# Patient Record
Sex: FEMALE | Race: WHITE | Employment: FULL TIME | URBAN - METROPOLITAN AREA
[De-identification: names, ages, dates, MRNs, and addresses within clinical notes are randomized per-mention and may not be internally consistent; named-entity substitution may affect disease eponyms.]

---

## 2017-03-13 DIAGNOSIS — I10 ESSENTIAL HYPERTENSION WITH GOAL BLOOD PRESSURE LESS THAN 140/90: ICD-10-CM

## 2017-03-13 RX ORDER — LISINOPRIL 10 MG/1
TABLET ORAL
Qty: 30 TABLET | Refills: 0 | Status: SHIPPED | OUTPATIENT
Start: 2017-03-13 | End: 2017-03-21 | Stop reason: SINTOL

## 2017-03-13 NOTE — TELEPHONE ENCOUNTER
lisinopril (PRINIVIL,ZESTRIL) 10 MG tablet      Last Written Prescription Date: 10-24-16  Last Fill Quantity: 90, # refills: 1  Last Office Visit with [facility], [facility] or [facility] prescribing provider: 11-29-16  Next 5 appointments (look out 90 days)     Mar 21, 2017  8:40 AM CDT   MyChart Long with [facility] Leong [facility]   [facility]    [address] [place] [id number] [phone]                   Potassium   Date Value Ref Range Status   11/07/2016 4.1 3.4 - 5.3 mmol/L Final     Creatinine   Date Value Ref Range Status   11/07/2016 1.11 (H) 0.52 - 1.04 mg/dL Final     BP Readings from Last 3 Encounters:   11/07/16 130/76   10/24/16 135/73   09/27/16 (!) 161/101

## 2017-03-13 NOTE — TELEPHONE ENCOUNTER
Medication is being filled for 1 time refill only due to:  upcomming appointment     Lorene Orozco RN   March 13, 2017 4:52 PM  [facility] Triage   [phone]

## 2017-03-21 ENCOUNTER — RADIANT APPOINTMENT (OUTPATIENT)
Dept: GENERAL RADIOLOGY | Facility: CLINIC | Age: 47
End: 2017-03-21
Attending: FAMILY MEDICINE
Payer: COMMERCIAL

## 2017-03-21 ENCOUNTER — OFFICE VISIT (OUTPATIENT)
Dept: FAMILY MEDICINE | Facility: CLINIC | Age: 47
End: 2017-03-21
Payer: COMMERCIAL

## 2017-03-21 VITALS
SYSTOLIC BLOOD PRESSURE: 126 MMHG | HEIGHT: 66 IN | DIASTOLIC BLOOD PRESSURE: 74 MMHG | BODY MASS INDEX: 25.55 KG/M2 | OXYGEN SATURATION: 100 % | TEMPERATURE: 98.4 F | HEART RATE: 82 BPM | WEIGHT: 159 LBS

## 2017-03-21 DIAGNOSIS — T50.Z95D VACCINE REACTION, SUBSEQUENT ENCOUNTER: ICD-10-CM

## 2017-03-21 DIAGNOSIS — R05.9 COUGH: Primary | ICD-10-CM

## 2017-03-21 DIAGNOSIS — G43.109 MIGRAINE WITH AURA AND WITHOUT STATUS MIGRAINOSUS, NOT INTRACTABLE: ICD-10-CM

## 2017-03-21 DIAGNOSIS — R05.9 COUGH: ICD-10-CM

## 2017-03-21 DIAGNOSIS — T75.3XXA MOTION SICKNESS, INITIAL ENCOUNTER: ICD-10-CM

## 2017-03-21 DIAGNOSIS — Z79.899 ENCOUNTER FOR LONG-TERM (CURRENT) USE OF MEDICATIONS: ICD-10-CM

## 2017-03-21 DIAGNOSIS — Z12.4 SCREENING FOR MALIGNANT NEOPLASM OF CERVIX: ICD-10-CM

## 2017-03-21 DIAGNOSIS — I10 HYPERTENSION GOAL BP (BLOOD PRESSURE) < 140/90: ICD-10-CM

## 2017-03-21 DIAGNOSIS — F40.01 FEAR OF TRAVEL WITH PANIC ATTACKS: ICD-10-CM

## 2017-03-21 LAB
ANION GAP SERPL CALCULATED.3IONS-SCNC: 6 MMOL/L (ref 3–14)
BUN SERPL-MCNC: 15 MG/DL (ref 7–30)
CALCIUM SERPL-MCNC: 8.7 MG/DL (ref 8.5–10.1)
CHLORIDE SERPL-SCNC: 107 MMOL/L (ref 94–109)
CO2 SERPL-SCNC: 25 MMOL/L (ref 20–32)
CREAT SERPL-MCNC: 0.89 MG/DL (ref 0.52–1.04)
GFR SERPL CREATININE-BSD FRML MDRD: 68 ML/MIN/1.7M2
GLUCOSE SERPL-MCNC: 94 MG/DL (ref 70–99)
POTASSIUM SERPL-SCNC: 4.9 MMOL/L (ref 3.4–5.3)
SODIUM SERPL-SCNC: 138 MMOL/L (ref 133–144)

## 2017-03-21 PROCEDURE — 71020 XR CHEST 2 VW: CPT

## 2017-03-21 PROCEDURE — 80048 BASIC METABOLIC PNL TOTAL CA: CPT | Performed by: FAMILY MEDICINE

## 2017-03-21 PROCEDURE — 99214 OFFICE O/P EST MOD 30 MIN: CPT | Performed by: FAMILY MEDICINE

## 2017-03-21 PROCEDURE — 36415 COLL VENOUS BLD VENIPUNCTURE: CPT | Performed by: FAMILY MEDICINE

## 2017-03-21 RX ORDER — BENZONATATE 100 MG/1
100 CAPSULE ORAL 3 TIMES DAILY PRN
Qty: 42 CAPSULE | Refills: 0 | Status: SHIPPED | OUTPATIENT
Start: 2017-03-21 | End: 2017-10-20

## 2017-03-21 RX ORDER — LORAZEPAM 0.5 MG/1
0.25-0.5 TABLET ORAL EVERY 8 HOURS PRN
Qty: 20 TABLET | Refills: 1 | Status: SHIPPED | OUTPATIENT
Start: 2017-03-21 | End: 2017-09-29

## 2017-03-21 RX ORDER — SCOLOPAMINE TRANSDERMAL SYSTEM 1 MG/1
PATCH, EXTENDED RELEASE TRANSDERMAL
Qty: 6 PATCH | Refills: 2 | Status: SHIPPED
Start: 2017-03-21 | End: 2017-09-29

## 2017-03-21 RX ORDER — LOSARTAN POTASSIUM 25 MG/1
25 TABLET ORAL DAILY
Qty: 90 TABLET | Refills: 1 | Status: SHIPPED | OUTPATIENT
Start: 2017-03-21 | End: 2017-09-13

## 2017-03-21 RX ORDER — RIZATRIPTAN BENZOATE 5 MG/1
5-10 TABLET ORAL
Qty: 18 TABLET | Refills: 3 | Status: SHIPPED | OUTPATIENT
Start: 2017-03-21 | End: 2017-10-20

## 2017-03-21 NOTE — MR AVS SNAPSHOT
After Visit Summary   3/21/2017    Margie Becker    MRN: [id number]           Patient Information     Date Of Birth          1970        Visit Information        Provider Department      3/21/2017 8:40 AM Senait Leong MD [facility]        Today's Diagnoses     Cough    -  1    Hypertension goal BP (blood pressure) < 140/90        Fear of travel with panic attacks        Migraine with aura and without status migrainosus, not intractable        Motion sickness, initial encounter        Screening for malignant neoplasm of cervix        Encounter for long-term (current) use of medications        Vaccine reaction, subsequent encounter          Care Instructions    Please stop your lisinopril and let's switch to losartan.  I would like to have your blood pressure rechecked in 2-3 weeks - either with a nurse visit or at our pharmacy.    I would anticipate that it could take 2-4 weeks for your cough to resolve if it is related to your lisinopril.        Follow-ups after your visit        Additional Services     ALLERGY/ASTHMA ADULT REFERRAL       Your provider has referred you to: [facility]: [facility] [phone]  [email or web address]    Please be aware that coverage of these services is subject to the terms and limitations of your health insurance plan.  Call member services at your health plan with any benefit or coverage questions.      Please bring the following with you to your appointment:    (1) Any X-Rays, CTs or MRIs which have been performed.  Contact the facility where they were done to arrange for  prior to your scheduled appointment.    (2) List of current medications  (3) This referral request   (4) Any documents/labs given to you for this referral                  Who to contact     If you have questions or need follow up information about today's clinic visit or your schedule please contact [facility] directly at [phone].  Normal or non-critical lab and imaging results will be communicated to you by MyChart, letter or phone within 4 business days after the clinic has received the results. If you do not hear from us within 7 days, please contact the clinic through PVPowerhart or phone. If you have a critical or abnormal lab result, we will notify you by phone as soon as possible.  Submit refill requests through Hundo or call your pharmacy and they will forward the refill request to us. Please allow 3 business days for your refill to be completed.          Additional Information About Your Visit        PVPowerharCopybar Information     Hundo gives you secure access to your electronic health record. If you see a primary care provider, you can also send messages to your care team and make appointments. If you have questions, please call your primary care clinic.  If you do not have a primary care provider, please call [phone] and they will assist you.        Care EveryWhere ID     This is your Care EveryWhere ID. This could be used by other organizations to access your [place] medical records  [id number]        Your Vitals Were     Pulse Temperature Height Pulse Oximetry BMI (Body Mass Index)       82 98.4  F (36.9  C) (Oral) 5' 6\" (1.676 m) 100% 25.66 kg/m2        Blood Pressure from Last 3 Encounters:   03/21/17 126/74   11/07/16 130/76   10/24/16 135/73    Weight from Last 3 Encounters:   03/21/17 159 lb (72.1 kg)   11/07/16 166 lb (75.3 kg)   10/24/16 164 lb (74.4 kg)              We Performed the Following     ALLERGY/ASTHMA ADULT REFERRAL     Basic metabolic panel          Today's Medication Changes          These changes are accurate as of: 3/21/17 10:10 AM.  If you have any questions, ask your nurse or doctor.               Start taking these medicines.        Dose/Directions    benzonatate 100 MG capsule   Commonly known as:  TESSALON   Used for:  Cough   Started by:  Salo " Senait Marcano MD        Dose:  100 mg   Take 1 capsule (100 mg) by mouth 3 times daily as needed for cough   Quantity:  42 capsule   Refills:  0       losartan 25 MG tablet   Commonly known as:  COZAAR   Used for:  Hypertension goal BP (blood pressure) < 140/90   Started by:  Senait Leong MD        Dose:  25 mg   Take 1 tablet (25 mg) by mouth daily   Quantity:  90 tablet   Refills:  1         Stop taking these medicines if you haven't already. Please contact your care team if you have questions.     lisinopril 10 MG tablet   Commonly known as:  PRINIVIL/ZESTRIL   Stopped by:  Senait Leong MD                Where to get your medicines      These medications were sent to [facility] [address]  [address] [facility] [address]     Phone:  [phone]     benzonatate 100 MG capsule    losartan 25 MG tablet    rizatriptan 5 MG tablet    scopolamine 72 hr patch         Some of these will need a paper prescription and others can be bought over the counter.  Ask your nurse if you have questions.     Bring a paper prescription for each of these medications     LORazepam 0.5 MG tablet                Primary Care Provider Office Phone # Fax #    RICHARD Baker [facility] [phone]       [facility] [address] [place] [facility] [address]        Thank you!     Thank you for choosing [facility]  for your care. Our goal is always to provide you with excellent care. Hearing back from our patients is one way we can continue to improve our services. Please take a few minutes to complete the written survey that you may receive in the mail after your visit with us. Thank you!             Your Updated Medication List - Protect others around you: Learn how to safely use, store and throw away your medicines at www.disposemymeds.org.          This list is accurate as of: 3/21/17 10:10 AM.   Always use your most recent med list.                   Brand Name Dispense Instructions for use    benzonatate 100 MG capsule    TESSALON    42 capsule    Take 1 capsule (100 mg) by mouth 3 times daily as needed for cough       CENTRUM Tabs      1 TABLET DAILY       cetirizine 10 MG tablet    zyrTEC     Take 10 mg by mouth daily.       FISH OIL PO      Take 1 tablet by mouth daily.       folic acid 800 MCG Tabs      Take  by mouth.       ibuprofen 400 MG tablet    ADVIL/MOTRIN    60 tablet    Take 1-2 tablets by mouth every 6 hours as needed (cramping).       IRON SUPPLEMENT PO      Take  by mouth.       LORazepam 0.5 MG tablet    ATIVAN    20 tablet    Take 0.5-1 tablets (0.25-0.5 mg) by mouth every 8 hours as needed for anxiety Take 30 minutes prior to departure.  Do not operate a vehicle after taking this medication       losartan 25 MG tablet    COZAAR    90 tablet    Take 1 tablet (25 mg) by mouth daily       rizatriptan 5 MG tablet    MAXALT    18 tablet    Take 1-2 tablets (5-10 mg) by mouth at onset of headache for migraine May repeat dose in 2 hours.  Do not exceed 30 mg in 24 hours       scopolamine 72 hr patch    TRANSDERM    6 patch    Place 1 patch onto the skin every 72 hours.  Apply to hairless area behind one ear at least 4 hours before travel.  Remove old patch and change every 3 days.       vitamin D 1000 UNITS capsule      Take 1 capsule by mouth daily.

## 2017-03-21 NOTE — NURSING NOTE
Written Rx of Lorazepam given to patient in clinic during check out process.    Anisa Manjarrez MA

## 2017-03-21 NOTE — PATIENT INSTRUCTIONS
Please stop your lisinopril and let's switch to losartan.  I would like to have your blood pressure rechecked in 2-3 weeks - either with a nurse visit or at our pharmacy.    I would anticipate that it could take 2-4 weeks for your cough to resolve if it is related to your lisinopril.    [facility]   Discharged by : Anisa GONSALES MA    If you have any questions regarding your visit please contact your care team:     [facility] Clinic Hours Telephone Number   Dr. Bety Sprague, BBAAR   7am-7pm Monday - Thursday   7am-5pm Fridays  [phone]   (Appointment scheduling available 24/7)   RN Line   [phone] option 2       For a Price Quote for your services, please call our Consumer Price Line at [phone].     What options do I have for visits at the clinic other than the traditional office visit?     To expand how we care for you, many of our providers are utilizing electronic visits (e-visits) and telephone visits, when medically appropriate, for interactions with their patients rather than a visit in the clinic. We also offer nurse visits for many medical concerns. Just like any other service, we will bill your insurance company for this type of visit based on time spent on the phone with your provider. Not all insurance companies cover these visits. Please check with your medical insurance if this type of visit is covered. You will be responsible for any charges that are not paid by your insurance.   E-visits via AwarenessHub: generally incur a $35.00 fee.     Telephone visits:   Time spent on the phone: *charged based on time that is spent on the phone in increments of 10 minutes. Estimated cost:   5-10 mins $30.00   11-20 mins. $59.00   21-30 mins. $85.00     Use RedHelpert (secure email communication and access to your chart) to send your primary care provider a message or make an appointment. Ask someone on your Team how to sign up for AwarenessHub.      As always, Thank you for trusting us with your health care needs!      [facility]:    Scheduling Appointments  [facility]  Call: [phone]    [facility]  Call: [phone]    [facility]  Call: [phone]      WHERE TO GO FOR CARE?    Clinic    Make an appointment if you:       Are sick (cold, cough, flu, sore throat, earache or in pain).       Have a small injury (sprain, small cut, burn or broken bone).       Need a physical exam, Pap smear, vaccine or prescription refill.       Have questions about your health or medicines.    To reach us:      Call [phone] ([phone]). Open 24 hours every day. (For counseling services, call [phone].)    Log into [employer] at [employer]. (Visit [email or web address] to create an account.) Hospital emergency room    An emergency is a serious or life- threatening problem that must be treated right away.    Call 196 or get to the hospital if you have:      Very bad or sudden:            - Chest pain or pressure         - Bleeding         - Head or belly pain         - Dizziness or trouble seeing, walking or                          Speaking      Problems breathing      Blood in your vomit or you are coughing up blood      A major injury (knocked out, loss of a finger or limb, rape, broken bone protruding from skin)    A mental health crisis. (Or call the Mental Health Crisis line at 1-383.262.4950 or Suicide Prevention Hotline at 1-170.914.7277.)    Open 24 hours every day. You don't need an appointment.     Urgent care    Visit urgent care for sickness or small injuries when the clinic is closed. You don't need an appointment. To check hours or find an urgent care near you, visit [email or web address]. Online care    Get online care from [facility] for more than 70 common problems, like colds, allergies and infections. Open 24 hours every day at:  [email or web address]   Need help deciding?    For advice about where to be seen, you may call your clinic and ask to speak with a nurse. We're here for you 24 hours every day.         If you are deaf or hard of hearing, please let us know. We provide many free services including sign language interpreters, oral interpreters, TTYs, telephone amplifiers, note takers and written materials.

## 2017-03-21 NOTE — PROGRESS NOTES
SUBJECTIVE:                                                    Margie Becker is a 46 year old female who presents to clinic today for the following health issues:    *Patient is requesting recheck of creatinine levels. Last checked 11/7/16. 1.11mg/dl.    Migraine Follow-Up    Headaches symptoms:  stable    Frequency: annually, more seasonal with the weather changes, start around May typically    Duration of headaches: has not been bothered, will start soon    Able to do normal daily activities/work with migraines: Yes    Rescue/Relief medication:Maxalt              Effectiveness: total relief    Preventative medication: None    Neurologic complications: No new stroke-like symptoms, loss of vision or speech, numbness or weakness    In the past 4 weeks, how often have you gone to Urgent Care or the emergency room because of your headaches?  0       Amount of exercise or physical activity: 4-5 days/week for an average of 30-45 minutes    Problems taking medications regularly: No    Medication side effects: none    Diet: regular (no restrictions), not a lot of soft cheese and dairy, has lactose free milk    Uses Maxalt for migraines, but has not had one since last Spring. Usually triggered by allergies to birch pollen in the spring.     ENT Symptoms             Symptoms: cc Present Absent Comment   Fever/Chills   x    Fatigue   x    Muscle Aches   x    Eye Irritation   x    Sneezing   x    Nasal Ventura/Drg   x    Sinus Pressure/Pain   x    Loss of smell   x    Dental pain   x    Sore Throat   x    Swollen Glands   x    Ear Pain/Fullness   x    Cough x   dry   Wheeze   x    Chest Pain   x    Shortness of breath   x    Rash   x    Other   x      Symptom duration:  4 months, on and off cold symptoms during the 4 months, currently no other cold symptoms but dry cough   Symptom severity:  moderate   Treatments tried:           Has had a dry cough since late Fall 2016. Initially she caught a cold and saw Dr. Preston  "10/24 who suspected her cough was from postnasal drip but also noted it could be a side effect from lisinopril. Patient has been on lisinopril since 2016. She has caught a couple other colds since then and the cough has not gone away. It did resolve completely for about a month during which she was taking Percocet after her abdominoplasty. When she stopped the Percocet her cough slowly returned and is still present now. She says \"it feels like there is a little hair\" in the right side of her throat, but she hasn't seen anything in her throat when looking. It gets worse throughout the day and keeps her awake at night. No wheezes. No chest x-ray has been done.    Has a dust allergy as well. Takes zyrtec for allergies.    Reviewed negative chest x-ray and advised to discontinue lisinopril and start losartan. Will prescribe Tessalon pearls for cough management as well while on her trip. Will recheck BP before she leaves .     Travel  Goes to [place] for work about three time per year and requests Ativan for long flights. Her next flight is in 3 weeks, -. A 20 tablet supply lasts her about a year. Also uses scolpomine patch and would like refill.    Tetanus  Due for tetanus booster but says last time she had a fever for a couple days afterward. She discussed this with Kaity who mentioned following up with an allergist but patient forgot to do this. Would like referral to check for reactivity prior to booster.     Problem list and histories reviewed & adjusted, as indicated.  Additional history: as documented    Patient Active Problem List   Diagnosis     Acute reaction to stress     FAMILY HX PSYCHIATRIC COND anxiety, obsession     Atopic rhinitis     Fear of travel with panic attacks     CARDIOVASCULAR SCREENING; LDL GOAL LESS THAN 160     Dysmenorrhea     Excessive or frequent menstruation     Uterine leiomyoma     Infertility female      delivery delivered     Vitamin D deficiency     Hypertension goal " BP (blood pressure) < 140/90     Migraine with aura and without status migrainosus, not intractable     Past Surgical History   Procedure Laterality Date     Surgical history of -        rhinoplasty     Myomectomy uterus  2010     Rhinoplasty       Myomectomy uterus       Dilation and curettage, operative hysteroscopy, combined  2011     Procedure:COMBINED DILATION AND CURETTAGE, OPERATIVE HYSTEROSCOPY; Removal Endometrial Polyp; Surgeon:LOLA FUENTES; Location:[facility] OR      section  2013     Procedure:  SECTION;   Section;  Surgeon: Lola Fuentes MD;  Location: [facility] L+D     Hernia repair       Inguinal       Social History   Substance Use Topics     Smoking status: Never Smoker     Smokeless tobacco: Never Used     Alcohol use No     Family History   Problem Relation Age of Onset     Arthritis Mother      Thyroid Disease Mother      hypothyroidism     Depression Father      Alcohol/Drug Father      Neurologic Disorder Father      seizures     DIABETES Maternal Grandmother      HEART DISEASE Maternal Grandfather      HEART DISEASE Paternal Grandmother      Genitourinary Problems Paternal Grandfather      Depression Sister      Depression Sister      Hypertension Mother      Lipids Father      Breast Cancer Mother          Current Outpatient Prescriptions   Medication Sig Dispense Refill     LORazepam (ATIVAN) 0.5 MG tablet Take 0.5-1 tablets (0.25-0.5 mg) by mouth every 8 hours as needed for anxiety Take 30 minutes prior to departure.  Do not operate a vehicle after taking this medication 20 tablet 1     rizatriptan (MAXALT) 5 MG tablet Take 1-2 tablets (5-10 mg) by mouth at onset of headache for migraine May repeat dose in 2 hours.  Do not exceed 30 mg in 24 hours 18 tablet 3     scopolamine (TRANSDERM) 72 hr patch Place 1 patch onto the skin every 72 hours.  Apply to hairless area behind one ear at least 4 hours before travel.  Remove old patch and change  "every 3 days. 6 patch 2     losartan (COZAAR) 25 MG tablet Take 1 tablet (25 mg) by mouth daily 90 tablet 1     benzonatate (TESSALON) 100 MG capsule Take 1 capsule (100 mg) by mouth 3 times daily as needed for cough 42 capsule 0     cetirizine (ZYRTEC) 10 MG tablet Take 10 mg by mouth daily.       ibuprofen (ADVIL,MOTRIN) 400 MG tablet Take 1-2 tablets by mouth every 6 hours as needed (cramping). 60 tablet 0     Ferrous Sulfate (IRON SUPPLEMENT PO) Take  by mouth.       Omega-3 Fatty Acids (FISH OIL PO) Take 1 tablet by mouth daily.       Cholecalciferol (VITAMIN D) 1000 UNIT capsule Take 1 capsule by mouth daily.       folic acid 800 MCG TABS Take  by mouth.       CENTRUM OR TABS 1 TABLET DAILY       Allergies   Allergen Reactions     Iodine Hives     xray dye     Penicillins Hives     Tetanus Toxoid Other (See Comments)     Fingers started to curl and get stiff. And did have a fever of  104.  Lasted for   1 1/2 days.  And the reaction started about 6 hours after the injection.       Hydrochlorothiazide Rash     Lexapro [Escitalopram Oxalate] Rash     Sulfa Drugs Rash       Reviewed and updated as needed this visit by clinical staff  Allergies  Meds  Problems       Reviewed and updated as needed this visit by Provider  Allergies  Meds  Problems         ROS:  Constitutional, HEENT, cardiovascular, pulmonary, gi and gu systems are negative, except as otherwise noted.    This document serves as a record of the services and decisions personally performed by Senait Leong MD. It was created on his/her behalf by Sandra Harley, a trained medical scribe. The creation of this document is based on the provider's statements to the medical scribe. Sandra Harley March 21, 2017 9:01 AM  OBJECTIVE:                                                    /74 (BP Location: Right arm, Patient Position: Chair, Cuff Size: Adult Large)  Pulse 82  Temp 98.4  F (36.9  C) (Oral)  Ht 5' 6\" (1.676 m)  Wt 159 lb (72.1 kg)  " SpO2 100%  BMI 25.66 kg/m2  Body mass index is 25.66 kg/(m^2).  GENERAL: healthy, alert and no distress  HENT: ear canals and TM's normal, nose and mouth without ulcers or lesions  RESP: lungs clear to auscultation - no rales, rhonchi or wheezes  CV: regular rate and rhythm, normal S1 S2, no S3 or S4, no murmur, click or rub, no peripheral edema and peripheral pulses strong    Diagnostic Test Results:  No results found for this or any previous visit (from the past 24 hour(s)).  CXR - negative     ASSESSMENT/PLAN:                                                    (R05) Cough  (primary encounter diagnosis)  Comment: CXR today was negative. Suspect this is a side effect of lisinopril, although could also be asthma  Plan: XR Chest 2 Views, benzonatate (TESSALON) 100 MG        capsule        Discontinue lisinopril and start losartan. Follow up in 2-4 weeks for nurse visit to recheck BP.   If cough not improved, would consider trial of advair.  If that doesn't help, and she continues to have throat symptoms would consider referral to ENT.    (I10) Hypertension goal BP (blood pressure) < 140/90  Comment: Controlled but experiencing cough as noted above.  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    (G43.109) Migraine with aura and without status migrainosus, not intractable  Comment: Triggered by allergies. Controlled with Maxalt. Has not had migraine since last spring.   Plan: rizatriptan (MAXALT) 5 MG tablet        Continue current medications.    (T75.3XXA) Motion sickness, initial encounter  Comment: related to flying and boats, buses  Plan: scopolamine (TRANSDERM) 72 hr patch        Refills provided    (Z12.4) Screening for malignant neoplasm of cervix  Comment: Due  Plan: Will schedule    (Z79.899) Encounter for long-term (current) use of  medications  Comment: Requests creatinine recheck (lat done 11/2016)  Plan: Basic metabolic panel        Adjust therapy based on labs    (T50.Z95D) Vaccine reaction, subsequent encounter  Comment: Had fever after last tetanus booster, forgot to f/u with allergist as previously suggested.  Plan: ALLERGY/ASTHMA ADULT REFERRAL        F/u with allergist      Patient Instructions     Please stop your lisinopril and let's switch to losartan.  I would like to have your blood pressure rechecked in 2-3 weeks - either with a nurse visit or at our pharmacy.    I would anticipate that it could take 2-4 weeks for your cough to resolve if it is related to your lisinopril.    [facility]   Discharged by : Anisa GONSALES MA    If you have any questions regarding your visit please contact your care team:     [facility] Clinic Hours Telephone Number   Dr. Bety Sprague, BABAR   7am-7pm Monday - Thursday   7am-5pm Fridays  [phone]   (Appointment scheduling available 24/7)   RN Line   [phone] option 2       For a Price Quote for your services, please call our Consumer Price Line at [phone].     What options do I have for visits at the clinic other than the traditional office visit?     To expand how we care for you, many of our providers are utilizing electronic visits (e-visits) and telephone visits, when medically appropriate, for interactions with their patients rather than a visit in the clinic. We also offer nurse visits for many medical concerns. Just like any other service, we will bill your insurance company for this type of visit based on time spent on the phone with your provider. Not all insurance companies cover these visits. Please check with your medical insurance if this type of visit is covered. You will be responsible for any charges that are not paid by your insurance.   E-visits via [employer]: generally incur a $35.00 fee.     Telephone  visits:   Time spent on the phone: *charged based on time that is spent on the phone in increments of 10 minutes. Estimated cost:   5-10 mins $30.00   11-20 mins. $59.00   21-30 mins. $85.00     Use [employer] (secure email communication and access to your chart) to send your primary care provider a message or make an appointment. Ask someone on your Team how to sign up for [employer].     As always, Thank you for trusting us with your health care needs!      [facility]:    Scheduling Appointments  [place], [facility]  Call: [phone]    [place], [facility]  Call: [phone]    [facility]  Call: [phone]      WHERE TO GO FOR CARE?    Clinic    Make an appointment if you:       Are sick (cold, cough, flu, sore throat, earache or in pain).       Have a small injury (sprain, small cut, burn or broken bone).       Need a physical exam, Pap smear, vaccine or prescription refill.       Have questions about your health or medicines.    To reach us:      Call [phone] ([phone]). Open 24 hours every day. (For counseling services, call [phone].)    Log into [employer] at [email or web address]. (Visit [email or web address] to create an account.) Hospital emergency room    An emergency is a serious or life- threatening problem that must be treated right away.    Call 852 or get to the hospital if you have:      Very bad or sudden:            - Chest pain or pressure         - Bleeding         - Head or belly pain         - Dizziness or trouble seeing, walking or                          Speaking      Problems breathing      Blood in your vomit or you are coughing up blood      A major injury (knocked out, loss of a finger or limb, rape, broken bone protruding from skin)    A mental health crisis. (Or call the Mental Health Crisis line at 1-894.485.5262 or Suicide Prevention Hotline at 1-753.564.4666.)    Open 24 hours every day. You  don't need an appointment.     Urgent care    Visit urgent care for sickness or small injuries when the clinic is closed. You don't need an appointment. To check hours or find an urgent care near you, visit [email or web address]. Online care    Get online care from [facility] for more than 70 common problems, like colds, allergies and infections. Open 24 hours every day at: [email or web address]   Need help deciding?    For advice about where to be seen, you may call your clinic and ask to speak with a nurse. We're here for you 24 hours every day.         If you are deaf or hard of hearing, please let us know. We provide many free services including sign language interpreters, oral interpreters, TTYs, telephone amplifiers, note takers and written materials.                     The information in this document, created by the medical scribe Sandra Harley for me, accurately reflects the services I personally performed and the decisions made by me. I have reviewed and approved this document for accuracy prior to leaving the patient care area.    Senait Leong MD  [facility]

## 2017-07-01 ENCOUNTER — HEALTH MAINTENANCE LETTER (OUTPATIENT)
Age: 47
End: 2017-07-01

## 2017-09-13 DIAGNOSIS — I10 HYPERTENSION GOAL BP (BLOOD PRESSURE) < 140/90: ICD-10-CM

## 2017-09-13 NOTE — TELEPHONE ENCOUNTER
losartan (COZAAR) 25 MG tablet   25 mg, DAILY 1 ordered  Edit     Summary: Take 1 tablet (25 mg) by mouth daily, Disp-90 tablet, R-1, E-Prescribe   Dose, Route, Frequency: 25 mg, Oral, DAILY  Start: 3/21/2017  Ord/Sold: 3/21/2017 (O)  Report  Taking:   Long-term:   Pharmacy: [address]  Med Dose History       Patient Sig: Take 1 tablet (25 mg) by mouth daily       Ordered on: 3/21/2017       Authorized by: NYDIA LEY       Dispense: 90 tablet          Last Office Visit with [facility], [facility] or [facility] prescribing provider: 3-       Potassium   Date Value Ref Range Status   03/21/2017 4.9 3.4 - 5.3 mmol/L Final     Creatinine   Date Value Ref Range Status   03/21/2017 0.89 0.52 - 1.04 mg/dL Final     BP Readings from Last 3 Encounters:   03/21/17 126/74   11/07/16 130/76   10/24/16 135/73

## 2017-09-14 RX ORDER — LOSARTAN POTASSIUM 25 MG/1
25 TABLET ORAL DAILY
Qty: 30 TABLET | Refills: 0 | Status: SHIPPED | OUTPATIENT
Start: 2017-09-14 | End: 2017-10-15

## 2017-09-29 ENCOUNTER — OFFICE VISIT (OUTPATIENT)
Dept: FAMILY MEDICINE | Facility: CLINIC | Age: 47
End: 2017-09-29
Payer: COMMERCIAL

## 2017-09-29 VITALS
TEMPERATURE: 98.2 F | SYSTOLIC BLOOD PRESSURE: 126 MMHG | DIASTOLIC BLOOD PRESSURE: 74 MMHG | WEIGHT: 166 LBS | HEIGHT: 66 IN | HEART RATE: 80 BPM | BODY MASS INDEX: 26.68 KG/M2

## 2017-09-29 DIAGNOSIS — T50.A15S: ICD-10-CM

## 2017-09-29 DIAGNOSIS — Z23 NEED FOR PROPHYLACTIC VACCINATION AND INOCULATION AGAINST INFLUENZA: ICD-10-CM

## 2017-09-29 DIAGNOSIS — T75.3XXA MOTION SICKNESS, INITIAL ENCOUNTER: ICD-10-CM

## 2017-09-29 DIAGNOSIS — I10 HYPERTENSION GOAL BP (BLOOD PRESSURE) < 140/90: Primary | ICD-10-CM

## 2017-09-29 DIAGNOSIS — F40.01 FEAR OF TRAVEL WITH PANIC ATTACKS: ICD-10-CM

## 2017-09-29 PROCEDURE — 90471 IMMUNIZATION ADMIN: CPT | Performed by: FAMILY MEDICINE

## 2017-09-29 PROCEDURE — 99213 OFFICE O/P EST LOW 20 MIN: CPT | Mod: 25 | Performed by: FAMILY MEDICINE

## 2017-09-29 PROCEDURE — 90686 IIV4 VACC NO PRSV 0.5 ML IM: CPT | Performed by: FAMILY MEDICINE

## 2017-09-29 RX ORDER — SCOLOPAMINE TRANSDERMAL SYSTEM 1 MG/1
PATCH, EXTENDED RELEASE TRANSDERMAL
Qty: 6 PATCH | Refills: 2 | Status: SHIPPED | OUTPATIENT
Start: 2017-09-29 | End: 2018-01-16

## 2017-09-29 RX ORDER — LORAZEPAM 0.5 MG/1
0.25-0.5 TABLET ORAL EVERY 8 HOURS PRN
Qty: 20 TABLET | Refills: 1 | Status: SHIPPED | OUTPATIENT
Start: 2017-09-29 | End: 2018-04-02

## 2017-09-29 RX ORDER — ONDANSETRON 8 MG/1
8 TABLET, ORALLY DISINTEGRATING ORAL
Qty: 40 TABLET | Refills: 1 | Status: SHIPPED | OUTPATIENT
Start: 2017-09-29 | End: 2017-10-20

## 2017-09-29 RX ORDER — HYDROXYZINE PAMOATE 25 MG/1
25 CAPSULE ORAL 3 TIMES DAILY PRN
Qty: 20 CAPSULE | Refills: 1 | Status: SHIPPED | OUTPATIENT
Start: 2017-09-29 | End: 2018-05-17

## 2017-09-29 NOTE — PROGRESS NOTES
SUBJECTIVE:   Margie Becker is a 47 year old female who presents to clinic today for the following health issues:      Hypertension Follow-up      Outpatient blood pressures are being checked at home.  Results are 130's over 70's.  It has been up to 140/80on stressful days at work     Low Salt Diet: low salt    cozzar 25 mg daily         Amount of exercise or physical activity: 4-5days/week for an average of greater than 60 minutes.    Problems taking medications regularly: No    Medication side effects: none  Diet: low salt    She has not had a tetnus shot since 2003.  She remembers having a high fever of 104.  This lasted about 48 hours.  She was told to go to the allergist but has not yet.      She has taken ativan and scopolamine patch with flying.  She has vomited in the past on the planes.  She does have to travel for work as far as [place].  She feels out of it with the patches on.  This affects her work as she has occasional meetings right after the flight.  She was on zofran in the past for post operative nausea.  She tolerated this well.  She gets sleepy on dramamine.  It didn't really help either.  She is traveling in bursts with flying twice a day for a few days.  Then she will stay in a spot a few days then fly again.      Problem list and histories reviewed & adjusted, as indicated.  Additional history: as documented    BP Readings from Last 3 Encounters:   09/29/17 126/74   03/21/17 126/74   11/07/16 130/76    Wt Readings from Last 3 Encounters:   09/29/17 166 lb (75.3 kg)   03/21/17 159 lb (72.1 kg)   11/07/16 166 lb (75.3 kg)                      Reviewed and updated as needed this visit by clinical staffTobacco  Allergies  Meds  Med Hx  Surg Hx  Fam Hx  Soc Hx      Reviewed and updated as needed this visit by Provider         ROS:  Constitutional, HEENT, cardiovascular, pulmonary, GI, , musculoskeletal, neuro, skin, endocrine and psych systems are negative, except as otherwise  "noted.      OBJECTIVE:   /74 (BP Location: Right arm, Cuff Size: Adult Regular)  Pulse 80  Temp 98.2  F (36.8  C) (Oral)  Ht 5' 6\" (1.676 m)  Wt 166 lb (75.3 kg)  BMI 26.79 kg/m2  Body mass index is 26.79 kg/(m^2).  GENERAL: healthy, alert and no distress  HENT: normal cephalic/atraumatic, oropharynx clear and oral mucous membranes moist  NECK: no adenopathy, no asymmetry, masses, or scars and thyroid normal to palpation  RESP: lungs clear to auscultation - no rales, rhonchi or wheezes  CV: regular rate and rhythm, normal S1 S2, no S3 or S4, no murmur, click or rub, no peripheral edema and peripheral pulses strong  MS: no gross musculoskeletal defects noted, no edema  PSYCH: mentation appears normal, affect normal/bright    Diagnostic Test Results:  Results for orders placed or performed in visit on 03/21/17   XR Chest 2 Views    Narrative    XR CHEST 2 VW  3/21/2017 9:30 AM    HISTORY:  Cough    COMPARISON:  None.      Impression    IMPRESSION:  Negative.     TREMAYNE ORANTES MD       ASSESSMENT/PLAN:     Hypertension; controlled   Associated with the following complications:    None   Plan:  No changes in the patient's current treatment plan        ASSESSMENT/PLAN:      ICD-10-CM    1. Hypertension goal BP (blood pressure) < 140/90 I10    2. Fear of travel with panic attacks F40.01 LORazepam (ATIVAN) 0.5 MG tablet     hydrOXYzine (VISTARIL) 25 MG capsule   3. Motion sickness, initial encounter T75.3XXA scopolamine (TRANSDERM) 72 hr patch     ondansetron (ZOFRAN ODT) 8 MG ODT tab    related to flying and boats, buses   4. Adverse reaction to diphtheria, tetanus, and pertussis vaccine, sequela T50.A15S ALLERGY/ASTHMA ADULT REFERRAL   5. Need for prophylactic vaccination and inoculation against influenza Z23 FLU VAC, SPLIT VIRUS IM > 3 YO (QUADRIVALENT) [69495]     Vaccine Administration, Initial [60126]     I would like her to get off the ativan and try vistaril instead but will prescribe both.  She knows " not to use together.      Patient Instructions   Try to use vistaril instead of ativan for travel anxiety     Use zofran for motion sickness instead of the patch     See the allergist     Follow up to see me for fasting labs, physical and pap in a few weeks     Your Blood pressure goal is <135/85    Cheryl Augustin D.O.              FUTURE APPOINTMENTS:       - Follow-up visit in 6 months for HTN    Cheryl Augustin, DO  [facility]  Injectable Influenza Immunization Documentation    1.  Is the person to be vaccinated sick today?   No    2. Does the person to be vaccinated have an allergy to a component   of the vaccine?   No    3. Has the person to be vaccinated ever had a serious reaction   to influenza vaccine in the past?   No    4. Has the person to be vaccinated ever had Guillain-Barré syndrome?   No    Form completed by Kaity Macias CMA ([facility])

## 2017-09-29 NOTE — PATIENT INSTRUCTIONS
Try to use vistaril instead of ativan for travel anxiety     Use zofran for motion sickness instead of the patch     See the allergist     Follow up to see me for fasting labs, physical and pap in a few weeks     Your Blood pressure goal is <135/85    Cheryl MONTEZO.

## 2017-09-29 NOTE — NURSING NOTE
One TRANSDERM and one ATIVAN script given to patient.    Kaity Macias CMA ([facility])

## 2017-09-29 NOTE — NURSING NOTE
"Chief Complaint   Patient presents with     Hypertension     Flu Shot     DONE      Gyn Exam     Pap only      Refill Request     Medications pended      Medication Request     Ondansetron - pt states that the pastches make her feel very out of it after the flight.        Initial /74 (BP Location: Right arm, Cuff Size: Adult Regular)  Pulse 80  Temp 98.2  F (36.8  C) (Oral)  Ht 5' 6\" (1.676 m)  Wt 166 lb (75.3 kg)  BMI 26.79 kg/m2 Estimated body mass index is 26.79 kg/(m^2) as calculated from the following:    Height as of this encounter: 5' 6\" (1.676 m).    Weight as of this encounter: 166 lb (75.3 kg).  Medication Reconciliation: complete     Kaity Macias CMA ([facility])      "

## 2017-09-29 NOTE — MR AVS SNAPSHOT
After Visit Summary   9/29/2017    Margie Becker    MRN: [id number]           Patient Information     Date Of Birth          1970        Visit Information        Provider Department      9/29/2017 2:00 PM Cheryl Augustin DO [facility]        Today's Diagnoses     Need for prophylactic vaccination and inoculation against influenza    -  1    Fear of travel with panic attacks        Motion sickness, initial encounter        Hypertension goal BP (blood pressure) < 140/90        Adverse reaction to diphtheria, tetanus, and pertussis vaccine, sequela          Care Instructions    Try to use vistaril instead of ativan for travel anxiety     Use zofran for motion sickness instead of the patch     See the allergist     Follow up to see me for fasting labs, physical and pap in a few weeks     Your Blood pressure goal is <135/85    Cheryl Augustin D.O.            Follow-ups after your visit        Additional Services     ALLERGY/ASTHMA ADULT REFERRAL       Your provider has referred you to: [facility]: [facility] Charles Abdi [phone]  [email or web address]    Please be aware that coverage of these services is subject to the terms and limitations of your health insurance plan.  Call member services at your health plan with any benefit or coverage questions.      Please bring the following with you to your appointment:    (1) Any X-Rays, CTs or MRIs which have been performed.  Contact the facility where they were done to arrange for  prior to your scheduled appointment.    (2) List of current medications  (3) This referral request   (4) Any documents/labs given to you for this referral                  Who to contact     If you have questions or need follow up information about today's clinic visit or your schedule please contact [facility] directly at [phone].  Normal or non-critical lab and imaging results will be communicated  "to you by Neozonehart, letter or phone within 4 business days after the clinic has received the results. If you do not hear from us within 7 days, please contact the clinic through GSOUND or phone. If you have a critical or abnormal lab result, we will notify you by phone as soon as possible.  Submit refill requests through GSOUND or call your pharmacy and they will forward the refill request to us. Please allow 3 business days for your refill to be completed.          Additional Information About Your Visit        GSOUND Information     GSOUND gives you secure access to your electronic health record. If you see a primary care provider, you can also send messages to your care team and make appointments. If you have questions, please call your primary care clinic.  If you do not have a primary care provider, please call [phone] and they will assist you.        Care EveryWhere ID     This is your Care EveryWhere ID. This could be used by other organizations to access your [place] medical records  [id number]        Your Vitals Were     Pulse Temperature Height BMI (Body Mass Index)          80 98.2  F (36.8  C) (Oral) 5' 6\" (1.676 m) 26.79 kg/m2         Blood Pressure from Last 3 Encounters:   09/29/17 126/74   03/21/17 126/74   11/07/16 130/76    Weight from Last 3 Encounters:   09/29/17 166 lb (75.3 kg)   03/21/17 159 lb (72.1 kg)   11/07/16 166 lb (75.3 kg)              We Performed the Following     ALLERGY/ASTHMA ADULT REFERRAL     FLU VAC, SPLIT VIRUS IM > 3 YO (QUADRIVALENT) [86061]     Vaccine Administration, Initial [06309]          Today's Medication Changes          These changes are accurate as of: 9/29/17  2:42 PM.  If you have any questions, ask your nurse or doctor.               Start taking these medicines.        Dose/Directions    hydrOXYzine 25 MG capsule   Commonly known as:  VISTARIL   Used for:  Fear of travel with panic attacks   Started by:  Cheryl Augustin DO        Dose:  25 mg   Take 1 " capsule (25 mg) by mouth 3 times daily as needed for anxiety   Quantity:  20 capsule   Refills:  1       ondansetron 8 MG ODT tab   Commonly known as:  ZOFRAN ODT   Used for:  Motion sickness, initial encounter   Started by:  Cheryl Augustin DO        Dose:  8 mg   Take 1 tablet (8 mg) by mouth 3 times daily (before meals)   Quantity:  40 tablet   Refills:  1            Where to get your medicines      These medications [place] [facility] [place] [address]  [address] [place] [address]     Phone:  [phone]     hydrOXYzine 25 MG capsule    ondansetron 8 MG ODT tab         Some of these will need a paper prescription and others can be bought over the counter.  Ask your nurse if you have questions.     Bring a paper prescription for each of these medications     LORazepam 0.5 MG tablet    scopolamine 72 hr patch                Primary Care Provider Office Phone # Fax #    Ria Cuevas, RICHARD [facility] [phone] [address] [place] [address]        Equal Access to Services     [facility]: Hadii aad ku hadasho Sofab, waaxda luqadaha, qaybta kaalmada adeegyacontreras, giulia sharma. So [facility] [phone].    ATENCIÓN: Si habla español, tiene a guzman disposición servicios gratuitos de asistencia lingüística. [facility] [phone].    We comply with applicable federal civil rights laws and [place] laws. We do not discriminate on the basis of race, color, national origin, age, disability, sex, sexual orientation, or gender identity.            Thank you!     Thank you for choosing [facility]  for your care. Our goal is always to provide you with excellent care. Hearing back from our patients is one way we can continue to improve our services. Please take a few minutes to complete the written survey that you may receive in the mail after your visit with us. Thank you!             Your  Updated Medication List - Protect others around you: Learn how to safely use, store and throw away your medicines at www.disposemymeds.org.          This list is accurate as of: 17  2:42 PM.  Always use your most recent med list.                   Brand Name Dispense Instructions for use Diagnosis    benzonatate 100 MG capsule    TESSALON    42 capsule    Take 1 capsule (100 mg) by mouth 3 times daily as needed for cough    Cough       CENTRUM Tabs      1 TABLET DAILY        cetirizine 10 MG tablet    zyrTEC     Take 10 mg by mouth daily.        folic acid 800 MCG Tabs      Take  by mouth.        hydrOXYzine 25 MG capsule    VISTARIL    20 capsule    Take 1 capsule (25 mg) by mouth 3 times daily as needed for anxiety    Fear of travel with panic attacks       ibuprofen 400 MG tablet    ADVIL/MOTRIN    60 tablet    Take 1-2 tablets by mouth every 6 hours as needed (cramping).     delivery delivered       IRON SUPPLEMENT PO      Take  by mouth.        LORazepam 0.5 MG tablet    ATIVAN    20 tablet    Take 0.5-1 tablets (0.25-0.5 mg) by mouth every 8 hours as needed for anxiety Take 30 minutes prior to departure.  Do not operate a vehicle after taking this medication    Fear of travel with panic attacks       losartan 25 MG tablet    COZAAR    30 tablet    Take 1 tablet (25 mg) by mouth daily Due to follow up with our clinic RN for blood pressure management please!    Hypertension goal BP (blood pressure) < 140/90       ondansetron 8 MG ODT tab    ZOFRAN ODT    40 tablet    Take 1 tablet (8 mg) by mouth 3 times daily (before meals)    Motion sickness, initial encounter       rizatriptan 5 MG tablet    MAXALT    18 tablet    Take 1-2 tablets (5-10 mg) by mouth at onset of headache for migraine May repeat dose in 2 hours.  Do not exceed 30 mg in 24 hours    Migraine with aura and without status migrainosus, not intractable       scopolamine 72 hr patch    TRANSDERM    6 patch    Place 1 patch onto the skin  every 72 hours.  Apply to hairless area behind one ear at least 4 hours before travel.  Remove old patch and change every 3 days.    Motion sickness, initial encounter       vitamin D 1000 UNITS capsule      Take 1 capsule by mouth daily.

## 2017-10-04 ENCOUNTER — MYC MEDICAL ADVICE (OUTPATIENT)
Dept: FAMILY MEDICINE | Facility: CLINIC | Age: 47
End: 2017-10-04

## 2017-10-04 NOTE — LETTER
[facility]  [address] [place] [id number] [phone]                                                                                                October 10, 2017    Margie Becker  [address] [address]        Dear Ms. Becker,    In order to ensure we are providing the best quality care, we have reviewed your chart and see that you are due for:    1 Physical with Pap    Please call the clinic at your earliest convenience to schedule an appointment.    Thank you for trusting us with your health care.    Sincerely,    Dr Nataly Carrillo/jose

## 2017-10-04 NOTE — TELEPHONE ENCOUNTER
Panel Management Review      Composite cancer screening  Chart review shows that this patient is due/due soon for the following Pap Smear  Summary:    Patient is due/failing the following:   PAP and PHYSICAL    Action needed:   Patient needs office visit for Physical with Pap.    Type of outreach:    Sent [facility] message.    Questions for provider review:    None                                                                                                                                    Anisa Manjarrez MA       Chart routed to Care Team .

## 2017-10-15 DIAGNOSIS — I10 HYPERTENSION GOAL BP (BLOOD PRESSURE) < 140/90: ICD-10-CM

## 2017-10-16 NOTE — TELEPHONE ENCOUNTER
losartan (COZAAR) 25 MG tablet   25 mg, DAILY 0 ordered  Edit     Summary: Take 1 tablet (25 mg) by mouth daily Due to follow up with our clinic RN for blood pressure management please!, Disp-30 tablet, R-0, E-Prescribe   Dose, Route, Frequency: 25 mg, Oral, DAILY  Start: 9/14/2017  Ord/Sold: 9/14/2017 (O)  Report  Taking:   Long-term:   Pharmacy[place] [facility] [place] - [address] [place] [address].  Med Dose History       Patient Sig: Take 1 tablet (25 mg) by mouth daily Due to follow up with our clinic RN for blood pressure management please!       Ordered on: 9/14/2017       Authorized by: NYDIA LEY       Dispense: 30 tablet       Admin Instructions: Due to follow up with our clinic RN for blood pressure management please!       Prior Authorization: Request PA          Last Office Visit with G, P or [facility] prescribing provider: 9-  Next 5 appointments (look out 90 days)     Oct 20, 2017 12:40 PM CDT   [facility] with Cheryl Augustin DO   [facility]    [address] [id number] [phone]                   Potassium   Date Value Ref Range Status   03/21/2017 4.9 3.4 - 5.3 mmol/L Final     Creatinine   Date Value Ref Range Status   03/21/2017 0.89 0.52 - 1.04 mg/dL Final     BP Readings from Last 3 Encounters:   09/29/17 126/74   03/21/17 126/74   11/07/16 130/76

## 2017-10-17 ENCOUNTER — MYC REFILL (OUTPATIENT)
Dept: FAMILY MEDICINE | Facility: CLINIC | Age: 47
End: 2017-10-17

## 2017-10-17 DIAGNOSIS — I10 HYPERTENSION GOAL BP (BLOOD PRESSURE) < 140/90: ICD-10-CM

## 2017-10-17 RX ORDER — LOSARTAN POTASSIUM 25 MG/1
TABLET ORAL
Qty: 90 TABLET | Refills: 1 | Status: CANCELLED | OUTPATIENT
Start: 2017-10-17

## 2017-10-17 RX ORDER — LOSARTAN POTASSIUM 25 MG/1
TABLET ORAL
Qty: 90 TABLET | Refills: 1 | Status: SHIPPED | OUTPATIENT
Start: 2017-10-17 | End: 2018-03-25

## 2017-10-17 NOTE — TELEPHONE ENCOUNTER
Message from MyChart:  Original authorizing provider: Senait Leong MD    Margie Limapaloma would like a refill of the following medications:  losartan (COZAAR) 25 MG tablet [Senait Leong MD]    Preferred pharmacy: [facility] [address]    Comment:

## 2017-10-20 ENCOUNTER — OFFICE VISIT (OUTPATIENT)
Dept: FAMILY MEDICINE | Facility: CLINIC | Age: 47
End: 2017-10-20
Payer: COMMERCIAL

## 2017-10-20 VITALS
WEIGHT: 165.6 LBS | HEIGHT: 66 IN | BODY MASS INDEX: 26.61 KG/M2 | HEART RATE: 76 BPM | TEMPERATURE: 98.1 F | DIASTOLIC BLOOD PRESSURE: 84 MMHG | SYSTOLIC BLOOD PRESSURE: 128 MMHG

## 2017-10-20 DIAGNOSIS — Z80.3 FAMILY HISTORY OF MALIGNANT NEOPLASM OF BREAST: ICD-10-CM

## 2017-10-20 DIAGNOSIS — G43.109 MIGRAINE WITH AURA AND WITHOUT STATUS MIGRAINOSUS, NOT INTRACTABLE: ICD-10-CM

## 2017-10-20 DIAGNOSIS — Z00.00 ROUTINE GENERAL MEDICAL EXAMINATION AT A HEALTH CARE FACILITY: Primary | ICD-10-CM

## 2017-10-20 DIAGNOSIS — I10 HYPERTENSION GOAL BP (BLOOD PRESSURE) < 140/90: ICD-10-CM

## 2017-10-20 DIAGNOSIS — Z12.4 SCREENING FOR MALIGNANT NEOPLASM OF CERVIX: ICD-10-CM

## 2017-10-20 DIAGNOSIS — E55.9 VITAMIN D DEFICIENCY: ICD-10-CM

## 2017-10-20 DIAGNOSIS — N63.0 LUMP OR MASS IN BREAST: ICD-10-CM

## 2017-10-20 PROCEDURE — 80048 BASIC METABOLIC PNL TOTAL CA: CPT | Performed by: FAMILY MEDICINE

## 2017-10-20 PROCEDURE — 82306 VITAMIN D 25 HYDROXY: CPT | Performed by: FAMILY MEDICINE

## 2017-10-20 PROCEDURE — 99396 PREV VISIT EST AGE 40-64: CPT | Performed by: FAMILY MEDICINE

## 2017-10-20 PROCEDURE — G0145 SCR C/V CYTO,THINLAYER,RESCR: HCPCS | Performed by: FAMILY MEDICINE

## 2017-10-20 PROCEDURE — 87624 HPV HI-RISK TYP POOLED RSLT: CPT | Performed by: FAMILY MEDICINE

## 2017-10-20 PROCEDURE — 80061 LIPID PANEL: CPT | Performed by: FAMILY MEDICINE

## 2017-10-20 PROCEDURE — 36415 COLL VENOUS BLD VENIPUNCTURE: CPT | Performed by: FAMILY MEDICINE

## 2017-10-20 RX ORDER — RIZATRIPTAN BENZOATE 5 MG/1
5-10 TABLET ORAL
Qty: 18 TABLET | Refills: 3 | Status: SHIPPED | OUTPATIENT
Start: 2017-10-20 | End: 2019-08-30

## 2017-10-20 NOTE — NURSING NOTE
"Chief Complaint   Patient presents with     Physical       Initial /84 (BP Location: Right arm)  Pulse 76  Temp 98.1  F (36.7  C) (Oral)  Ht 5' 5.98\" (1.676 m)  Wt 165 lb 9.6 oz (75.1 kg)  BMI 26.74 kg/m2 Estimated body mass index is 26.74 kg/(m^2) as calculated from the following:    Height as of this encounter: 5' 5.98\" (1.676 m).    Weight as of this encounter: 165 lb 9.6 oz (75.1 kg).  Medication Reconciliation: complete     Shayne Manjarrez MA      "

## 2017-10-20 NOTE — MR AVS SNAPSHOT
After Visit Summary   10/20/2017    Margie Becker    MRN: [id number]           Patient Information     Date Of Birth          1970        Visit Information        Provider Department      10/20/2017 12:40 PM Cheryl Augustin DO [facility]        Today's Diagnoses     Routine general medical examination at a health care facility    -  1    Screening for malignant neoplasm of cervix        Migraine with aura and without status migrainosus, not intractable        Hypertension goal BP (blood pressure) < 140/90        Lump or mass in breast        Family history of malignant neoplasm of breast        Vitamin D deficiency           Follow-ups after your visit        Your next 10 appointments already scheduled     Oct 23, 2017  1:00 PM CDT   [facility] Augustin Ness [facility]   [facility]    [address] [place] [id number] [phone]              Future tests that were ordered for you today     Open Future Orders        Priority Expected Expires Ordered    MA Diagnostic Digital Bilateral Routine  10/20/2018 10/20/2017            Who to contact     If you have questions or need follow up information about today's clinic visit or your schedule please contact [facility] directly at [phone].  Normal or non-critical lab and imaging results will be communicated to you by MyChart, letter or phone within 4 business days after the clinic has received the results. If you do not hear from us within 7 days, please contact the clinic through MyChart or phone. If you have a critical or abnormal lab result, we will notify you by phone as soon as possible.  Submit refill requests through [employer] or call your pharmacy and they will forward the refill request to us. Please allow 3 business days for your refill to be completed.          Additional Information About Your Visit        MyChart Information      "[facility] gives you secure access to your electronic health record. If you see a primary care provider, you can also send messages to your care team and make appointments. If you have questions, please call your primary care clinic.  If you do not have a primary care provider, please call [phone] and they will assist you.        Care EveryWhere ID     This is your Care EveryWhere ID. This could be used by other organizations to access your [place] medical records  [id number]        Your Vitals Were     Pulse Temperature Height Last Period BMI (Body Mass Index)       76 98.1  F (36.7  C) (Oral) 5' 5.98\" (1.676 m) 10/11/2017 26.74 kg/m2        Blood Pressure from Last 3 Encounters:   10/20/17 128/84   09/29/17 126/74   03/21/17 126/74    Weight from Last 3 Encounters:   10/20/17 165 lb 9.6 oz (75.1 kg)   09/29/17 166 lb (75.3 kg)   03/21/17 159 lb (72.1 kg)              We Performed the Following     Basic metabolic panel  (Ca, Cl, CO2, Creat, Gluc, K, Na, BUN)     Lipid panel reflex to direct LDL     Pap imaged thin layer screen with HPV - recommended age 30 - 65 years (select HPV order below)     Vitamin D Deficiency          Today's Medication Changes          These changes are accurate as of: 10/20/17  1:39 PM.  If you have any questions, ask your nurse or doctor.               Stop taking these medicines if you haven't already. Please contact your care team if you have questions.     ondansetron 8 MG ODT tab   Commonly known as:  ZOFRAN ODT   Stopped by:  Cheryl Augustin DO                Where to get your medicines      These medications were sent to [facility] [place] - [place], [place] - [address]  [address] [facility] [address]     Phone:  [phone]     rizatriptan 5 MG tablet                Primary Care Provider Office Phone # Fax #    Cheryl Augustin [phone]       [address] [facility] [address]        Equal Access to Services     FIIRO " GAAR : Hadii aad ku miguel Friend, waaxda luqadaha, qaybta kaalmada aderafat, giulia carlos barpaloma jacques lisa niranjan . So [facility] [phone].    ATENCIÓN: Si habla pablo, tiene a guzman disposición servicios gratuitos de asistencia lingüística. Llame al [phone].    We comply with applicable federal civil rights laws and [place] laws. We do not discriminate on the basis of race, color, national origin, age, disability, sex, sexual orientation, or gender identity.            Thank you!     Thank you for choosing [facility]  for your care. Our goal is always to provide you with excellent care. Hearing back from our patients is one way we can continue to improve our services. Please take a few minutes to complete the written survey that you may receive in the mail after your visit with us. Thank you!             Your Updated Medication List - Protect others around you: Learn how to safely use, store and throw away your medicines at www.disposemymeds.org.          This list is accurate as of: 10/20/17  1:39 PM.  Always use your most recent med list.                   Brand Name Dispense Instructions for use Diagnosis    CENTRUM Tabs      1 TABLET DAILY        cetirizine 10 MG tablet    zyrTEC     Take 10 mg by mouth daily.        folic acid 800 MCG Tabs      Take  by mouth.        hydrOXYzine 25 MG capsule    VISTARIL    20 capsule    Take 1 capsule (25 mg) by mouth 3 times daily as needed for anxiety    Fear of travel with panic attacks       ibuprofen 400 MG tablet    ADVIL/MOTRIN    60 tablet    Take 1-2 tablets by mouth every 6 hours as needed (cramping).     delivery delivered       IRON SUPPLEMENT PO      Take  by mouth.        LORazepam 0.5 MG tablet    ATIVAN    20 tablet    Take 0.5-1 tablets (0.25-0.5 mg) by mouth every 8 hours as needed for anxiety Take 30 minutes prior to departure.  Do not operate a vehicle after taking this medication    Fear of travel with panic  attacks       losartan 25 MG tablet    COZAAR    90 tablet    TAKE ONE TABLET BY MOUTH EVERY DAY (NEED TO BE SEEN IN CLINIC FOR FURTHER REFILLS)    Hypertension goal BP (blood pressure) < 140/90       rizatriptan 5 MG tablet    MAXALT    18 tablet    Take 1-2 tablets (5-10 mg) by mouth at onset of headache for migraine May repeat dose in 2 hours.  Do not exceed 30 mg in 24 hours    Migraine with aura and without status migrainosus, not intractable       scopolamine 72 hr patch    TRANSDERM    6 patch    Place 1 patch onto the skin every 72 hours.  Apply to hairless area behind one ear at least 4 hours before travel.  Remove old patch and change every 3 days.    Motion sickness, initial encounter       vitamin D 1000 UNITS capsule      Take 1 capsule by mouth daily.

## 2017-10-20 NOTE — PROGRESS NOTES
SUBJECTIVE:   CC: Margie Becker is an 47 year old woman who presents for preventive health visit.     Physical   Annual:     Getting at least 3 servings of Calcium per day::  Yes    Bi-annual eye exam::  Yes    Dental care twice a year::  Yes    Sleep apnea or symptoms of sleep apnea::  None    Diet::  Low salt    Frequency of exercise::  4-5 days/week    Duration of exercise::  30-45 minutes    Taking medications regularly::  Yes    Medication side effects::  None    Additional concerns today::  No          Hypertension Follow-up      Outpatient blood pressures are being checked at home.  Results are 130/70.    Low Salt Diet: no added salt    Migraine Follow-Up    Headaches symptoms:  Improved not in two years    Frequency: once a year in may     Duration of headaches: one day     Able to do normal daily activities/work with migraines: yes    Rescue/Relief medication:Maxalt              Effectiveness: moderate relief    Preventative medication: better with allergy medication     Neurologic complications: No new stroke-like symptoms, loss of vision or speech, numbness or weakness    In the past 4 weeks, how often have you gone to Urgent Care or the emergency room because of your headaches?  0    She has a pollen allergy to birch pollen so it is worse in may.  She has been adjusting her lifestyle to this.      She has motion sickness and uses a scopolamine patch.  The zofran didn't work.      She used vistaril for some anxiety of a short flight but needs the ativan for long flights.      Her menses are getting heavy again.  She has had fibroids removed twice now.  She has had it early a few times.  It came two weeks instead of four weeks.     She may be allergic to the tetnus shot and she is going to see the allergiest about this next week.      Today's PHQ-2 Score:   PHQ-2 ( 1999 Pfizer) 10/20/2017   Q1: Little interest or pleasure in doing things 0   Q2: Feeling down, depressed or hopeless 0   PHQ-2 Score  "0   Q1: Little interest or pleasure in doing things Not at all   Q2: Feeling down, depressed or hopeless Not at all   PHQ-2 Score 0         Social History   Substance Use Topics     Smoking status: Never Smoker     Smokeless tobacco: Never Used     Alcohol use No       Reviewed orders with patient.  Reviewed health maintenance and updated orders accordingly - Yes  BP Readings from Last 3 Encounters:   10/20/17 128/84   09/29/17 126/74   03/21/17 126/74    Wt Readings from Last 3 Encounters:   10/20/17 165 lb 9.6 oz (75.1 kg)   09/29/17 166 lb (75.3 kg)   03/21/17 159 lb (72.1 kg)                      Patient under age 50, mutual decision reflected in health maintenance.        Pertinent mammograms are reviewed under the imaging tab.  History of abnormal Pap smear:   NO - age 30-65 PAP every 5 years with negative HPV co-testing recommended  Last 3 Pap Results:   PAP (no units)   Date Value   01/03/2014 NIL   07/28/2011 NIL   03/18/2009 NIL       Reviewed and updated as needed this visit by clinical staffTobacco  Allergies  Med Hx  Surg Hx  Fam Hx  Soc Hx        Reviewed and updated as needed this visit by Provider            Review of Systems  C: NEGATIVE for fever, chills, change in weight  I: NEGATIVE for worrisome rashes, moles or lesions  E: NEGATIVE for vision changes or irritation  ENT: NEGATIVE for ear, mouth and throat problems  R: NEGATIVE for significant cough or SOB  B: NEGATIVE for masses, tenderness or discharge  CV: NEGATIVE for chest pain, palpitations or peripheral edema  GI: NEGATIVE for nausea, abdominal pain, heartburn, or change in bowel habits  : NEGATIVE for unusual urinary or vaginal symptoms. Periods are regular.  M: NEGATIVE for significant arthralgias or myalgia  N: NEGATIVE for weakness, dizziness or paresthesias  P: NEGATIVE for changes in mood or affect     OBJECTIVE:   /84 (BP Location: Right arm)  Pulse 76  Temp 98.1  F (36.7  C) (Oral)  Ht 5' 5.98\" (1.676 m)  Wt 165 lb " 9.6 oz (75.1 kg)  LMP 10/11/2017  BMI 26.74 kg/m2  Physical Exam  GENERAL: healthy, alert and no distress  EYES: Eyes grossly normal to inspection, PERRL and conjunctivae and sclerae normal  HENT: ear canals and TM's normal, nose and mouth without ulcers or lesions  NECK: no adenopathy, no asymmetry, masses, or scars and thyroid normal to palpation  RESP: lungs clear to auscultation - no rales, rhonchi or wheezes  BREAST: normal without masses, tenderness or nipple discharge and no palpable axillary masses or adenopathy  CV: regular rate and rhythm, normal S1 S2, no S3 or S4, no murmur, click or rub, no peripheral edema and peripheral pulses strong  ABDOMEN: soft, nontender, no hepatosplenomegaly, no masses and bowel sounds normal   (female): normal female external genitalia, normal urethral meatus, vaginal mucosa pink, moist, well rugated, and normal cervix/adnexa/uterus without masses or discharge  MS: no gross musculoskeletal defects noted, no edema  SKIN: no suspicious lesions or rashes  NEURO: Normal strength and tone, mentation intact and speech normal  PSYCH: mentation appears normal, affect normal/bright    ASSESSMENT/PLAN:   (Z00.00) Routine general medical examination at a health care facility  (primary encounter diagnosis)  Comment:   Plan: Lipid panel reflex to direct LDL            (Z12.4) Screening for malignant neoplasm of cervix  Comment:   Plan: Pap imaged thin layer screen with HPV -         recommended age 30 - 65 years (select HPV order        below)            (G43.109) Migraine with aura and without status migrainosus, not intractable  Comment: stable   Plan: rizatriptan (MAXALT) 5 MG tablet            (I10) Hypertension goal BP (blood pressure) < 140/90  Comment: stable   Plan: Basic metabolic panel  (Ca, Cl, CO2, Creat,         Gluc, K, Na, BUN)            (N63.0) Lump or mass in breast  Comment: she has a family history of breast cancer in her mother. Will need to get diagnositic  "mammogram asap  Plan: MA Diagnostic Digital Bilateral            (Z80.3) Family history of malignant neoplasm of breast  Comment:   Plan: MA Diagnostic Digital Bilateral            (E55.9) Vitamin D deficiency  Comment:   Plan: Vitamin D Deficiency              COUNSELING:  Reviewed preventive health counseling, as reflected in patient instructions       Regular exercise       Healthy diet/nutrition         reports that she has never smoked. She has never used smokeless tobacco.    Estimated body mass index is 26.74 kg/(m^2) as calculated from the following:    Height as of this encounter: 5' 5.98\" (1.676 m).    Weight as of this encounter: 165 lb 9.6 oz (75.1 kg).         Counseling Resources:  ATP IV Guidelines  Pooled Cohorts Equation Calculator  Breast Cancer Risk Calculator  FRAX Risk Assessment  ICSI Preventive Guidelines  Dietary Guidelines for Americans, 2010  USDA's MyPlate  ASA Prophylaxis  Lung CA Screening    Cheryl Augustin DO  [facility]  "

## 2017-10-21 LAB
ANION GAP SERPL CALCULATED.3IONS-SCNC: 11 MMOL/L (ref 3–14)
BUN SERPL-MCNC: 13 MG/DL (ref 7–30)
CALCIUM SERPL-MCNC: 8.8 MG/DL (ref 8.5–10.1)
CHLORIDE SERPL-SCNC: 105 MMOL/L (ref 94–109)
CHOLEST SERPL-MCNC: 232 MG/DL
CO2 SERPL-SCNC: 22 MMOL/L (ref 20–32)
CREAT SERPL-MCNC: 0.98 MG/DL (ref 0.52–1.04)
GFR SERPL CREATININE-BSD FRML MDRD: 61 ML/MIN/1.7M2
GLUCOSE SERPL-MCNC: 84 MG/DL (ref 70–99)
HDLC SERPL-MCNC: 56 MG/DL
LDLC SERPL CALC-MCNC: 131 MG/DL
NONHDLC SERPL-MCNC: 176 MG/DL
POTASSIUM SERPL-SCNC: 4.1 MMOL/L (ref 3.4–5.3)
SODIUM SERPL-SCNC: 138 MMOL/L (ref 133–144)
TRIGL SERPL-MCNC: 227 MG/DL

## 2017-10-23 ENCOUNTER — HOSPITAL ENCOUNTER (OUTPATIENT)
Dept: MAMMOGRAPHY | Facility: CLINIC | Age: 47
Discharge: HOME | End: 2017-10-23
Attending: FAMILY MEDICINE | Admitting: FAMILY MEDICINE
Payer: COMMERCIAL

## 2017-10-23 ENCOUNTER — HOSPITAL ENCOUNTER (OUTPATIENT)
Dept: MAMMOGRAPHY | Facility: CLINIC | Age: 47
End: 2017-10-23
Attending: FAMILY MEDICINE
Payer: COMMERCIAL

## 2017-10-23 ENCOUNTER — OFFICE VISIT (OUTPATIENT)
Dept: ALLERGY | Facility: CLINIC | Age: 47
End: 2017-10-23
Payer: COMMERCIAL

## 2017-10-23 VITALS
OXYGEN SATURATION: 98 % | WEIGHT: 166.8 LBS | HEART RATE: 73 BPM | DIASTOLIC BLOOD PRESSURE: 96 MMHG | SYSTOLIC BLOOD PRESSURE: 158 MMHG | BODY MASS INDEX: 26.94 KG/M2

## 2017-10-23 DIAGNOSIS — T50.Z95A ADVERSE EFFECT OF VACCINE, INITIAL ENCOUNTER: Primary | ICD-10-CM

## 2017-10-23 DIAGNOSIS — R21 RASH: ICD-10-CM

## 2017-10-23 DIAGNOSIS — N63.0 LUMP OR MASS IN BREAST: ICD-10-CM

## 2017-10-23 DIAGNOSIS — Z80.3 FAMILY HISTORY OF MALIGNANT NEOPLASM OF BREAST: ICD-10-CM

## 2017-10-23 LAB — DEPRECATED CALCIDIOL+CALCIFEROL SERPL-MC: 29 UG/L (ref 20–75)

## 2017-10-23 PROCEDURE — G0204 DX MAMMO INCL CAD BI: HCPCS

## 2017-10-23 PROCEDURE — 99243 OFF/OP CNSLTJ NEW/EST LOW 30: CPT | Performed by: ALLERGY & IMMUNOLOGY

## 2017-10-23 PROCEDURE — 76642 ULTRASOUND BREAST LIMITED: CPT | Mod: LT

## 2017-10-23 NOTE — NURSING NOTE
"Chief Complaint   Patient presents with     Consult     allergic reaction to the tetanus booster. red wine, olives, and maraschino cherry give pt little tiny red dots on trunk.       Initial BP (!) 158/96  Pulse 73  Wt 75.7 kg (166 lb 12.8 oz)  LMP 10/11/2017  SpO2 98%  BMI 26.94 kg/m2 Estimated body mass index is 26.94 kg/(m^2) as calculated from the following:    Height as of 10/20/17: 1.676 m (5' 5.98\").    Weight as of this encounter: 75.7 kg (166 lb 12.8 oz).  Medication Reconciliation: complete   Luna Angel MA.... 1:10 PM....10/23/2017      "

## 2017-10-23 NOTE — MR AVS SNAPSHOT
After Visit Summary   10/23/2017    Margie Becker    MRN: [id number]           Patient Information     Date Of Birth          1970        Visit Information        Provider Department      10/23/2017 1:00 PM Augustin Ness MD [facility]        Today's Diagnoses     Adverse effect of vaccine, initial encounter    -  1    Rash          Care Instructions    If you have any questions regarding your allergies, asthma, or what we discussed during your visit today please call the allergy clinic or contact us via [email or web address].    [facility]: [phone]                Follow-ups after your visit        Who to contact     If you have questions or need follow up information about today's clinic visit or your schedule please contact [facility] directly at [phone].  Normal or non-critical lab and imaging results will be communicated to you by MyChart, letter or phone within 4 business days after the clinic has received the results. If you do not hear from us within 7 days, please contact the clinic through [employer] or phone. If you have a critical or abnormal lab result, we will notify you by phone as soon as possible.  Submit refill requests through [email or web address] or call your pharmacy and they will forward the refill request to us. Please allow 3 business days for your refill to be completed.          Additional Information About Your Visit        MyChart Information     [email or web address] gives you secure access to your electronic health record. If you see a primary care provider, you can also send messages to your care team and make appointments. If you have questions, please call your primary care clinic.  If you do not have a primary care provider, please call [phone] and they will assist you.        Care EveryWhere ID     This is your Care EveryWhere ID. This could be used by other organizations to access your [place] [facility] medical records  [id number]        Your Vitals  Were     Pulse Last Period Pulse Oximetry BMI (Body Mass Index)          73 10/11/2017 98% 26.94 kg/m2         Blood Pressure from Last 3 Encounters:   10/23/17 (!) 158/96   10/20/17 128/84   09/29/17 126/74    Weight from Last 3 Encounters:   10/23/17 75.7 kg (166 lb 12.8 oz)   10/20/17 75.1 kg (165 lb 9.6 oz)   09/29/17 75.3 kg (166 lb)              Today, you had the following     No orders found for display       Primary Care Provider Office Phone # Fax #    Cheryl Augustin [phone]       [address] [facility] [address]        Equal Access to Services     YI ROSA : Shazia Friend, waaxda luqadaha, qaybta kaalmada adeegyacontreras, giulia sharma. So [facility] [phone].    ATENCIÓN: Si habla español, tiene a guzman disposición servicios gratuitos de asistencia lingüística. Llame al [phone].    We comply with applicable federal civil rights laws and [place] laws. We do not discriminate on the basis of race, color, national origin, age, disability, sex, sexual orientation, or gender identity.            Thank you!     Thank you for choosing [facility]  for your care. Our goal is always to provide you with excellent care. Hearing back from our patients is one way we can continue to improve our services. Please take a few minutes to complete the written survey that you may receive in the mail after your visit with us. Thank you!             Your Updated Medication List - Protect others around you: Learn how to safely use, store and throw away your medicines at www.disposemymeds.org.          This list is accurate as of: 10/23/17 11:59 PM.  Always use your most recent med list.                   Brand Name Dispense Instructions for use Diagnosis    CENTRUM Tabs      1 TABLET DAILY        cetirizine 10 MG tablet    zyrTEC     Take 10 mg by mouth daily.        folic acid 800 MCG Tabs      Take  by mouth.        hydrOXYzine 25 MG capsule     VISTARIL    20 capsule    Take 1 capsule (25 mg) by mouth 3 times daily as needed for anxiety    Fear of travel with panic attacks       ibuprofen 400 MG tablet    ADVIL/MOTRIN    60 tablet    Take 1-2 tablets by mouth every 6 hours as needed (cramping).     delivery delivered       IRON SUPPLEMENT PO      Take  by mouth.        LORazepam 0.5 MG tablet    ATIVAN    20 tablet    Take 0.5-1 tablets (0.25-0.5 mg) by mouth every 8 hours as needed for anxiety Take 30 minutes prior to departure.  Do not operate a vehicle after taking this medication    Fear of travel with panic attacks       losartan 25 MG tablet    COZAAR    90 tablet    TAKE ONE TABLET BY MOUTH EVERY DAY (NEED TO BE SEEN IN CLINIC FOR FURTHER REFILLS)    Hypertension goal BP (blood pressure) < 140/90       rizatriptan 5 MG tablet    MAXALT    18 tablet    Take 1-2 tablets (5-10 mg) by mouth at onset of headache for migraine May repeat dose in 2 hours.  Do not exceed 30 mg in 24 hours    Migraine with aura and without status migrainosus, not intractable       scopolamine 72 hr patch    TRANSDERM    6 patch    Place 1 patch onto the skin every 72 hours.  Apply to hairless area behind one ear at least 4 hours before travel.  Remove old patch and change every 3 days.    Motion sickness, initial encounter       vitamin D 1000 UNITS capsule      Take 1 capsule by mouth daily.

## 2017-10-23 NOTE — PROGRESS NOTES
"Dear Cheryl Augustin, ,    Thank you for referring your patient Margie Becker to the Allergy/Immunology Clinic. Margie Becekr was seen in the Allergy Clinic at [facility]. The following are my recommendations regarding her Rash and Adverse Reaction to Vaccine    1. No contraindication to receiving future tetanus vaccines  2. Continue to avoid non-organic wine and olives  3. Follow-up in the allergy clinic as needed      Margie Becker is a 47 year old White female being seen today in consultation for possible allergy to tetanus vaccine. She states that in 2003 she received the tetanus vaccine and initially felt fine and returned home. Over the next 3 to 4 hours Margie reports that she felt feverish and checked her temp and it was noted to be about 104 degrees. She spent the day just lying in bed. Margie denies other symptoms including hives or rash, difficulty swallowing, swelling, cough, shortness of breath, nausea, vomiting, or diarrhea. She thinks she may have been lightheaded due to the fever and recalls that her hands \"felt kind of odd.\" She had no joint pain or swelling at the time. Margie reports that her fever lasted about 48 hours and then she was back to her usual self. She does not recall having any other illness at the time. Margie has received other vaccines and tolerated them without adverse reactions. She called the clinic as these symptoms were developing and was advised to take ibuprofen. Margie is concerned that she is allergic to the tetanus vaccine and has delayed receiving her booster due to concerns about this reaction.    Margie also feels that she may have an allergy or sensitivity to sulfites. After drinking wine or eating olives she has developed a rash on her arms and stomach but this does not occur every time she is exposed to these foods. In the past she also had a similar reaction which she felt may have been due to maraschino cherries. When Margie drinks " organic wine or eats organic olives she does not experience similar symptoms. She describes the rash as consisting of little red dots, about the size of pimples or smaller, that are slightly raised. It typically starts on her abdomen and can spread to her chest and arms. Margie states this rash occurs within 30-60 minutes of drinking non-organic wine or eating non-organic olives and resolves by the following day. She does not have associated lip/tongue/throat swelling, change in her voice, difficulty swallowing, cough, shortness of breath, nausea, vomiting, dizziness, or lightheadedness.      Past Medical History:   Diagnosis Date     Allergic rhinitis, cause unspecified      Anemia      Depressive disorder, not elsewhere classified      Esophageal reflux      Fear of travel with panic attacks 2010     Hx of previous reproductive problem     ivf pregnancy     Migraines      Personal history of urinary calculi      PONV (postoperative nausea and vomiting)      Family History   Problem Relation Age of Onset     Arthritis Mother      Thyroid Disease Mother      hypothyroidism     Hypertension Mother      Breast Cancer Mother      Depression Father      Alcohol/Drug Father      Neurologic Disorder Father      seizures     Lipids Father      DIABETES Maternal Grandmother      HEART DISEASE Maternal Grandfather      HEART DISEASE Paternal Grandmother      Genitourinary Problems Paternal Grandfather      Depression Sister      Depression Sister      Past Surgical History:   Procedure Laterality Date      SECTION  2013    Procedure:  SECTION;   Section;  Surgeon: Lola Fuentes MD;  Location: [facility] L+D     DILATION AND CURETTAGE, OPERATIVE HYSTEROSCOPY, COMBINED  2011    Procedure:COMBINED DILATION AND CURETTAGE, OPERATIVE HYSTEROSCOPY; Removal Endometrial Polyp; Surgeon:LOLA FUENTES; Location:[facility] OR     HERNIA REPAIR  1975    Inguinal     MYOMECTOMY UTERUS  2010      MYOMECTOMY UTERUS       RHINOPLASTY       SURGICAL HISTORY OF -       rhinoplasty       ENVIRONMENTAL HISTORY: [place] The home is heated with a gas furnace. They does not have central air conditioning. The patient's bedroom is furnished with hard cynthia in bedroom, allergen mattress cover, allergen pillowcase cover and fabric window coverings.  Pets inside the house include 2 cat(s). There is not history of cockroach or mice infestation. There is/are 0 smokers in the house.  The house does not have a damp basement.     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 She has missed 0 days of school/work . She lives with her spouse and 2 daughters.      REVIEW OF SYSTEMS:  General: negative for weight gain. negative for weight loss. negative for changes in sleep.   Eyes: negative for itching. negative for redness. negative for tearing/watering.  Ears: negative for fullness. negative for hearing loss. negative for dizziness.   Nose: negative for snoring.negative for changes in smell. negative for drainage.   Throat: negative for hoarseness. negative for sore throat. negative for trouble swallowing.   Lungs: negative for shortness of breath.negative for wheezing. negative for sputum production.   Cardiovascular: negative for chest pain. negative for swelling of ankles. negative for fast or irregular heartbeat.   Gastrointestinal: negative for nausea. negative for heartburn. negative for acid reflux.   Musculoskeletal: negative for joint pain. negative for joint stiffness. negative for joint swelling.   Neurologic: negative for seizures. negative for fainting. negative for weakness.   Psychiatric: negative for changes in mood. negative for anxiety.   Endocrine: negative for cold intolerance. negative for heat intolerance. negative for tremors.   Hematologic: negative for easy bruising. negative for easy bleeding.  Integumentary: positive  for rash. negative for scaling.  negative for nail changes.       Current Outpatient Prescriptions:      rizatriptan (MAXALT) 5 MG tablet, Take 1-2 tablets (5-10 mg) by mouth at onset of headache for migraine May repeat dose in 2 hours.  Do not exceed 30 mg in 24 hours, Disp: 18 tablet, Rfl: 3     losartan (COZAAR) 25 MG tablet, TAKE ONE TABLET BY MOUTH EVERY DAY (NEED TO BE SEEN IN CLINIC FOR FURTHER REFILLS), Disp: 90 tablet, Rfl: 1     LORazepam (ATIVAN) 0.5 MG tablet, Take 0.5-1 tablets (0.25-0.5 mg) by mouth every 8 hours as needed for anxiety Take 30 minutes prior to departure.  Do not operate a vehicle after taking this medication, Disp: 20 tablet, Rfl: 1     scopolamine (TRANSDERM) 72 hr patch, Place 1 patch onto the skin every 72 hours.  Apply to hairless area behind one ear at least 4 hours before travel.  Remove old patch and change every 3 days., Disp: 6 patch, Rfl: 2     hydrOXYzine (VISTARIL) 25 MG capsule, Take 1 capsule (25 mg) by mouth 3 times daily as needed for anxiety, Disp: 20 capsule, Rfl: 1     ibuprofen (ADVIL,MOTRIN) 400 MG tablet, Take 1-2 tablets by mouth every 6 hours as needed (cramping)., Disp: 60 tablet, Rfl: 0     Ferrous Sulfate (IRON SUPPLEMENT PO), Take  by mouth., Disp: , Rfl:      Cholecalciferol (VITAMIN D) 1000 UNIT capsule, Take 1 capsule by mouth daily., Disp: , Rfl:      folic acid 800 MCG TABS, Take  by mouth., Disp: , Rfl:      CENTRUM OR TABS, 1 TABLET DAILY, Disp: , Rfl:      cetirizine (ZYRTEC) 10 MG tablet, Take 10 mg by mouth daily., Disp: , Rfl:   Immunization History   Administered Date(s) Administered     Influenza (IIV3) 11/13/2012     Influenza Vaccine IM 3yrs+ 4 Valent IIV4 10/26/2015, 09/29/2017     TD (ADULT, 7+) 04/22/2003     Allergies   Allergen Reactions     Iodine Hives     xray dye     Penicillins Hives     Tetanus Toxoid Other (See Comments)     Fingers started to curl and get stiff. And did have a fever of  104.  Lasted for   1 1/2 days.  And the reaction started about 6 hours after  the injection.       Hydrochlorothiazide Rash     Lexapro [Escitalopram Oxalate] Rash     Sulfa Drugs Rash         EXAM:   BP (!) 158/96  Pulse 73  Wt 75.7 kg (166 lb 12.8 oz)  LMP 10/11/2017  SpO2 98%  BMI 26.94 kg/m2  GENERAL APPEARANCE: alert, cooperative and not in distress  SKIN: no rashes, no lesions  HEAD: atraumatic, normocephalic  EYES: lids and lashes normal, conjunctivae and sclerae clear, pupils equal, round, reactive to light, EOM full and intact  ENT: no scars or lesions, nasal exam showed no discharge, swelling or lesions noted, otoscopy showed external auditory canals clear, tympanic membranes normal, tongue midline and normal, soft palate, uvula, and tonsils normal  NECK: no asymmetry, masses, or scars, supple without significant adenopathy  LUNGS: unlabored respirations, no intercostal retractions or accessory muscle use, clear to auscultation without rales or wheezes  HEART: regular rate and rhythm without murmurs and normal S1 and S2  MUSCULOSKELETAL: no musculoskeletal defects are noted  NEURO: no focal deficits noted  PSYCH: does not appear depressed or anxious    WORKUP: None    ASSESSMENT/PLAN:  Margie Becker is a 47 year old female here for evaluation of previous reaction to vaccines and possible food allergies. After tetanus vaccine several years ago Margie developed a fever and fatigue which resolved within 48 hours with no other associated symptoms. She was counseled that these symptoms are not consistent with an allergic reaction and these previous symptoms would not be a contraindication for her to receive tetanus vaccine in the future. Margie was also counseled regarding the signs and symptoms of an IgE mediated food allergy vs an intolerance or sensitivity. Her symptoms do not occur with all exposure to wine or olives and have only developed when consuming non-organic varieties. This is not consistent with an IgE mediated food allergy and may be due to some sort of  intolerance or sensitivity to preservatives or components in these foods. She was advised to avoid non-organic olives and wine due to the discomfort associated with the rash but no further testing or evaluation is indicated at this time.    1. No contraindication to receiving future tetanus vaccines  2. Continue to avoid non-organic wine and olives  3. Follow-up in the allergy clinic as needed      Augustin Ness MD  Allergy/Immunology  [place], [place]      Chart documentation done in part with Dragon Voice Recognition Software. Although reviewed after completion, some word and grammatical errors may remain.

## 2017-10-23 NOTE — LETTER
"    10/23/2017         RE: Margie Becker  [address] [address]        Dear Colleague,    Thank you for referring your patient, Margie Becker, to the [facility]. Please see a copy of my visit note below.    Dear Cheryl Augustin DO,    Thank you for referring your patient Margie Becker to the Allergy/Immunology Clinic. Margie Becker was seen in the Allergy Clinic at [facility]. The following are my recommendations regarding her {Allergydiagnoses:466967}    1. No contraindication to receiving future tetanus vaccines  2. Continue to avoid non-organic wine and olives  3. Follow-up in the allergy clinic as needed      Margie Becker is a 47 year old White female being seen today in consultation for possible allergy to tetanus vaccine. She states that in 2003 she received the tetanus vaccine and initially felt fine and returned home. Over the next 3 to 4 hours Margie reports that she felt feverish and checked her temp and it was noted to be about 104 degrees. She spent the day just lying in bed. Margie denies other symptoms including hives or rash, difficulty swallowing, swelling, cough, shortness of breath, nausea, vomiting, or diarrhea. She thinks she may have been lightheaded due to the fever and recalls that her hands \"felt kind of odd.\" She had no joint pain or swelling at the time. Margie reports that her fever lasted about 48 hours and then she was back to her usual self. She does not recall having any other illness at the time. Margie has received other vaccines and tolerated them without adverse reactions. She called the clinic as these symptoms were developing and was advised to take ibuprofen. Margie is concerned that she is allergic to the tetanus vaccine and has delayed receiving her booster due to concerns about this reaction.    Margie also feels that she may have an allergy or sensitivity to sulfites. After drinking wine or " eating olives she has developed a rash on her arms and stomach but this does not occur every time she is exposed to these foods. In the past she also had a similar reaction which she felt may have been due to maraschino cherries. When Margie drinks organic wine or eats organic olives she does not experience similar symptoms. She describes the rash as consisting of little red dots, about the size of pimples or smaller, that are slightly raised. It typically starts on her abdomen and can spread to her chest and arms. Margie states this rash occurs within 30-60 minutes of drinking non-organic wine or eating non-organic olives and resolves by the following day. She does not have associated lip/tongue/throat swelling, change in her voice, difficulty swallowing, cough, shortness of breath, nausea, vomiting, dizziness, or lightheadedness.      Past Medical History:   Diagnosis Date     Allergic rhinitis, cause unspecified      Anemia      Depressive disorder, not elsewhere classified      Esophageal reflux      Fear of travel with panic attacks 2010     Hx of previous reproductive problem     ivf pregnancy     Migraines      Personal history of urinary calculi      PONV (postoperative nausea and vomiting)      Family History   Problem Relation Age of Onset     Arthritis Mother      Thyroid Disease Mother      hypothyroidism     Hypertension Mother      Breast Cancer Mother      Depression Father      Alcohol/Drug Father      Neurologic Disorder Father      seizures     Lipids Father      DIABETES Maternal Grandmother      HEART DISEASE Maternal Grandfather      HEART DISEASE Paternal Grandmother      Genitourinary Problems Paternal Grandfather      Depression Sister      Depression Sister      Past Surgical History:   Procedure Laterality Date      SECTION  2013    Procedure:  SECTION;   Section;  Surgeon: Lola Préez MD;  Location: [facility] L[facility]     DILATION AND CURETTAGE, OPERATIVE  HYSTEROSCOPY, COMBINED  4/27/2011    Procedure:COMBINED DILATION AND CURETTAGE, OPERATIVE HYSTEROSCOPY; Removal Endometrial Polyp; Surgeon:ARIS FUENTES; Location:[facility] OR     HERNIA REPAIR  1975    Inguinal     MYOMECTOMY UTERUS  7/2010     MYOMECTOMY UTERUS       RHINOPLASTY       SURGICAL HISTORY OF -       rhinoplasty       ENVIRONMENTAL HISTORY: [place]. The home is heated with a gas furnace. They does not have central air conditioning. The patient's bedroom is furnished with hard cynthia in bedroom, allergen mattress cover, allergen pillowcase cover and fabric window coverings.  Pets inside the house include 2 cat(s). There is not history of cockroach or mice infestation. There is/are 0 smokers in the house.  The house does not have a damp basement.     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 She has missed 0 days of school/work . She lives with her spouse and 2 daughters.      REVIEW OF SYSTEMS:  General: negative for weight gain. negative for weight loss. negative for changes in sleep.   Eyes: negative for itching. negative for redness. negative for tearing/watering.  Ears: negative for fullness. negative for hearing loss. negative for dizziness.   Nose: negative for snoring.negative for changes in smell. negative for drainage.   Throat: negative for hoarseness. negative for sore throat. negative for trouble swallowing.   Lungs: negative for shortness of breath.negative for wheezing. negative for sputum production.   Cardiovascular: negative for chest pain. negative for swelling of ankles. negative for fast or irregular heartbeat.   Gastrointestinal: negative for nausea. negative for heartburn. negative for acid reflux.   Musculoskeletal: negative for joint pain. negative for joint stiffness. negative for joint swelling.   Neurologic: negative for seizures. negative for fainting. negative for weakness.   Psychiatric: negative for changes in mood.  negative for anxiety.   Endocrine: negative for cold intolerance. negative for heat intolerance. negative for tremors.   Hematologic: negative for easy bruising. negative for easy bleeding.  Integumentary: positive  for rash. negative for scaling. negative for nail changes.       Current Outpatient Prescriptions:      rizatriptan (MAXALT) 5 MG tablet, Take 1-2 tablets (5-10 mg) by mouth at onset of headache for migraine May repeat dose in 2 hours.  Do not exceed 30 mg in 24 hours, Disp: 18 tablet, Rfl: 3     losartan (COZAAR) 25 MG tablet, TAKE ONE TABLET BY MOUTH EVERY DAY (NEED TO BE SEEN IN CLINIC FOR FURTHER REFILLS), Disp: 90 tablet, Rfl: 1     LORazepam (ATIVAN) 0.5 MG tablet, Take 0.5-1 tablets (0.25-0.5 mg) by mouth every 8 hours as needed for anxiety Take 30 minutes prior to departure.  Do not operate a vehicle after taking this medication, Disp: 20 tablet, Rfl: 1     scopolamine (TRANSDERM) 72 hr patch, Place 1 patch onto the skin every 72 hours.  Apply to hairless area behind one ear at least 4 hours before travel.  Remove old patch and change every 3 days., Disp: 6 patch, Rfl: 2     hydrOXYzine (VISTARIL) 25 MG capsule, Take 1 capsule (25 mg) by mouth 3 times daily as needed for anxiety, Disp: 20 capsule, Rfl: 1     ibuprofen (ADVIL,MOTRIN) 400 MG tablet, Take 1-2 tablets by mouth every 6 hours as needed (cramping)., Disp: 60 tablet, Rfl: 0     Ferrous Sulfate (IRON SUPPLEMENT PO), Take  by mouth., Disp: , Rfl:      Cholecalciferol (VITAMIN D) 1000 UNIT capsule, Take 1 capsule by mouth daily., Disp: , Rfl:      folic acid 800 MCG TABS, Take  by mouth., Disp: , Rfl:      CENTRUM OR TABS, 1 TABLET DAILY, Disp: , Rfl:      cetirizine (ZYRTEC) 10 MG tablet, Take 10 mg by mouth daily., Disp: , Rfl:   Immunization History   Administered Date(s) Administered     Influenza (IIV3) 11/13/2012     Influenza Vaccine IM 3yrs+ 4 Valent IIV4 10/26/2015, 09/29/2017     TD (ADULT, 7+) 04/22/2003     Allergies   Allergen  Reactions     Iodine Hives     xray dye     Penicillins Hives     Tetanus Toxoid Other (See Comments)     Fingers started to curl and get stiff. And did have a fever of  104.  Lasted for   1 1/2 days.  And the reaction started about 6 hours after the injection.       Hydrochlorothiazide Rash     Lexapro [Escitalopram Oxalate] Rash     Sulfa Drugs Rash         EXAM:   BP (!) 158/96  Pulse 73  Wt 75.7 kg (166 lb 12.8 oz)  LMP 10/11/2017  SpO2 98%  BMI 26.94 kg/m2  GENERAL APPEARANCE: alert, cooperative and not in distress  SKIN: no rashes, no lesions  HEAD: atraumatic, normocephalic  EYES: lids and lashes normal, conjunctivae and sclerae clear, pupils equal, round, reactive to light, EOM full and intact  ENT: no scars or lesions, nasal exam showed no discharge, swelling or lesions noted, otoscopy showed external auditory canals clear, tympanic membranes normal, tongue midline and normal, soft palate, uvula, and tonsils normal  NECK: no asymmetry, masses, or scars, supple without significant adenopathy  LUNGS: unlabored respirations, no intercostal retractions or accessory muscle use, clear to auscultation without rales or wheezes  HEART: regular rate and rhythm without murmurs and normal S1 and S2  MUSCULOSKELETAL: no musculoskeletal defects are noted  NEURO: no focal deficits noted  PSYCH: does not appear depressed or anxious    WORKUP: None    ASSESSMENT/PLAN:  Margie Becker is a 47 year old female here for evaluation of previous reaction to vaccines and possible food allergies. After tetanus vaccine several years ago Margie developed a fever and fatigue which resolved within 48 hours with no other associated symptoms. She was counseled that these symptoms are not consistent with an allergic reaction and these previous symptoms would not be a contraindication for her to receive tetanus vaccine in the future. Margie was also counseled regarding the signs and symptoms of an IgE mediated food allergy vs an  intolerance or sensitivity. Her symptoms do not occur with all exposure to wine or olives and have only developed when consuming non-organic varieties. This is not consistent with an IgE mediated food allergy and may be due to some sort of intolerance or sensitivity to preservatives or components in these foods. She was advised to avoid non-organic olives and wine due to the discomfort associated with the rash but no further testing or evaluation is indicated at this time.    1. No contraindication to receiving future tetanus vaccines  2. Continue to avoid non-organic wine and olives  3. Follow-up in the allergy clinic as needed      Augustin Ness MD  Allergy/Immunology  [place], [place]      Chart documentation done in part with Dragon Voice Recognition Software. Although reviewed after completion, some word and grammatical errors may remain.      Again, thank you for allowing me to participate in the care of your patient.        Sincerely,        Augustin Ness MD

## 2017-10-23 NOTE — PATIENT INSTRUCTIONS
If you have any questions regarding your allergies, asthma, or what we discussed during your visit today please call the allergy clinic or contact us via [email or web address].    [facility]: [phone]

## 2017-10-24 LAB
COPATH REPORT: NORMAL
PAP: NORMAL

## 2017-10-26 LAB
FINAL DIAGNOSIS: NORMAL
HPV HR 12 DNA CVX QL NAA+PROBE: NEGATIVE
HPV16 DNA SPEC QL NAA+PROBE: NEGATIVE
HPV18 DNA SPEC QL NAA+PROBE: NEGATIVE
SPECIMEN DESCRIPTION: NORMAL

## 2018-01-16 ENCOUNTER — MYC REFILL (OUTPATIENT)
Dept: FAMILY MEDICINE | Facility: CLINIC | Age: 48
End: 2018-01-16

## 2018-01-16 DIAGNOSIS — T75.3XXA MOTION SICKNESS, INITIAL ENCOUNTER: ICD-10-CM

## 2018-01-17 RX ORDER — SCOLOPAMINE TRANSDERMAL SYSTEM 1 MG/1
PATCH, EXTENDED RELEASE TRANSDERMAL
Qty: 6 PATCH | Refills: 2 | Status: SHIPPED | OUTPATIENT
Start: 2018-01-17 | End: 2019-08-30

## 2018-01-17 NOTE — TELEPHONE ENCOUNTER
Routing refill request to provider for review/approval because:  Drug not on the [facility] refill protocol     Mitch Anton RN

## 2018-03-25 ENCOUNTER — HOSPITAL ENCOUNTER (EMERGENCY)
Facility: CLINIC | Age: 48
Discharge: HOME | End: 2018-03-25
Attending: EMERGENCY MEDICINE | Admitting: EMERGENCY MEDICINE
Payer: COMMERCIAL

## 2018-03-25 VITALS
WEIGHT: 160 LBS | OXYGEN SATURATION: 96 % | BODY MASS INDEX: 25.84 KG/M2 | DIASTOLIC BLOOD PRESSURE: 82 MMHG | HEART RATE: 123 BPM | TEMPERATURE: 99.4 F | SYSTOLIC BLOOD PRESSURE: 152 MMHG | RESPIRATION RATE: 24 BRPM

## 2018-03-25 DIAGNOSIS — R19.7 VOMITING AND DIARRHEA: ICD-10-CM

## 2018-03-25 DIAGNOSIS — R11.10 VOMITING AND DIARRHEA: ICD-10-CM

## 2018-03-25 LAB
ALBUMIN SERPL-MCNC: 3.6 G/DL (ref 3.4–5)
ALBUMIN UR-MCNC: NEGATIVE MG/DL
ALP SERPL-CCNC: 65 U/L (ref 40–150)
ALT SERPL W P-5'-P-CCNC: 21 U/L (ref 0–50)
ANION GAP SERPL CALCULATED.3IONS-SCNC: 12 MMOL/L (ref 3–14)
APPEARANCE UR: CLEAR
AST SERPL W P-5'-P-CCNC: 12 U/L (ref 0–45)
BASOPHILS # BLD AUTO: 0 10E9/L (ref 0–0.2)
BASOPHILS NFR BLD AUTO: 0 %
BILIRUB SERPL-MCNC: 0.9 MG/DL (ref 0.2–1.3)
BILIRUB UR QL STRIP: NEGATIVE
BUN SERPL-MCNC: 15 MG/DL (ref 7–30)
CALCIUM SERPL-MCNC: 8.2 MG/DL (ref 8.5–10.1)
CHLORIDE SERPL-SCNC: 105 MMOL/L (ref 94–109)
CO2 SERPL-SCNC: 23 MMOL/L (ref 20–32)
COLOR UR AUTO: YELLOW
CREAT SERPL-MCNC: 0.93 MG/DL (ref 0.52–1.04)
DIFFERENTIAL METHOD BLD: ABNORMAL
EOSINOPHIL # BLD AUTO: 0 10E9/L (ref 0–0.7)
EOSINOPHIL NFR BLD AUTO: 0.1 %
ERYTHROCYTE [DISTWIDTH] IN BLOOD BY AUTOMATED COUNT: 12.4 % (ref 10–15)
GFR SERPL CREATININE-BSD FRML MDRD: 64 ML/MIN/1.7M2
GLUCOSE SERPL-MCNC: 133 MG/DL (ref 70–99)
GLUCOSE UR STRIP-MCNC: NEGATIVE MG/DL
HCG UR QL: NEGATIVE
HCT VFR BLD AUTO: 43.2 % (ref 35–47)
HGB BLD-MCNC: 14.5 G/DL (ref 11.7–15.7)
HGB UR QL STRIP: NEGATIVE
IMM GRANULOCYTES # BLD: 0 10E9/L (ref 0–0.4)
IMM GRANULOCYTES NFR BLD: 0.3 %
KETONES UR STRIP-MCNC: NEGATIVE MG/DL
LACTATE BLD-SCNC: 2.7 MMOL/L (ref 0.7–2)
LEUKOCYTE ESTERASE UR QL STRIP: NEGATIVE
LIPASE SERPL-CCNC: 59 U/L (ref 73–393)
LYMPHOCYTES # BLD AUTO: 0.2 10E9/L (ref 0.8–5.3)
LYMPHOCYTES NFR BLD AUTO: 2.9 %
MCH RBC QN AUTO: 31.6 PG (ref 26.5–33)
MCHC RBC AUTO-ENTMCNC: 33.6 G/DL (ref 31.5–36.5)
MCV RBC AUTO: 94 FL (ref 78–100)
MONOCYTES # BLD AUTO: 0.4 10E9/L (ref 0–1.3)
MONOCYTES NFR BLD AUTO: 5.3 %
NEUTROPHILS # BLD AUTO: 6.4 10E9/L (ref 1.6–8.3)
NEUTROPHILS NFR BLD AUTO: 91.4 %
NITRATE UR QL: NEGATIVE
NRBC # BLD AUTO: 0 10*3/UL
NRBC BLD AUTO-RTO: 0 /100
PH UR STRIP: 6 PH (ref 5–7)
PLATELET # BLD AUTO: 229 10E9/L (ref 150–450)
POTASSIUM SERPL-SCNC: 3.4 MMOL/L (ref 3.4–5.3)
PROT SERPL-MCNC: 7.7 G/DL (ref 6.8–8.8)
RBC # BLD AUTO: 4.59 10E12/L (ref 3.8–5.2)
SODIUM SERPL-SCNC: 140 MMOL/L (ref 133–144)
SOURCE: NORMAL
SP GR UR STRIP: 1.02 (ref 1–1.03)
UROBILINOGEN UR STRIP-MCNC: NORMAL MG/DL (ref 0–2)
WBC # BLD AUTO: 7 10E9/L (ref 4–11)

## 2018-03-25 PROCEDURE — 25000128 H RX IP 250 OP 636

## 2018-03-25 PROCEDURE — 96375 TX/PRO/DX INJ NEW DRUG ADDON: CPT | Performed by: EMERGENCY MEDICINE

## 2018-03-25 PROCEDURE — 25000128 H RX IP 250 OP 636: Performed by: FAMILY MEDICINE

## 2018-03-25 PROCEDURE — 25000128 H RX IP 250 OP 636: Performed by: EMERGENCY MEDICINE

## 2018-03-25 PROCEDURE — 83690 ASSAY OF LIPASE: CPT | Performed by: EMERGENCY MEDICINE

## 2018-03-25 PROCEDURE — 83605 ASSAY OF LACTIC ACID: CPT | Performed by: EMERGENCY MEDICINE

## 2018-03-25 PROCEDURE — 85025 COMPLETE CBC W/AUTO DIFF WBC: CPT | Performed by: EMERGENCY MEDICINE

## 2018-03-25 PROCEDURE — 99284 EMERGENCY DEPT VISIT MOD MDM: CPT | Mod: 25 | Performed by: EMERGENCY MEDICINE

## 2018-03-25 PROCEDURE — 96376 TX/PRO/DX INJ SAME DRUG ADON: CPT | Performed by: EMERGENCY MEDICINE

## 2018-03-25 PROCEDURE — 99284 EMERGENCY DEPT VISIT MOD MDM: CPT | Mod: Z6 | Performed by: EMERGENCY MEDICINE

## 2018-03-25 PROCEDURE — 96361 HYDRATE IV INFUSION ADD-ON: CPT | Performed by: EMERGENCY MEDICINE

## 2018-03-25 PROCEDURE — 81003 URINALYSIS AUTO W/O SCOPE: CPT | Performed by: EMERGENCY MEDICINE

## 2018-03-25 PROCEDURE — 80053 COMPREHEN METABOLIC PANEL: CPT | Performed by: EMERGENCY MEDICINE

## 2018-03-25 PROCEDURE — 81025 URINE PREGNANCY TEST: CPT | Performed by: EMERGENCY MEDICINE

## 2018-03-25 PROCEDURE — 96374 THER/PROPH/DIAG INJ IV PUSH: CPT | Performed by: EMERGENCY MEDICINE

## 2018-03-25 RX ORDER — ONDANSETRON 4 MG/1
4 TABLET, ORALLY DISINTEGRATING ORAL EVERY 8 HOURS PRN
Qty: 10 TABLET | Refills: 0 | Status: SHIPPED | OUTPATIENT
Start: 2018-03-25 | End: 2018-03-28

## 2018-03-25 RX ORDER — SODIUM CHLORIDE 9 MG/ML
INJECTION, SOLUTION INTRAVENOUS CONTINUOUS
Status: DISCONTINUED | OUTPATIENT
Start: 2018-03-25 | End: 2018-03-25 | Stop reason: HOSPADM

## 2018-03-25 RX ORDER — SODIUM CHLORIDE, SODIUM LACTATE, POTASSIUM CHLORIDE, CALCIUM CHLORIDE 600; 310; 30; 20 MG/100ML; MG/100ML; MG/100ML; MG/100ML
1000 INJECTION, SOLUTION INTRAVENOUS CONTINUOUS
Status: DISCONTINUED | OUTPATIENT
Start: 2018-03-25 | End: 2018-03-25 | Stop reason: HOSPADM

## 2018-03-25 RX ORDER — ONDANSETRON 4 MG/1
4 TABLET, FILM COATED ORAL EVERY 6 HOURS PRN
Qty: 6 TABLET | Refills: 0 | Status: SHIPPED | OUTPATIENT
Start: 2018-03-25 | End: 2018-05-17

## 2018-03-25 RX ORDER — ONDANSETRON 2 MG/ML
4 INJECTION INTRAMUSCULAR; INTRAVENOUS EVERY 30 MIN PRN
Status: COMPLETED | OUTPATIENT
Start: 2018-03-25 | End: 2018-03-25

## 2018-03-25 RX ORDER — PROCHLORPERAZINE MALEATE 10 MG
10 TABLET ORAL EVERY 6 HOURS PRN
Qty: 6 TABLET | Refills: 0 | Status: SHIPPED | OUTPATIENT
Start: 2018-03-25 | End: 2018-05-17

## 2018-03-25 RX ORDER — LOSARTAN POTASSIUM 25 MG/1
25 TABLET ORAL DAILY
Qty: 30 TABLET | Refills: 0 | Status: SHIPPED | OUTPATIENT
Start: 2018-03-25 | End: 2018-04-25

## 2018-03-25 RX ORDER — SODIUM CHLORIDE 9 MG/ML
INJECTION, SOLUTION INTRAVENOUS
Status: COMPLETED
Start: 2018-03-25 | End: 2018-03-25

## 2018-03-25 RX ADMIN — PROCHLORPERAZINE EDISYLATE 10 MG: 5 INJECTION INTRAMUSCULAR; INTRAVENOUS at 20:27

## 2018-03-25 RX ADMIN — SODIUM CHLORIDE: 9 INJECTION, SOLUTION INTRAVENOUS at 20:29

## 2018-03-25 RX ADMIN — SODIUM CHLORIDE, POTASSIUM CHLORIDE, SODIUM LACTATE AND CALCIUM CHLORIDE 1000 ML: 600; 310; 30; 20 INJECTION, SOLUTION INTRAVENOUS at 14:42

## 2018-03-25 RX ADMIN — SODIUM CHLORIDE, POTASSIUM CHLORIDE, SODIUM LACTATE AND CALCIUM CHLORIDE 1000 ML: 600; 310; 30; 20 INJECTION, SOLUTION INTRAVENOUS at 14:07

## 2018-03-25 RX ADMIN — SODIUM CHLORIDE, POTASSIUM CHLORIDE, SODIUM LACTATE AND CALCIUM CHLORIDE 1000 ML: 600; 310; 30; 20 INJECTION, SOLUTION INTRAVENOUS at 15:40

## 2018-03-25 RX ADMIN — SODIUM CHLORIDE: 0.9 INJECTION, SOLUTION INTRAVENOUS at 20:29

## 2018-03-25 RX ADMIN — ONDANSETRON 4 MG: 2 INJECTION INTRAMUSCULAR; INTRAVENOUS at 19:44

## 2018-03-25 RX ADMIN — ONDANSETRON 4 MG: 2 INJECTION INTRAMUSCULAR; INTRAVENOUS at 14:07

## 2018-03-25 RX ADMIN — ONDANSETRON 4 MG: 2 INJECTION INTRAMUSCULAR; INTRAVENOUS at 18:41

## 2018-03-25 ASSESSMENT — ENCOUNTER SYMPTOMS
BLOOD IN STOOL: 0
SHORTNESS OF BREATH: 0
VOMITING: 1
NAUSEA: 1
FEVER: 0
ABDOMINAL PAIN: 0
DIARRHEA: 1

## 2018-03-25 NOTE — ED AVS SNAPSHOT
[facility], Emergency Department    [address]    [facility] [place] [address]    Phone:  [phone]    Fax:  [phone]                                       Margie Becker   MRN: [id number]    Department:  [facility], Emergency Department   Date of Visit:  3/25/2018           After Visit Summary Signature Page     I have received my discharge instructions, and my questions have been answered. I have discussed any challenges I see with this plan with the nurse or doctor.    ..........................................................................................................................................  Patient/Patient Representative Signature      ..........................................................................................................................................  Patient Representative Print Name and Relationship to Patient    ..................................................               ................................................  Date                                            Time    ..........................................................................................................................................  Reviewed by Signature/Title    ...................................................              ..............................................  Date                                                            Time

## 2018-03-25 NOTE — ED PROVIDER NOTES
History     Chief Complaint   Patient presents with     Vomiting     family has a bug: vomited about 20x since 2300 last night     HPI  Margie Becker is a 47 year old female who presents for evaluation of nausea vomiting.  The patient reports that at approximately 11 PM last night, 14 hours prior to arrival, she acutely developed nausea.  Since then, she states that she has been vomiting every 20-30 minutes.  She has also had several episodes of diarrheal stools in that timeframe.  The patient has since that placed a scopolamine patch and this has provided some minimal relief.  The patient endorses abdominal cramping, otherwise no abdominal pain.  She also denies any urinary complaints, such as dysuria or hematuria.  She has not had any vaginal bleeding, reports last menstrual period was partially 2 weeks ago was normal at that time.  The patient has felt intermittently hot and cold, otherwise has not noted fevers per se.  She denies any cough.  She states that she has not had any new medication changes recently.  As above, the patient has a history of hypertension but reports that she is otherwise healthy.  She states that she did have a kidney stone a number of years ago, but states that her symptoms at that time did not feel like how she feels currently.  Notably, the patient and her  reports that there 5-year-old daughter was recently sick with a similar GI upset.    Current Facility-Administered Medications   Medication     lactated ringers infusion     ondansetron (ZOFRAN) injection 4 mg     Current Outpatient Prescriptions   Medication     ondansetron (ZOFRAN ODT) 4 MG ODT tab     scopolamine (TRANSDERM) 72 hr patch     rizatriptan (MAXALT) 5 MG tablet     losartan (COZAAR) 25 MG tablet     LORazepam (ATIVAN) 0.5 MG tablet     hydrOXYzine (VISTARIL) 25 MG capsule     cetirizine (ZYRTEC) 10 MG tablet     ibuprofen (ADVIL,MOTRIN) 400 MG tablet     Ferrous Sulfate (IRON SUPPLEMENT PO)      Cholecalciferol (VITAMIN D) 1000 UNIT capsule     folic acid 800 MCG TABS     CENTRUM OR TABS     Past Medical History:   Diagnosis Date     Allergic rhinitis, cause unspecified      Anemia      Depressive disorder, not elsewhere classified      Esophageal reflux      Fear of travel with panic attacks 2010     Hx of previous reproductive problem     ivf pregnancy     Migraines      Personal history of urinary calculi      PONV (postoperative nausea and vomiting)        Past Surgical History:   Procedure Laterality Date      SECTION  2013    Procedure:  SECTION;   Section;  Surgeon: Lola Fuentes MD;  [facility] L[facility]     DILATION AND CURETTAGE, OPERATIVE HYSTEROSCOPY, COMBINED  2011    Procedure:COMBINED DILATION AND CURETTAGE, OPERATIVE HYSTEROSCOPY; Removal Endometrial Polyp; Surgeon:LOLA FUENTES; Location:[facility] OR     HERNIA REPAIR      Inguinal     MYOMECTOMY UTERUS  2010     MYOMECTOMY UTERUS       RHINOPLASTY       SURGICAL HISTORY OF -       rhinoplasty       Family History   Problem Relation Age of Onset     Arthritis Mother      Thyroid Disease Mother      hypothyroidism     Hypertension Mother      Breast Cancer Mother      Depression Father      Alcohol/Drug Father      Neurologic Disorder Father      seizures     Lipids Father      DIABETES Maternal Grandmother      HEART DISEASE Maternal Grandfather      HEART DISEASE Paternal Grandmother      Genitourinary Problems Paternal Grandfather      Depression Sister      Depression Sister        Social History   Substance Use Topics     Smoking status: Never Smoker     Smokeless tobacco: Never Used     Alcohol use No     Allergies   Allergen Reactions     Iodine Hives     xray dye     Penicillins Hives     Tetanus Toxoid Other (See Comments)     Fingers started to curl and get stiff. And did have a fever of  104.  Lasted for   1 1/2 days.  And the reaction started about 6 hours after the  injection.       Hydrochlorothiazide Rash     Lexapro [Escitalopram Oxalate] Rash     Sulfa Drugs Rash       I have reviewed the Medications, Allergies, Past Medical and Surgical History, and Social History in the Epic system.    Review of Systems   Constitutional: Negative for fever.   Respiratory: Negative for shortness of breath.    Cardiovascular: Negative for chest pain.   Gastrointestinal: Positive for diarrhea, nausea and vomiting. Negative for abdominal pain and blood in stool.   All other systems reviewed and are negative.      Physical Exam   BP: 146/88  Pulse: 123  Heart Rate: 96  Temp: 100.9  F (38.3  C)  Resp: 16  Weight: 72.6 kg (160 lb)  SpO2: 99 %      Physical Exam Exam:  Constitutional: healthy, alert and no distress  Head: Normocephalic. No masses, lesions, tenderness or abnormalities  Neck: Neck supple. No adenopathy. Thyroid symmetric, normal size,, Carotids without bruits.  ENT: ENT exam normal, no neck nodes or sinus tenderness  Cardiovascular: negative, PMI normal. No lifts, heaves, or thrills. RRR. No murmurs, clicks gallops or rub  Respiratory: negative, Percussion normal. Good diaphragmatic excursion. Lungs clear  Gastrointestinal: Abdomen soft, non-tender. BS normal. No masses, organomegaly  : Deferred  Musculoskeletal: extremities normal- no gross deformities noted, gait normal and normal muscle tone  Skin: no suspicious lesions or rashes  Neurologic: Gait normal. Reflexes normal and symmetric. Sensation grossly WNL.  Psychiatric: mentation appears normal and affect normal/bright  Hematologic/Lymphatic/Immunologic: Normal cervical lymph nodes        ED Course     ED Course     Procedures             Labs Ordered and Resulted from Time of ED Arrival Up to the Time of Departure from the ED   CBC WITH PLATELETS DIFFERENTIAL - Abnormal; Notable for the following:        Result Value    Absolute Lymphocytes 0.2 (*)     All other components within normal limits   COMPREHENSIVE METABOLIC  PANEL - Abnormal; Notable for the following:     Glucose 133 (*)     Calcium 8.2 (*)     All other components within normal limits   LIPASE - Abnormal; Notable for the following:     Lipase 59 (*)     All other components within normal limits   LACTIC ACID WHOLE BLOOD - Abnormal; Notable for the following:     Lactic Acid 2.7 (*)     All other components within normal limits   UA MACROSCOPIC WITH REFLEX TO MICRO AND CULTURE   HCG QUALITATIVE URINE            Assessments & Plan (with Medical Decision Making)   MDM   this is a 47-year-old female with no prior medical history who is here with 1 day history of nausea, vomiting, diarrhea.  Patient states that is been going on since last night.  Patient is unable to tolerate any oral fluid hydration and she is having crampy type of abdominal pain anytime she has vomiting and diarrhea.  Patient does have a family member at home was sick with something similar for though not as bad as her.  Patient denies any other symptoms except she was noted to have tachycardia as well as some low-grade fever at triage.  Patient denies any fevers or chills or riders.  No cough or upper respiratory illness like symptoms.  Physical exam is unremarkable with soft abdomen.  Given her history and current findings, and concern for possible gastroenteritis versus pancreatitis versus hepatitis.  Currently awaiting some basic lab work as well as IV fluids and antiemetics.    Labs showed lactate slightly elevated which is not remarkable since she has been vomiting for the last  Day. Pt given zofran and iv fluids and after 2.5 L, she is feeling much better and will f/u with PCP. No evidence of acute abd and UA is no evidence of infection. Will d/c home with return instructions.    I have reviewed the nursing notes.    I have reviewed the findings, diagnosis, plan and need for follow up with the patient.    New Prescriptions    ONDANSETRON (ZOFRAN ODT) 4 MG ODT TAB    Take 1 tablet (4 mg) by mouth  every 8 hours as needed       Final diagnoses:   Vomiting and diarrhea     IAdán, am serving as a trained medical scribe to document services personally performed by Ceasar Phan MD, based on the provider's statements to me.      Ceasar GARAY MD, was physically present and have reviewed and verified the accuracy of this note documented by Adán Wyatt.    3/25/2018   [facility], EMERGENCY DEPARTMENT     Ceasar Phan MD  03/25/18 1939

## 2018-03-25 NOTE — ED AVS SNAPSHOT
[facility], Emergency Department    [address]    [place] [address]    Phone:  [phone]    Fax:  [phone]                                       Margie Becker   MRN: [id number]    Department:  [facility], Emergency Department   Date of Visit:  3/25/2018           Patient Information     Date Of Birth          1970        Your diagnoses for this visit were:     Vomiting and diarrhea        You were seen by Ceasar Phan MD and Darius Luu MD.        Discharge Instructions         Nonspecific Vomiting and Diarrhea (Adult)  Vomiting and diarrhea can have many causes, including:    Helping your body get rid of harmful substances     Gastroenteritis caused by viruses, parasites, bacteria, or toxins.    Allergy to or side effect of a food or medicine    Severe stress or worry (anxiety)     Other illnesses    Pregnancy  It is often hard to pinpoint an exact cause, even with testing. Vomiting and diarrhea often go away within a day or two without problems. If they continue, though, they can lead to too much loss of fluid (dehydration). This can be serious if not treated.    Home care  Medications    You may use acetaminophen or NSAID medicines like ibuprofen or naproxen to control fever, unless another medicine was prescribed. If you have chronic liver or kidney disease, talk with your healthcare provider before using these medicines. Also talk with your provider if you've had a stomach ulcer or gastrointestinal bleeding. Don't give aspirin to anyone under 18 years of age who is ill with a fever. Don't use NSAID medicines if you are already taking one for another condition (like arthritis) or are on aspirin (such as for heart disease or after a stroke)    If medicines for diarrhea or vomiting were prescribed, take these only as directed. Never take these without a healthcare provider s approval.  General care    If symptoms are severe, rest at home for the next 24 hours, or until  you are feeling better.    Washing your hands with soap and water, or using alcohol-based hand  is the best way to stop the spread of infection. Wash your hands after touching anyone who is sick.    Wash your hands after using the toilet and before meals. Clean the toilet after each use.    Caffeine, tobacco, and alcohol can make the diarrhea, cramping, and pain worse. Remember, caffeine not only is in coffee, but also is in chocolate, some energy drinks, and teas.  Diet    Water and clear liquids are important so you don't get dehydrated. Drink a small amount at a time. Don't guzzle down the drinks. That may increase your nausea, make cramping worse, and cause the drinks to come back up.    Sports drinks may also help. Make sure they are not too sugary, because this can sometimes make things worse. Also, don't drink beverages that are too acidic, like orange juice and grape juice.    If you are very dehydrated, sports drinks aren't a good choice. They have too much sugar and not enough electrolytes. In this case, commercially available products called oral rehydration solutions are best.  Food    Don't force yourself to eat, especially if you have cramps, diarrhea, or vomiting. Eat just a little at a time, and then wait a few minutes before you try to eat more.    Don't eat fatty, greasy, spicy, or fried foods.    Don't eat dairy products if you have diarrhea. They can make it worse.  During the first 24 hours (the first full day), follow the diet below:    Beverages: Sports drinks, soft drinks without caffeine, mineral water, and decaffeinated tea and coffee    Soups: Clear broth, consommé, and bouillon    Desserts: Plain gelatin, popsicles, and fruit juice bars  During the next 24 hours (the second day), you may add the following to the above if you are better. If not, continue what you did the first day:    Hot cereal, plain toast, bread, rolls, crackers    Plain noodles, rice, mashed potatoes, chicken  noodle or rice soup    Unsweetened canned fruit (avoid pineapple), bananas    Limit fat intake to less than 15 grams per day by avoiding margarine, butter, oils, mayonnaise, sauces, gravies, fried foods, peanut butter, meat, poultry, and fish.    Limit fiber. Avoid raw or cooked vegetables, fresh fruits (except bananas) and bran cereals.    Limit caffeine and chocolate. No spices or seasonings except salt.  During the next 24 hours:    Gradually resume a normal diet, as you feel better and your symptoms improve.    If at any time your symptoms start getting worse again, go back to clear liquids until you feel better.  Food preparation    If you have diarrhea, you should not prepare food for others. When preparing foods, wash your hands before and after.    Wash your hands or use alcohol-based  after using cutting boards, countertops, and knives that have been in contact with raw food.    Keep uncooked meats away from cooked and ready-to-eat foods.  Follow-up care  Follow up with your healthcare advisor, or as advised. Call if you do not get better in the next 2 to 3 days. If a stool (diarrhea) sample was taken, or cultures done, you will be told if they are positive, or if your treatment needs to be changed. You may call as directed for the results.  If X-rays were taken, and a radiologist has not yet looked at them, he or she will do so. You will be told if there is a change in the reading, especially if it affects your treatment.  Call 911  Call 911 if any of these occur:    Trouble breathing    Chest pain    Confusion    Severe drowsiness or trouble awakening    Fainting or loss of consciousness    Rapid heart rate    Seizure    Stiff neck    Severe weakness, dizziness, or lightheadedness  When to seek medical advice  Call your healthcare provider right away if any of these occur:    Bloody or black vomit or stools.    Severe, steady abdominal pain or any abdominal pain that is getting worse.    Severe  headache or stiff neck    An inability to hold down even sips of liquids for more than 12 hours.    Vomiting that lasts more than 24 hours.    Diarrhea that lasts more than 24 hours.    Fever of 100.4 F (38.0 C) or higher that lasts more than 48 hours, or as directed by your health care provider    Yellowish color to your skin or the whites of your eyes.    Signs of dehydration, such as dry mouth, little urine (less than every 6 hours), or very dark urine.  Date Last Reviewed: 1/3/2016    1786-1484 [employer]. [address] [place] [address]. All rights reserved. This information is not intended as a substitute for professional medical care. Always follow your healthcare professional's instructions.          Diet for Vomiting or Diarrhea (Adult)    Your symptoms may return or get worse after eating certain foods listed below. If this happens, stop eating these foods until your symptoms ease and you feel better.  Once the vomiting stops, follow the steps below.   During the first 12 to 24 hours  During the first 12 to 24 hours, follow this diet:    Drinks. Plain water, sport drinks like electrolyte solutions, soft drinks without caffeine, mineral water (plain or flavored), clear fruit juices, and decaffeinated tea and coffee.    Soups. Clear broth.    Desserts. Plain gelatin, popsicles, and fruit juice bars. As you feel better, you may add 6 to 8 ounces of yogurt per day. If you have diarrhea, don't have foods or drinks that contain sugar, high-fructose corn syrup, or sugar alcohols.  During the next 24 hours  During the next 24 hours you may add the following to the above:    Hot cereal, plain toast, bread, rolls, and crackers    Plain noodles, rice, mashed potatoes, and chicken noodle or rice soup    Unsweetened canned fruit (but not pineapple) and bananas  Don't eat more than 15 grams of fat a day. Do this by staying away from margarine, butter, oils, mayonnaise, sauces, gravies, fried  foods, peanut butter, meat, poultry, and fish.  Don't eat much fiber. Stay away from raw or cooked vegetables, fresh fruits (except bananas), and bran cereals.  Limit how much caffeine and chocolate you have. Do not use any spices or seasonings except salt.  During the next 24 hours  Slowly go back to your normal diet, as you feel better and your symptoms ease.  Date Last Reviewed: 8/1/2016 2000-2017 [employer]. [address] [place] [address]. All rights reserved. This information is not intended as a substitute for professional medical care. Always follow your healthcare professional's instructions.    Clear liquids for 12 to 24 hours- popsicles and Gator Aid G2- 2 oz every 15 to 20 minutes  If hungry BRAT diet- bananas, rice, apple sauce and white toast  If doing well add lean meats and fruits  The last foods to add back are milk products  Home with Zofran and compazine for the nausea- compazine will make sleepy. OK to use together.  Return if getting worse  Expect full resolution in 24 to 48 hours  If a low grade fever- ok to use tylenol- two 325 mg tablets every 6 hours  Temporary refill of losartan 25 mg per day      24 Hour Appointment Hotline       To make an appointment at any [facility] clinic, call [phone] ([phone]). If you don't have a family doctor or clinic, we will help you find one. [facility] clinics are conveniently located to serve the needs of you and your family.             Review of your medicines      START taking        Dose / Directions Last dose taken    ondansetron 4 MG ODT tab   Commonly known as:  ZOFRAN ODT   Dose:  4 mg   Quantity:  10 tablet        Take 1 tablet (4 mg) by mouth every 8 hours as needed   Refills:  0        ondansetron 4 MG tablet   Commonly known as:  ZOFRAN   Dose:  4 mg   Quantity:  6 tablet        Take 1 tablet (4 mg) by mouth every 6 hours as needed for nausea   Refills:  0        prochlorperazine 10 MG tablet   Commonly known  as:  COMPAZINE   Dose:  10 mg   Quantity:  6 tablet        Take 1 tablet (10 mg) by mouth every 6 hours as needed for nausea or vomiting   Refills:  0          CONTINUE these medicines which may have CHANGED, or have new prescriptions. If we are uncertain of the size of tablets/capsules you have at home, strength may be listed as something that might have changed.        Dose / Directions Last dose taken    losartan 25 MG tablet   Commonly known as:  COZAAR   Dose:  25 mg   What changed:  See the new instructions.   Quantity:  30 tablet        Take 1 tablet (25 mg) by mouth daily   Refills:  0          Our records show that you are taking the medicines listed below. If these are incorrect, please call your family doctor or clinic.        Dose / Directions Last dose taken    CENTRUM Tabs        1 TABLET DAILY   Refills:  0        cetirizine 10 MG tablet   Commonly known as:  zyrTEC   Dose:  10 mg        Take 10 mg by mouth daily.   Refills:  0        folic acid 800 MCG Tabs        Take  by mouth.   Refills:  0        hydrOXYzine 25 MG capsule   Commonly known as:  VISTARIL   Dose:  25 mg   Quantity:  20 capsule        Take 1 capsule (25 mg) by mouth 3 times daily as needed for anxiety   Refills:  1        ibuprofen 400 MG tablet   Commonly known as:  ADVIL/MOTRIN   Dose:  400-800 mg   Quantity:  60 tablet        Take 1-2 tablets by mouth every 6 hours as needed (cramping).   Refills:  0        IRON SUPPLEMENT PO        Take  by mouth.   Refills:  0        LORazepam 0.5 MG tablet   Commonly known as:  ATIVAN   Dose:  0.25-0.5 mg   Quantity:  20 tablet        Take 0.5-1 tablets (0.25-0.5 mg) by mouth every 8 hours as needed for anxiety Take 30 minutes prior to departure.  Do not operate a vehicle after taking this medication   Refills:  1        rizatriptan 5 MG tablet   Commonly known as:  MAXALT   Dose:  5-10 mg   Quantity:  18 tablet        Take 1-2 tablets (5-10 mg) by mouth at onset of headache for migraine May  repeat dose in 2 hours.  Do not exceed 30 mg in 24 hours   Refills:  3        scopolamine 72 hr patch   Commonly known as:  TRANSDERM   Quantity:  6 patch        Place 1 patch onto the skin every 72 hours.  Apply to hairless area behind one ear at least 4 hours before travel.  Remove old patch and change every 3 days.   Refills:  2        vitamin D 1000 UNITS capsule   Dose:  1 capsule        Take 1 capsule by mouth daily.   Refills:  0                Prescriptions were sent or printed at these locations (4 Prescriptions)                   Other Prescriptions                Printed at Department/Unit printer (4 of 4)         ondansetron (ZOFRAN ODT) 4 MG ODT tab               ondansetron (ZOFRAN) 4 MG tablet               prochlorperazine (COMPAZINE) 10 MG tablet               losartan (COZAAR) 25 MG tablet                Procedures and tests performed during your visit     CBC with platelets differential    Comprehensive metabolic panel    HCG qualitative urine (UPT)    Lactic acid whole blood    Lipase    UA reflex to Microscopic and Culture      Orders Needing Specimen Collection     None      Pending Results     No orders found from 3/23/2018 to 3/26/2018.            Pending Culture Results     No orders found from 3/23/2018 to 3/26/2018.            Pending Results Instructions     If you had any lab results that were not finalized at the time of your Discharge, you can call the ED Lab Result RN at [phone]. You will be contacted by this team for any positive Lab results or changes in treatment. The nurses are available 7 days a week from 10A to 6:30P.  You can leave a message 24 hours per day and they will return your call.        Thank you for choosing [facility]       Thank you for choosing [facility] for your care. Our goal is always to provide you with excellent care. Hearing back from our patients is one way we can continue to improve our services. Please take a few minutes to complete the written survey  that you may receive in the mail after you visit with us. Thank you!        [employer] Information     [employer] gives you secure access to your electronic health record. If you see a primary care provider, you can also send messages to your care team and make appointments. If you have questions, please call your primary care clinic.  If you do not have a primary care provider, please call [phone] and they will assist you.        Care EveryWhere ID     This is your Care EveryWhere ID. This could be used by other organizations to access your [facility] medical records  [id number]        Equal Access to Services     [facility] : Shazia Friend, radha kinsey, giulia davis . So [facility] [phone].    ATENCIÓN: Si habla español, tiene a guzman disposición servicios gratuitos de asistencia lingüística. Rachel al [phone].    We comply with applicable federal civil rights laws and [place] laws. We do not discriminate on the basis of race, color, national origin, age, disability, sex, sexual orientation, or gender identity.            After Visit Summary       This is your record. Keep this with you and show to your community pharmacist(s) and doctor(s) at your next visit.

## 2018-03-26 NOTE — ED NOTES
Inherited pt from Dr Phan  Still nausea and dry vomits  Added compazine with great improvement  Home  Taking popsicle with ease  VSS HR 90     Darius Luu MD  03/25/18 2129

## 2018-03-26 NOTE — DISCHARGE INSTRUCTIONS
Nonspecific Vomiting and Diarrhea (Adult)  Vomiting and diarrhea can have many causes, including:    Helping your body get rid of harmful substances     Gastroenteritis caused by viruses, parasites, bacteria, or toxins.    Allergy to or side effect of a food or medicine    Severe stress or worry (anxiety)     Other illnesses    Pregnancy  It is often hard to pinpoint an exact cause, even with testing. Vomiting and diarrhea often go away within a day or two without problems. If they continue, though, they can lead to too much loss of fluid (dehydration). This can be serious if not treated.    Home care  Medications    You may use acetaminophen or NSAID medicines like ibuprofen or naproxen to control fever, unless another medicine was prescribed. If you have chronic liver or kidney disease, talk with your healthcare provider before using these medicines. Also talk with your provider if you've had a stomach ulcer or gastrointestinal bleeding. Don't give aspirin to anyone under 18 years of age who is ill with a fever. Don't use NSAID medicines if you are already taking one for another condition (like arthritis) or are on aspirin (such as for heart disease or after a stroke)    If medicines for diarrhea or vomiting were prescribed, take these only as directed. Never take these without a healthcare provider s approval.  General care    If symptoms are severe, rest at home for the next 24 hours, or until you are feeling better.    Washing your hands with soap and water, or using alcohol-based hand  is the best way to stop the spread of infection. Wash your hands after touching anyone who is sick.    Wash your hands after using the toilet and before meals. Clean the toilet after each use.    Caffeine, tobacco, and alcohol can make the diarrhea, cramping, and pain worse. Remember, caffeine not only is in coffee, but also is in chocolate, some energy drinks, and teas.  Diet    Water and clear liquids are important  so you don't get dehydrated. Drink a small amount at a time. Don't guzzle down the drinks. That may increase your nausea, make cramping worse, and cause the drinks to come back up.    Sports drinks may also help. Make sure they are not too sugary, because this can sometimes make things worse. Also, don't drink beverages that are too acidic, like orange juice and grape juice.    If you are very dehydrated, sports drinks aren't a good choice. They have too much sugar and not enough electrolytes. In this case, commercially available products called oral rehydration solutions are best.  Food    Don't force yourself to eat, especially if you have cramps, diarrhea, or vomiting. Eat just a little at a time, and then wait a few minutes before you try to eat more.    Don't eat fatty, greasy, spicy, or fried foods.    Don't eat dairy products if you have diarrhea. They can make it worse.  During the first 24 hours (the first full day), follow the diet below:    Beverages: Sports drinks, soft drinks without caffeine, mineral water, and decaffeinated tea and coffee    Soups: Clear broth, consommé, and bouillon    Desserts: Plain gelatin, popsicles, and fruit juice bars  During the next 24 hours (the second day), you may add the following to the above if you are better. If not, continue what you did the first day:    Hot cereal, plain toast, bread, rolls, crackers    Plain noodles, rice, mashed potatoes, chicken noodle or rice soup    Unsweetened canned fruit (avoid pineapple), bananas    Limit fat intake to less than 15 grams per day by avoiding margarine, butter, oils, mayonnaise, sauces, gravies, fried foods, peanut butter, meat, poultry, and fish.    Limit fiber. Avoid raw or cooked vegetables, fresh fruits (except bananas) and bran cereals.    Limit caffeine and chocolate. No spices or seasonings except salt.  During the next 24 hours:    Gradually resume a normal diet, as you feel better and your symptoms improve.    If at  any time your symptoms start getting worse again, go back to clear liquids until you feel better.  Food preparation    If you have diarrhea, you should not prepare food for others. When preparing foods, wash your hands before and after.    Wash your hands or use alcohol-based  after using cutting boards, countertops, and knives that have been in contact with raw food.    Keep uncooked meats away from cooked and ready-to-eat foods.  Follow-up care  Follow up with your healthcare advisor, or as advised. Call if you do not get better in the next 2 to 3 days. If a stool (diarrhea) sample was taken, or cultures done, you will be told if they are positive, or if your treatment needs to be changed. You may call as directed for the results.  If X-rays were taken, and a radiologist has not yet looked at them, he or she will do so. You will be told if there is a change in the reading, especially if it affects your treatment.  Call 911  Call 911 if any of these occur:    Trouble breathing    Chest pain    Confusion    Severe drowsiness or trouble awakening    Fainting or loss of consciousness    Rapid heart rate    Seizure    Stiff neck    Severe weakness, dizziness, or lightheadedness  When to seek medical advice  Call your healthcare provider right away if any of these occur:    Bloody or black vomit or stools.    Severe, steady abdominal pain or any abdominal pain that is getting worse.    Severe headache or stiff neck    An inability to hold down even sips of liquids for more than 12 hours.    Vomiting that lasts more than 24 hours.    Diarrhea that lasts more than 24 hours.    Fever of 100.4 F (38.0 C) or higher that lasts more than 48 hours, or as directed by your health care provider    Yellowish color to your skin or the whites of your eyes.    Signs of dehydration, such as dry mouth, little urine (less than every 6 hours), or very dark urine.  Date Last Reviewed: 1/3/2016    2858-2637 [employer],  [employer] [address] [place] [address]. All rights reserved. This information is not intended as a substitute for professional medical care. Always follow your healthcare professional's instructions.          Diet for Vomiting or Diarrhea (Adult)    Your symptoms may return or get worse after eating certain foods listed below. If this happens, stop eating these foods until your symptoms ease and you feel better.  Once the vomiting stops, follow the steps below.   During the first 12 to 24 hours  During the first 12 to 24 hours, follow this diet:    Drinks. Plain water, sport drinks like electrolyte solutions, soft drinks without caffeine, mineral water (plain or flavored), clear fruit juices, and decaffeinated tea and coffee.    Soups. Clear broth.    Desserts. Plain gelatin, popsicles, and fruit juice bars. As you feel better, you may add 6 to 8 ounces of yogurt per day. If you have diarrhea, don't have foods or drinks that contain sugar, high-fructose corn syrup, or sugar alcohols.  During the next 24 hours  During the next 24 hours you may add the following to the above:    Hot cereal, plain toast, bread, rolls, and crackers    Plain noodles, rice, mashed potatoes, and chicken noodle or rice soup    Unsweetened canned fruit (but not pineapple) and bananas  Don't eat more than 15 grams of fat a day. Do this by staying away from margarine, butter, oils, mayonnaise, sauces, gravies, fried foods, peanut butter, meat, poultry, and fish.  Don't eat much fiber. Stay away from raw or cooked vegetables, fresh fruits (except bananas), and bran cereals.  Limit how much caffeine and chocolate you have. Do not use any spices or seasonings except salt.  During the next 24 hours  Slowly go back to your normal diet, as you feel better and your symptoms ease.  Date Last Reviewed: 8/1/2016 2000-2017 [employer]. [address] [place] [address]. All rights reserved. This information is not  intended as a substitute for professional medical care. Always follow your healthcare professional's instructions.    Clear liquids for 12 to 24 hours- popsicles and Gator Aid G2- 2 oz every 15 to 20 minutes  If hungry BRAT diet- bananas, rice, apple sauce and white toast  If doing well add lean meats and fruits  The last foods to add back are milk products  Home with Zofran and compazine for the nausea- compazine will make sleepy. OK to use together.  Return if getting worse  Expect full resolution in 24 to 48 hours  If a low grade fever- ok to use tylenol- two 325 mg tablets every 6 hours  Temporary refill of losartan 25 mg per day

## 2018-04-02 DIAGNOSIS — F40.01 FEAR OF TRAVEL WITH PANIC ATTACKS: ICD-10-CM

## 2018-04-03 RX ORDER — LORAZEPAM 0.5 MG/1
0.25-0.5 TABLET ORAL EVERY 8 HOURS PRN
Qty: 20 TABLET | Refills: 1 | Status: SHIPPED | OUTPATIENT
Start: 2018-04-03 | End: 2018-11-30

## 2018-04-03 NOTE — TELEPHONE ENCOUNTER
Chart reviewed    Ok to refill but will forward to PCP to review also.    Refill in completed folder

## 2018-04-03 NOTE — TELEPHONE ENCOUNTER
Ativan      Last Written Prescription Date:  9/29/17  Last Fill Quantity: 20,   # refills: 1  Last Office Visit: 10/20/17  Future Office visit:       Routing refill request to provider for review/approval because:  Drug not on the [facility], [facility] or [facility] refill protocol or controlled substance

## 2018-04-22 ENCOUNTER — TELEPHONE (OUTPATIENT)
Dept: FAMILY MEDICINE | Facility: CLINIC | Age: 48
End: 2018-04-22

## 2018-04-22 DIAGNOSIS — I10 HYPERTENSION GOAL BP (BLOOD PRESSURE) < 140/90: ICD-10-CM

## 2018-04-23 NOTE — TELEPHONE ENCOUNTER
"Requested Prescriptions   Pending Prescriptions Disp Refills     losartan (COZAAR) 25 MG tablet [Pharmacy Med Name: LOSARTAN POTASSIUM 25MG TABS]  Last Written Prescription Date:  3/25/2018  Last Fill Quantity: 30 tablet,  # refills: 0   Last office visit: 10/20/2017 with prescribing provider:  SHANE Augustin   Future Office Visit:     90 tablet 1     Sig: TAKE ONE TABLET BY MOUTH EVERY DAY ( NEED TO BE SEEN IN CLINIC FOR FURTHER REFILLS)    Angiotensin-II Receptors Failed    4/22/2018  9:41 AM       Failed - Blood pressure under 140/90 in past 12 months    BP Readings from Last 3 Encounters:   03/25/18 152/82   10/23/17 (!) 158/96   10/20/17 128/84          Passed - Recent (12 mo) or future (30 days) visit within the authorizing provider's specialty    Patient had office visit in the last 12 months or has a visit in the next 30 days with authorizing provider or within the authorizing provider's specialty.  See \"Patient Info\" tab in inbasket, or \"Choose Columns\" in Meds & Orders section of the refill encounter.           Passed - Patient is age 18 or older       Passed - No active pregnancy on record       Passed - Normal serum creatinine on file in past 12 months    Recent Labs   Lab Test  03/25/18   1405   CR  0.93          Passed - Normal serum potassium on file in past 12 months    Recent Labs   Lab Test  03/25/18   1405   POTASSIUM  3.4           Passed - No positive pregnancy test in past 12 months          "

## 2018-04-25 ENCOUNTER — MYC REFILL (OUTPATIENT)
Dept: FAMILY MEDICINE | Facility: CLINIC | Age: 48
End: 2018-04-25

## 2018-04-25 DIAGNOSIS — I10 HYPERTENSION GOAL BP (BLOOD PRESSURE) < 140/90: ICD-10-CM

## 2018-04-25 DIAGNOSIS — T75.3XXA MOTION SICKNESS, INITIAL ENCOUNTER: ICD-10-CM

## 2018-04-26 RX ORDER — SCOLOPAMINE TRANSDERMAL SYSTEM 1 MG/1
PATCH, EXTENDED RELEASE TRANSDERMAL
Qty: 6 PATCH | Refills: 2 | Status: CANCELLED | OUTPATIENT
Start: 2018-04-26

## 2018-04-26 RX ORDER — LOSARTAN POTASSIUM 25 MG/1
25 TABLET ORAL DAILY
Qty: 30 TABLET | Refills: 0 | Status: SHIPPED | OUTPATIENT
Start: 2018-04-26 | End: 2018-06-25 | Stop reason: DRUGHIGH

## 2018-04-26 RX ORDER — LOSARTAN POTASSIUM 25 MG/1
TABLET ORAL
Qty: 30 TABLET | Refills: 0 | Status: SHIPPED | OUTPATIENT
Start: 2018-04-26 | End: 2018-06-25 | Stop reason: DRUGHIGH

## 2018-04-26 NOTE — TELEPHONE ENCOUNTER
"Last Written Prescription Date:  4/25/18  Last Fill Quantity: 30,  # refills: 0   Last office visit: 10/20/2017 with prescribing provider:  10/20/17   Future Office Visit:   Next 5 appointments (look out 90 days)     May 17, 2018  2:00 PM CDT   [facility]illiana [facility]    [address] [place] [id number] [phone]                   Requested Prescriptions   Pending Prescriptions Disp Refills     scopolamine (TRANSDERM) 72 hr patch 6 patch 2     Sig: Place 1 patch onto the skin every 72 hours.  Apply to hairless area behind one ear at least 4 hours before travel.  Remove old patch and change every 3 days.    There is no refill protocol information for this order        losartan (COZAAR) 25 MG tablet 30 tablet 0     Sig: Take 1 tablet (25 mg) by mouth daily Patient needs appointment    Angiotensin-II Receptors Failed    4/26/2018  9:08 AM       Failed - Blood pressure under 140/90 in past 12 months    BP Readings from Last 3 Encounters:   03/25/18 152/82   10/23/17 (!) 158/96   10/20/17 128/84                Passed - Recent (12 mo) or future (30 days) visit within the authorizing provider's specialty    Patient had office visit in the last 12 months or has a visit in the next 30 days with authorizing provider or within the authorizing provider's specialty.  See \"Patient Info\" tab in inbasket, or \"Choose Columns\" in Meds & Orders section of the refill encounter.           Passed - Patient is age 18 or older       Passed - No active pregnancy on record       Passed - Normal serum creatinine on file in past 12 months    Recent Labs   Lab Test  03/25/18   1405   CR  0.93            Passed - Normal serum potassium on file in past 12 months    Recent Labs   Lab Test  03/25/18   1405   POTASSIUM  3.4                   Passed - No positive pregnancy test in past 12 months            "

## 2018-04-26 NOTE — TELEPHONE ENCOUNTER
For scopolamine:  Routing refill request to provider for review/approval because:  Drug not on the [facility] refill protocol       For Losartan:  Due for HTN f/up. Scheduled 5/17/18. Refilled x 1.    Hemant Hyatt RN

## 2018-04-27 NOTE — TELEPHONE ENCOUNTER
Left VM for her that her losartan was refilled yesterday for a month worth of medication, so everything should be ready to go.  Patient/family was instructed to return call to [facility] RN directly on the RN Call back line at [phone].  May close if no response.    Vero Crow RN

## 2018-04-27 NOTE — TELEPHONE ENCOUNTER
Patient would like to speak to a nurse about her medication Losartan patient is traveling and will run out please call patient [phone]

## 2018-04-28 ENCOUNTER — NURSE TRIAGE (OUTPATIENT)
Dept: NURSING | Facility: CLINIC | Age: 48
End: 2018-04-28

## 2018-04-28 NOTE — TELEPHONE ENCOUNTER
Patient returning message from clinic staff, she wanted to let them know she got her medication refill and she is ready for her trip, no additional needed or triage needed. She just wanted clinic staff to be aware and thank them for assisting in getting this addressed so quickly.     Asya Yanez RN, BSN  [facility]

## 2018-05-17 ENCOUNTER — OFFICE VISIT (OUTPATIENT)
Dept: FAMILY MEDICINE | Facility: CLINIC | Age: 48
End: 2018-05-17
Payer: COMMERCIAL

## 2018-05-17 VITALS
HEART RATE: 76 BPM | BODY MASS INDEX: 28.12 KG/M2 | TEMPERATURE: 98.5 F | HEIGHT: 66 IN | WEIGHT: 175 LBS | DIASTOLIC BLOOD PRESSURE: 99 MMHG | SYSTOLIC BLOOD PRESSURE: 155 MMHG | OXYGEN SATURATION: 96 %

## 2018-05-17 DIAGNOSIS — T75.3XXA MOTION SICKNESS, INITIAL ENCOUNTER: ICD-10-CM

## 2018-05-17 DIAGNOSIS — F40.01 FEAR OF TRAVEL WITH PANIC ATTACKS: ICD-10-CM

## 2018-05-17 DIAGNOSIS — I10 HYPERTENSION GOAL BP (BLOOD PRESSURE) < 140/90: ICD-10-CM

## 2018-05-17 PROCEDURE — 99214 OFFICE O/P EST MOD 30 MIN: CPT | Performed by: FAMILY MEDICINE

## 2018-05-17 RX ORDER — HYDROXYZINE PAMOATE 25 MG/1
25 CAPSULE ORAL 3 TIMES DAILY PRN
Qty: 20 CAPSULE | Refills: 1 | Status: SHIPPED | OUTPATIENT
Start: 2018-05-17 | End: 2019-01-11

## 2018-05-17 RX ORDER — LOSARTAN POTASSIUM 25 MG/1
25 TABLET ORAL DAILY
Qty: 30 TABLET | Refills: 0 | Status: CANCELLED | OUTPATIENT
Start: 2018-05-17

## 2018-05-17 RX ORDER — LOSARTAN POTASSIUM 50 MG/1
50 TABLET ORAL DAILY
Qty: 30 TABLET | Refills: 1 | Status: SHIPPED | OUTPATIENT
Start: 2018-05-17 | End: 2018-12-20

## 2018-05-17 NOTE — MR AVS SNAPSHOT
"              After Visit Summary   5/17/2018    Margie Becker    MRN: [id number]           Patient Information     Date Of Birth          1970        Visit Information        Provider Department      5/17/2018 2:00 PM Cheryl Augustin DO [facility]        Today's Diagnoses     Hypertension goal BP (blood pressure) < 140/90        Motion sickness, initial encounter        Fear of travel with panic attacks           Follow-ups after your visit        Who to contact     If you have questions or need follow up information about today's clinic visit or your schedule please contact [facility] directly at [phone].  Normal or non-critical lab and imaging results will be communicated to you by Sp[employer], letter or phone within 4 business days after the clinic has received the results. If you do not hear from us within 7 days, please contact the clinic through [employer] or phone. If you have a critical or abnormal lab result, we will notify you by phone as soon as possible.  Submit refill requests through [employer] or call your pharmacy and they will forward the refill request to us. Please allow 3 business days for your refill to be completed.          Additional Information About Your Visit        MyChart Information     [employer] gives you secure access to your electronic health record. If you see a primary care provider, you can also send messages to your care team and make appointments. If you have questions, please call your primary care clinic.  If you do not have a primary care provider, please call [phone] and they will assist you.        Care EveryWhere ID     This is your Care EveryWhere ID. This could be used by other organizations to access your [place] medical records  [id number]        Your Vitals Were     Pulse Temperature Height Pulse Oximetry BMI (Body Mass Index)       76 98.5  F (36.9  C) (Oral) 5' 5.98\" (1.676 m) 96% 28.26 kg/m2        Blood Pressure " from Last 3 Encounters:   05/17/18 (!) 155/99   03/25/18 152/82   10/23/17 (!) 158/96    Weight from Last 3 Encounters:   05/17/18 175 lb (79.4 kg)   03/25/18 160 lb (72.6 kg)   10/23/17 166 lb 12.8 oz (75.7 kg)              Today, you had the following     No orders found for display         Today's Medication Changes          These changes are accurate as of 5/17/18  3:05 PM.  If you have any questions, ask your nurse or doctor.               These medicines have changed or have updated prescriptions.        Dose/Directions    * losartan 25 MG tablet   Commonly known as:  COZAAR   This may have changed:  Another medication with the same name was added. Make sure you understand how and when to take each.   Used for:  Hypertension goal BP (blood pressure) < 140/90   Changed by:  Cheryl Augustin DO        TAKE ONE TABLET BY MOUTH EVERY DAY ( NEED TO BE SEEN IN CLINIC FOR FURTHER REFILLS)   Quantity:  30 tablet   Refills:  0       * losartan 25 MG tablet   Commonly known as:  COZAAR   This may have changed:  Another medication with the same name was added. Make sure you understand how and when to take each.   Used for:  Hypertension goal BP (blood pressure) < 140/90   Changed by:  Cheryl Augustin DO        Dose:  25 mg   Take 1 tablet (25 mg) by mouth daily Patient needs appointment   Quantity:  30 tablet   Refills:  0       * losartan 50 MG tablet   Commonly known as:  COZAAR   This may have changed:  You were already taking a medication with the same name, and this prescription was added. Make sure you understand how and when to take each.   Used for:  Hypertension goal BP (blood pressure) < 140/90   Changed by:  Cheryl Augustin DO        Dose:  50 mg   Take 1 tablet (50 mg) by mouth daily   Quantity:  30 tablet   Refills:  1       * Notice:  This list has 3 medication(s) that are the same as other medications prescribed for you. Read the directions carefully, and ask your doctor or other care provider to review them with  you.         Where to get your medicines      These [place] [facility] [place] [address]  [address] [facility] [facility] [address]     Phone:  [phone]     hydrOXYzine 25 MG capsule    losartan 50 MG tablet                Primary Care Provider Office Phone # Fax #    Cheryl Augustin [phone]       [address] [facility] [address]        Equal Access to Services     YI ROSA : Hadii aad ku hadasho Soomaali, waaxda luqadaha, qaybta kaalmada adeegyada, waxay idiin hayaan adeeg kharash la'jose alfredo . So [facility] [phone].    ATENCIÓN: Si habla español, tiene a guzman disposición servicios gratuitos de asistencia lingüística. [facility] [phone].    We comply with applicable federal civil rights laws and [place] laws. We do not discriminate on the basis of race, color, national origin, age, disability, sex, sexual orientation, or gender identity.            Thank you!     Thank you for choosing [facility]  for your care. Our goal is always to provide you with excellent care. Hearing back from our patients is one way we can continue to improve our services. Please take a few minutes to complete the written survey that you may receive in the mail after your visit with us. Thank you!             Your Updated Medication List - Protect others around you: Learn how to safely use, store and throw away your medicines at www.disposemymeds.org.          This list is accurate as of 5/17/18  3:05 PM.  Always use your most recent med list.                   Brand Name Dispense Instructions for use Diagnosis    CENTRUM Tabs      1 TABLET DAILY        cetirizine 10 MG tablet    zyrTEC     Take 10 mg by mouth daily.        folic acid 800 MCG Tabs      Take  by mouth.        hydrOXYzine 25 MG capsule    VISTARIL    20 capsule    Take 1 capsule (25 mg) by mouth 3 times daily as needed for anxiety    Fear of travel with panic  attacks       IRON SUPPLEMENT PO      Take  by mouth.        LORazepam 0.5 MG tablet    ATIVAN    20 tablet    Take 0.5-1 tablets (0.25-0.5 mg) by mouth every 8 hours as needed for anxiety Take 30 minutes prior to departure.  Do not operate a vehicle after taking this medication    Fear of travel with panic attacks       * losartan 25 MG tablet    COZAAR    30 tablet    TAKE ONE TABLET BY MOUTH EVERY DAY ( NEED TO BE SEEN IN CLINIC FOR FURTHER REFILLS)    Hypertension goal BP (blood pressure) < 140/90       * losartan 25 MG tablet    COZAAR    30 tablet    Take 1 tablet (25 mg) by mouth daily Patient needs appointment    Hypertension goal BP (blood pressure) < 140/90       * losartan 50 MG tablet    COZAAR    30 tablet    Take 1 tablet (50 mg) by mouth daily    Hypertension goal BP (blood pressure) < 140/90       rizatriptan 5 MG tablet    MAXALT    18 tablet    Take 1-2 tablets (5-10 mg) by mouth at onset of headache for migraine May repeat dose in 2 hours.  Do not exceed 30 mg in 24 hours    Migraine with aura and without status migrainosus, not intractable       scopolamine 72 hr patch    TRANSDERM    6 patch    Place 1 patch onto the skin every 72 hours.  Apply to hairless area behind one ear at least 4 hours before travel.  Remove old patch and change every 3 days.    Motion sickness, initial encounter       vitamin D 1000 units capsule      Take 1 capsule by mouth daily.        * Notice:  This list has 3 medication(s) that are the same as other medications prescribed for you. Read the directions carefully, and ask your doctor or other care provider to review them with you.

## 2018-05-17 NOTE — PROGRESS NOTES
"  SUBJECTIVE:   Margie Beckre is a 47 year old female who presents to clinic today for the following health issues:      Hypertension Follow-up      Outpatient blood pressures are being checked at home, about 2x's per week.  Results are 140s/80s or slightly below  This past week.    Low Salt Diet: \"trying\"    Cozaar 25 mg qd      Amount of exercise or physical activity: about 4-5 days per week    Problems taking medications regularly: No    Medication side effects: none    Diet: regular (no restrictions)      Patient would like to get something, in addition to the scopolamine Transderm patch, to take for flying.  She travels to [place] for work and often has to have a meeting when she gets to [place].  She had also tried vistaril and ativan.  If she sits at the front of the plane she feels better.             Problem list and histories reviewed & adjusted, as indicated.  Additional history: as documented    BP Readings from Last 3 Encounters:   05/17/18 (!) 155/99   03/25/18 152/82   10/23/17 (!) 158/96    Wt Readings from Last 3 Encounters:   05/17/18 175 lb (79.4 kg)   03/25/18 160 lb (72.6 kg)   10/23/17 166 lb 12.8 oz (75.7 kg)                    Reviewed and updated as needed this visit by clinical staff  Tobacco  Meds  Med Hx  Surg Hx  Fam Hx  Soc Hx      Reviewed and updated as needed this visit by Provider         ROS:  Constitutional, HEENT, cardiovascular, pulmonary, GI, , musculoskeletal, neuro, skin, endocrine and psych systems are negative, except as otherwise noted.    OBJECTIVE:     BP (!) 155/99 (BP Location: Right arm, Cuff Size: Adult Large)  Pulse 76  Temp 98.5  F (36.9  C) (Oral)  Ht 5' 5.98\" (1.676 m)  Wt 175 lb (79.4 kg)  SpO2 96%  BMI 28.26 kg/m2  Body mass index is 28.26 kg/(m^2).  GENERAL: healthy, alert and no distress  HENT: normal cephalic/atraumatic, oropharynx clear and oral mucous membranes moist  NECK: no adenopathy, no asymmetry, masses, or scars and thyroid normal to " palpation  RESP: lungs clear to auscultation - no rales, rhonchi or wheezes  CV: regular rate and rhythm, normal S1 S2, no S3 or S4, no murmur, click or rub, no peripheral edema and peripheral pulses strong  MS: no gross musculoskeletal defects noted, no edema  PSYCH: mentation appears normal, affect normal/bright    Diagnostic Test Results:  none     ASSESSMENT/PLAN:     Hypertension; uncontrolled.    Associated with the following complications:    None   Plan:  The following changes are made - increase of cozaar  Recheck in two weeks       ASSESSMENT/PLAN:      ICD-10-CM    1. Hypertension goal BP (blood pressure) < 140/90 I10 losartan (COZAAR) 50 MG tablet   2. Motion sickness, initial encounter T75.3XXA     related to flying and boats, buses   3. 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

## 2018-05-31 ENCOUNTER — MYC MEDICAL ADVICE (OUTPATIENT)
Dept: FAMILY MEDICINE | Facility: CLINIC | Age: 48
End: 2018-05-31

## 2018-06-25 ENCOUNTER — OFFICE VISIT (OUTPATIENT)
Dept: FAMILY MEDICINE | Facility: CLINIC | Age: 48
End: 2018-06-25
Payer: COMMERCIAL

## 2018-06-25 VITALS
OXYGEN SATURATION: 97 % | HEART RATE: 76 BPM | HEIGHT: 66 IN | TEMPERATURE: 98.2 F | WEIGHT: 176 LBS | DIASTOLIC BLOOD PRESSURE: 86 MMHG | BODY MASS INDEX: 28.28 KG/M2 | SYSTOLIC BLOOD PRESSURE: 144 MMHG

## 2018-06-25 DIAGNOSIS — Z23 NEED FOR PROPHYLACTIC VACCINATION WITH TETANUS-DIPHTHERIA (TD): ICD-10-CM

## 2018-06-25 DIAGNOSIS — I10 HYPERTENSION GOAL BP (BLOOD PRESSURE) < 140/90: Primary | ICD-10-CM

## 2018-06-25 DIAGNOSIS — R11.0 NAUSEA: ICD-10-CM

## 2018-06-25 PROCEDURE — 99214 OFFICE O/P EST MOD 30 MIN: CPT | Mod: 25 | Performed by: FAMILY MEDICINE

## 2018-06-25 PROCEDURE — 90471 IMMUNIZATION ADMIN: CPT | Performed by: FAMILY MEDICINE

## 2018-06-25 PROCEDURE — 90715 TDAP VACCINE 7 YRS/> IM: CPT | Performed by: FAMILY MEDICINE

## 2018-06-25 RX ORDER — LOSARTAN POTASSIUM 100 MG/1
100 TABLET ORAL DAILY
Qty: 30 TABLET | Refills: 1 | Status: SHIPPED | OUTPATIENT
Start: 2018-06-25 | End: 2018-07-25

## 2018-06-25 RX ORDER — ONDANSETRON 4 MG/1
4-8 TABLET, ORALLY DISINTEGRATING ORAL EVERY 8 HOURS PRN
Qty: 20 TABLET | Refills: 1 | Status: SHIPPED | OUTPATIENT
Start: 2018-06-25 | End: 2019-01-04

## 2018-06-25 RX ORDER — LOSARTAN POTASSIUM 50 MG/1
50 TABLET ORAL DAILY
Qty: 30 TABLET | Refills: 1 | Status: CANCELLED | OUTPATIENT
Start: 2018-06-25

## 2018-06-25 NOTE — MR AVS SNAPSHOT
After Visit Summary   6/25/2018    Margie Becker    MRN: [id number]           Patient Information     Date Of Birth          1970        Visit Information        Provider Department      6/25/2018 2:40 PM Cheryl Augustin DO [facility]        Today's Diagnoses     Hypertension goal BP (blood pressure) < 140/90    -  1    Need for prophylactic vaccination with tetanus-diphtheria (TD)        Nausea           Follow-ups after your visit        Follow-up notes from your care team     Return in about 6 months (around 12/25/2018).      Who to contact     If you have questions or need follow up information about today's clinic visit or your schedule please contact [facility] directly at [phone].  Normal or non-critical lab and imaging results will be communicated to you by Zapc[employer]hart, letter or phone within 4 business days after the clinic has received the results. If you do not hear from us within 7 days, please contact the clinic through [employer] or phone. If you have a critical or abnormal lab result, we will notify you by phone as soon as possible.  Submit refill requests through [employer]mi or call your pharmacy and they will forward the refill request to us. Please allow 3 business days for your refill to be completed.          Additional Information About Your Visit        MyChart Information     [employer] gives you secure access to your electronic health record. If you see a primary care provider, you can also send messages to your care team and make appointments. If you have questions, please call your primary care clinic.  If you do not have a primary care provider, please call [phone] and they will assist you.        Care EveryWhere ID     This is your Care EveryWhere ID. This could be used by other organizations to access your [place] medical records  [id number]        Your Vitals Were     Pulse Temperature Height Pulse Oximetry BMI (Body Mass  "Index)       76 98.2  F (36.8  C) (Oral) 5' 5.98\" (1.676 m) 97% 28.42 kg/m2        Blood Pressure from Last 3 Encounters:   06/25/18 142/87   05/17/18 (!) 155/99   03/25/18 152/82    Weight from Last 3 Encounters:   06/25/18 176 lb (79.8 kg)   05/17/18 175 lb (79.4 kg)   03/25/18 160 lb (72.6 kg)              We Performed the Following     TDAP VACCINE (ADACEL)          Today's Medication Changes          These changes are accurate as of 6/25/18  3:20 PM.  If you have any questions, ask your nurse or doctor.               Start taking these medicines.        Dose/Directions    ondansetron 4 MG ODT tab   Commonly known as:  ZOFRAN ODT   Used for:  Nausea   Started by:  Cheryl Augustin DO        Dose:  4-8 mg   Take 1-2 tablets (4-8 mg) by mouth every 8 hours as needed for nausea   Quantity:  20 tablet   Refills:  1         These medicines have changed or have updated prescriptions.        Dose/Directions    * losartan 50 MG tablet   Commonly known as:  COZAAR   This may have changed:    - Another medication with the same name was changed. Make sure you understand how and when to take each.  - Another medication with the same name was removed. Continue taking this medication, and follow the directions you see here.   Used for:  Hypertension goal BP (blood pressure) < 140/90   Changed by:  Cheryl Augustin DO        Dose:  50 mg   Take 1 tablet (50 mg) by mouth daily   Quantity:  30 tablet   Refills:  1       * losartan 100 MG tablet   Commonly known as:  COZAAR   This may have changed:    - medication strength  - See the new instructions.  - Another medication with the same name was removed. Continue taking this medication, and follow the directions you see here.   Used for:  Hypertension goal BP (blood pressure) < 140/90   Changed by:  Cheryl Augustin DO        Dose:  100 mg   Take 1 tablet (100 mg) by mouth daily   Quantity:  30 tablet   Refills:  1       * Notice:  This list has 2 medication(s) that are the same as other " medications prescribed for you. Read the directions carefully, and ask your doctor or other care provider to review them with you.         Where to get your medicines      These medications [place] [facility] [place] - [address]  [address] [facility] [facility] [address]     Phone:  [phone]     losartan 100 MG tablet    ondansetron 4 MG ODT tab                Primary Care Provider Office Phone # Fax #    Cheryl Augustin [phone]       [address] [place] [address]        Equal Access to Services     [facility]: Hadii aad ku hadasho Soomaali, waaxda luqadaha, qaybta kaalmada adeegyada, giulia ureña . So [facility] [phone].    ATENCIÓN: Si habla español, tiene a guzman disposición servicios gratuitos de asistencia lingüística. Llame al [phone].    We comply with applicable federal civil rights laws and [place] laws. We do not discriminate on the basis of race, color, national origin, age, disability, sex, sexual orientation, or gender identity.            Thank you!     Thank you for choosing [facility]  for your care. Our goal is always to provide you with excellent care. Hearing back from our patients is one way we can continue to improve our services. Please take a few minutes to complete the written survey that you may receive in the mail after your visit with us. Thank you!             Your Updated Medication List - Protect others around you: Learn how to safely use, store and throw away your medicines at www.disposemymeds.org.          This list is accurate as of 6/25/18  3:20 PM.  Always use your most recent med list.                   Brand Name Dispense Instructions for use Diagnosis    CENTRUM Tabs      1 TABLET DAILY        cetirizine 10 MG tablet    zyrTEC     Take 10 mg by mouth daily.        folic acid 800 MCG Tabs      Take  by mouth.        hydrOXYzine 25 MG capsule     VISTARIL    20 capsule    Take 1 capsule (25 mg) by mouth 3 times daily as needed for anxiety    Fear of travel with panic attacks       IRON SUPPLEMENT PO      Take  by mouth.        LORazepam 0.5 MG tablet    ATIVAN    20 tablet    Take 0.5-1 tablets (0.25-0.5 mg) by mouth every 8 hours as needed for anxiety Take 30 minutes prior to departure.  Do not operate a vehicle after taking this medication    Fear of travel with panic attacks       * losartan 50 MG tablet    COZAAR    30 tablet    Take 1 tablet (50 mg) by mouth daily    Hypertension goal BP (blood pressure) < 140/90       * losartan 100 MG tablet    COZAAR    30 tablet    Take 1 tablet (100 mg) by mouth daily    Hypertension goal BP (blood pressure) < 140/90       ondansetron 4 MG ODT tab    ZOFRAN ODT    20 tablet    Take 1-2 tablets (4-8 mg) by mouth every 8 hours as needed for nausea    Nausea       rizatriptan 5 MG tablet    MAXALT    18 tablet    Take 1-2 tablets (5-10 mg) by mouth at onset of headache for migraine May repeat dose in 2 hours.  Do not exceed 30 mg in 24 hours    Migraine with aura and without status migrainosus, not intractable       scopolamine 72 hr patch    TRANSDERM    6 patch    Place 1 patch onto the skin every 72 hours.  Apply to hairless area behind one ear at least 4 hours before travel.  Remove old patch and change every 3 days.    Motion sickness, initial encounter       vitamin D 1000 units capsule      Take 1 capsule by mouth daily.        * Notice:  This list has 2 medication(s) that are the same as other medications prescribed for you. Read the directions carefully, and ask your doctor or other care provider to review them with you.

## 2018-06-25 NOTE — NURSING NOTE
Prior to injection verified patient identity using patient's name and date of birth.  Due to injection administration, patient instructed to remain in clinic for 15 minutes  afterwards, and to report any adverse reaction to me immediately.    Screening Questionnaire for Adult Immunization    Are you sick today?   No   Do you have allergies to medications, food, a vaccine component or latex?   Yes   Have you ever had a serious reaction after receiving a vaccination?   No   Do you have a long-term health problem with heart disease, lung disease, asthma, kidney disease, metabolic disease (e.g. diabetes), anemia, or other blood disorder?   No   Do you have cancer, leukemia, HIV/AIDS, or any other immune system problem?   No   In the past 3 months, have you taken medications that affect  your immune system, such as prednisone, other steroids, or anticancer drugs; drugs for the treatment of rheumatoid arthritis, Crohn s disease, or psoriasis; or have you had radiation treatments?   No   Have you had a seizure, or a brain or other nervous system problem?   No   During the past year, have you received a transfusion of blood or blood     products, or been given immune (gamma) globulin or antiviral drug?   No   For women: Are you pregnant or is there a chance you could become        pregnant during the next month?   No   Have you received any vaccinations in the past 4 weeks?   No     Immunization questionnaire was positive for at least one answer.  Patient stated that she got the ok from her allergist to get the Tdap.        Per orders of Dr. Augustin, injection of Adacel given by Suzanne Navarrete. Patient instructed to remain in clinic for 15 minutes afterwards, and to report any adverse reaction to me immediately.       Screening performed by Suzanne Navarrete on 6/25/2018 at 3:31 PM.

## 2018-06-25 NOTE — PROGRESS NOTES
"  SUBJECTIVE:   Margie Becker is a 48 year old female who presents to clinic today for the following health issues:        Patient also did see an allergist regarding TDAP vaccines (see appointment info dated 10/23/17).  Patient is due for TDAP today if ok by provider due to allergy info.  Might need to update Allergies list also.    Hypertension Follow-up      Outpatient blood pressures are being checked at home.  Results are 130s/80s.    Low Salt Diet: \"trying\"    Cozaar 50 mg     She feels that if she sits for a while, her BP should come down.       Flight Anxiety:  She travels often for work. She has been working to manage her medications for flying. The patches seem to stay in her system too long. She took a zofran before leaving, and ativan helped.       Amount of exercise or physical activity: about 4 days per week.    Problems taking medications regularly: No    Medication side effects: none    Diet: regular (no restrictions)            Problem list and histories reviewed & adjusted, as indicated.  Additional history: as documented    BP Readings from Last 3 Encounters:   06/25/18 144/86   05/17/18 (!) 155/99   03/25/18 152/82    Wt Readings from Last 3 Encounters:   06/25/18 176 lb (79.8 kg)   05/17/18 175 lb (79.4 kg)   03/25/18 160 lb (72.6 kg)                    Reviewed and updated as needed this visit by clinical staff  Tobacco  Allergies  Meds  Med Hx  Surg Hx  Fam Hx  Soc Hx      Reviewed and updated as needed this visit by  Provider  Meds         ROS:  Constitutional, HEENT, cardiovascular, pulmonary, gi and gu systems are negative, except as otherwise noted.    This document serves as a record of the services and decisions personally performed by RICHARD GUZMAN. It was created on his/her behalf by Venita Sarabia, a trained medical scribe. The creation of this document is based on the provider's statements to the medical scribe. Venita Sarabia, June 25, 2018 2:59 PM    OBJECTIVE:     /86  " "Pulse 76  Temp 98.2  F (36.8  C) (Oral)  Ht 5' 5.98\" (1.676 m)  Wt 176 lb (79.8 kg)  SpO2 97%  BMI 28.42 kg/m2  Body mass index is 28.42 kg/(m^2).  GENERAL: healthy, alert and no distress  HENT: normal cephalic/atraumatic, oropharynx clear and oral mucous membranes moist  NECK: no adenopathy, no asymmetry, masses, or scars and thyroid normal to palpation  RESP: lungs clear to auscultation - no rales, rhonchi or wheezes  CV: regular rate and rhythm, normal S1 S2, no S3 or S4, no murmur, click or rub, no peripheral edema and peripheral pulses strong  MS: no gross musculoskeletal defects noted, no edema  PSYCH: mentation appears normal, affect normal/bright    Diagnostic Test Results:  No results found for this or any previous visit (from the past 24 hour(s)).    ASSESSMENT/PLAN:     Hypertension; slightly worsened   Associated with the following complications:    None   Plan:  Medications:     I increased the patient's Cozaar to 100 mg daily.         ICD-10-CM    1. Hypertension goal BP (blood pressure) < 140/90 I10 losartan (COZAAR) 100 MG tablet   2. Need for prophylactic vaccination with tetanus-diphtheria (TD) Z23 TDAP VACCINE (ADACEL)   3. Nausea R11.0 ondansetron (ZOFRAN ODT) 4 MG ODT tab       I increased the patient's cozaar to 100 mg daily to better control her HTN. I advised her to come to our pharmacy in about 2-3 weeks for a BP recheck. She will also check her BP at home. I refilled her zofran for travel anxiety. She will follow up with me in 6 months for HTN.     There are no Patient Instructions on file for this visit.    Cheryl Augustin,   [facility]    The information in this document, created by the medical scribabigail Sarabia for me, accurately reflects the services I personally performed and the decisions made by me. I have reviewed and approved this document for accuracy prior to leaving the patient care area.    "

## 2018-07-25 ENCOUNTER — ALLIED HEALTH/NURSE VISIT (OUTPATIENT)
Dept: NURSING | Facility: CLINIC | Age: 48
End: 2018-07-25
Payer: COMMERCIAL

## 2018-07-25 VITALS
DIASTOLIC BLOOD PRESSURE: 76 MMHG | HEIGHT: 65 IN | SYSTOLIC BLOOD PRESSURE: 128 MMHG | HEART RATE: 88 BPM | WEIGHT: 176 LBS | BODY MASS INDEX: 29.32 KG/M2

## 2018-07-25 DIAGNOSIS — Z79.899 DRUG DOSE CHANGED: Primary | ICD-10-CM

## 2018-07-25 DIAGNOSIS — I10 HYPERTENSION GOAL BP (BLOOD PRESSURE) < 140/90: ICD-10-CM

## 2018-07-25 PROCEDURE — 36415 COLL VENOUS BLD VENIPUNCTURE: CPT | Performed by: FAMILY MEDICINE

## 2018-07-25 PROCEDURE — 99207 ZZC NO CHARGE NURSE ONLY: CPT

## 2018-07-25 PROCEDURE — 80048 BASIC METABOLIC PNL TOTAL CA: CPT | Performed by: FAMILY MEDICINE

## 2018-07-25 NOTE — Clinical Note
Saw pt blood pressure 128/76 after you increased her dose. I did get bmp and she needs a refill if you intend to keep her on this dose. No side effects to report with the increase. Clarissa Mendiola Rn

## 2018-07-25 NOTE — PROGRESS NOTES
DATA: PCP: Dr Augustin  Indications for Blood Pressure (BP) monitoring: high while in clinic for last ov        ACTION: Signs and Symptoms:  Headaches?: yes unsure if it could be allergies.  Chest pain?: no  Shortness of breath?: no  Edema?: no  Visual problems?: no  Parathesia?: no  Epitaxis?: no  Dizziness?: no  Hematuria?: no    BP:   BP Readings from Last 1 Encounters:   06/25/18 144/86     Data Unavailable     Diabetic?: no  Heart Disease?: no  Smoking?: no  Alcohol usage?: occssional  Low sodium diet?: yes  Exercise?: yes  Checks blood pressure at home?:    *BP is 130/80 or greater for diabetics or heart disease? non  *BP is 140/90 or greater for non-diabetics? no  *Pulse under 60? no  *Pulse over 110? no    Discussed with patient symptoms of high blood pressure, best ways to measure blood pressure, how blood pressure affects health, risk factors for high blood pressure, and lifestyle changes to help prevent/control high blood pressure.        RESPONSE: Patient verbalizes understanding, denies questions or concerns, and agrees with plan.         PLAN: Patient left in ambulatory condition. Will call or follow-up in clinic during the interim as needed.        CC: Chart to Dr Charli Cloud,Clinic Rn  [place]

## 2018-07-25 NOTE — MR AVS SNAPSHOT
After Visit Summary   7/25/2018    Margie Becker    MRN: [id number]           Patient Information     Date Of Birth          1970        Visit Information        Provider Department      7/25/2018 3:00 PM NE RN [facility]        Today's Diagnoses     Drug dose changed    -  1      Care Instructions    Limiting your intake of salt (sodium) to 1,500-2,000mg per day will help lower your blood pressure.  One great way to do that is using spices for flavor and reading labels to spot hidden sodium in processed and canned foods.  For more information and some great recipe ideas you can visit MRS. DE SANTIAGO  salt free seasoning at http://www.[employer]/?s_cid=c3a:google:brand:mrs+dash  (She even has a FACEBOOK page).    We also talked about portion control and how adding more whole grains, fresh fruits and vegetables can help you lose weight which has a direct effect on lowering your blood pressure.  Try taking the  Portion Distortion Quiz  at http://zi9500.nhlbihin.net/portion/portion.cgi?action=question&number=1 it s only 11 questions.    Think of meat as a side dish rather than the main course by loading up on veggies and healthy grains.  You ll feel full sooner and longer.  This is a great opportunity to try new vegetables and preparation methods.  Have fun with this!  Stress and how by itself has not been proved to cause long-term high blood pressure, other behaviors linked to stress - such as overeating, drinking alcohol and poor sleep habits - can cause high blood pressure.      Stressful events are a fact of life and you may not be able to change your current situation.  Identifying what stresses you out and how you respond to that stress is the key to learning how you can take care of yourself physically and emotionally.  Check out the American Heart Association for more useful information at...   http://www.americanheart.org/presenter.jhtml?nyedhfhbat=6832700    Eating a  healthy diet, getting some exercise, using relaxation techniques, proper sleep, and counseling are ways to help decrease stress in your life.  Be good to yourself!  Limiting your intake of salt (sodium) to 1,500-2,000mg per day will help lower your blood pressure.  One great way to do that is using spices for flavor and reading labels to spot hidden sodium in processed and canned foods.  For more information and some great recipe ideas you can visit MRS. DE SANTIAGO  salt free seasonAltai Technologies at http://www.[employer]/?s_cid=c3a:google:brand:mrs+dash  (She even has a FACEBOOK page).    We also talked about portion control and how adding more whole grains, fresh fruits and vegetables can help you lose weight which has a direct effect on lowering your blood pressure.  Try taking the  Portion Distortion Quiz  at http://do3605.nhlbihin.net/portion/portion.cgi?action=question&number=1 it s only 11 questions.    Think of meat as a side dish rather than the main course by loading up on veggies and healthy grains.  You ll feel full sooner and longer.  This is a great opportunity to try new vegetables and preparation methods.  Have fun with this!            Follow-ups after your visit        Who to contact     If you have questions or need follow up information about today's clinic visit or your schedule please contact [facility] directly at [phone].  Normal or non-critical lab and imaging results will be communicated to you by MyChart, letter or phone within 4 business days after the clinic has received the results. If you do not hear from us within 7 days, please contact the clinic through MyChart or phone. If you have a critical or abnormal lab result, we will notify you by phone as soon as possible.  Submit refill requests through [employer] or call your pharmacy and they will forward the refill request to us. Please allow 3 business days for your refill to be completed.          Additional Information About Your  "Visit        MyChart Information     [employer] gives you secure access to your electronic health record. If you see a primary care provider, you can also send messages to your care team and make appointments. If you have questions, please call your primary care clinic.  If you do not have a primary care provider, please call [phone] and they will assist you.        Care EveryWhere ID     This is your Care EveryWhere ID. This could be used by other organizations to access your [place] medical records  [id number]        Your Vitals Were     Pulse Height                88 5' 5\" (1.651 m)           Blood Pressure from Last 3 Encounters:   07/25/18 128/76   06/25/18 144/86   05/17/18 (!) 155/99    Weight from Last 3 Encounters:   06/25/18 176 lb (79.8 kg)   05/17/18 175 lb (79.4 kg)   03/25/18 160 lb (72.6 kg)              We Performed the Following     Basic metabolic panel  (Ca, Cl, CO2, Creat, Gluc, K, Na, BUN)        Primary Care Provider Office Phone # Fax #    Cheryl Augustin  [phone]       [address] [facility] [address]        Equal Access to Services     YI ROSA AH: Hadii aad ku hadasho Soomaali, waaxda luqadaha, qaybta kaalmada adeegyada, giulia dineron georgie sharma. So [facility] [phone].    ATENCIÓN: Si habla español, tiene a guzman disposición servicios gratuitos de asistencia lingüística. Rachel al [phone].    We comply with applicable federal civil rights laws and [place] laws. We do not discriminate on the basis of race, color, national origin, age, disability, sex, sexual orientation, or gender identity.            Thank you!     Thank you for choosing [facility]  for your care. Our goal is always to provide you with excellent care. Hearing back from our patients is one way we can continue to improve our services. Please take a few minutes to complete the written survey that you may receive in the mail after your visit with us. Thank " you!             Your Updated Medication List - Protect others around you: Learn how to safely use, store and throw away your medicines at www.disposemymeds.org.          This list is accurate as of 7/25/18  3:11 PM.  Always use your most recent med list.                   Brand Name Dispense Instructions for use Diagnosis    CENTRUM Tabs      1 TABLET DAILY        cetirizine 10 MG tablet    zyrTEC     Take 10 mg by mouth daily.        folic acid 800 MCG Tabs      Take  by mouth.        hydrOXYzine 25 MG capsule    VISTARIL    20 capsule    Take 1 capsule (25 mg) by mouth 3 times daily as needed for anxiety    Fear of travel with panic attacks       IRON SUPPLEMENT PO      Take  by mouth.        LORazepam 0.5 MG tablet    ATIVAN    20 tablet    Take 0.5-1 tablets (0.25-0.5 mg) by mouth every 8 hours as needed for anxiety Take 30 minutes prior to departure.  Do not operate a vehicle after taking this medication    Fear of travel with panic attacks       * losartan 50 MG tablet    COZAAR    30 tablet    Take 1 tablet (50 mg) by mouth daily    Hypertension goal BP (blood pressure) < 140/90       * losartan 100 MG tablet    COZAAR    30 tablet    Take 1 tablet (100 mg) by mouth daily    Hypertension goal BP (blood pressure) < 140/90       ondansetron 4 MG ODT tab    ZOFRAN ODT    20 tablet    Take 1-2 tablets (4-8 mg) by mouth every 8 hours as needed for nausea    Nausea       rizatriptan 5 MG tablet    MAXALT    18 tablet    Take 1-2 tablets (5-10 mg) by mouth at onset of headache for migraine May repeat dose in 2 hours.  Do not exceed 30 mg in 24 hours    Migraine with aura and without status migrainosus, not intractable       scopolamine 72 hr patch    TRANSDERM    6 patch    Place 1 patch onto the skin every 72 hours.  Apply to hairless area behind one ear at least 4 hours before travel.  Remove old patch and change every 3 days.    Motion sickness, initial encounter       vitamin D 1000 units capsule      Take 1  capsule by mouth daily.        * Notice:  This list has 2 medication(s) that are the same as other medications prescribed for you. Read the directions carefully, and ask your doctor or other care provider to review them with you.

## 2018-07-25 NOTE — PATIENT INSTRUCTIONS
Limiting your intake of salt (sodium) to 1,500-2,000mg per day will help lower your blood pressure.  One great way to do that is using spices for flavor and reading labels to spot hidden sodium in processed and canned foods.  For more information and some great recipe ideas you can visit MRS. DE SANTIAGO  salt free seasoning at http://www.[employer]/?s_cid=c3a:google:brand:mrs+dash  (She even has a FACEBOOK page).    We also talked about portion control and how adding more whole grains, fresh fruits and vegetables can help you lose weight which has a direct effect on lowering your blood pressure.  Try taking the  Portion Distortion Quiz  at http://ng5345.nhlbihin.net/portion/portion.cgi?action=question&number=1 it s only 11 questions.    Think of meat as a side dish rather than the main course by loading up on veggies and healthy grains.  You ll feel full sooner and longer.  This is a great opportunity to try new vegetables and preparation methods.  Have fun with this!  Stress and how by itself has not been proved to cause long-term high blood pressure, other behaviors linked to stress - such as overeating, drinking alcohol and poor sleep habits - can cause high blood pressure.      Stressful events are a fact of life and you may not be able to change your current situation.  Identifying what stresses you out and how you respond to that stress is the key to learning how you can take care of yourself physically and emotionally.  Check out the American Heart Association for more useful information at...   http://www.americanheart.org/presenter.jhtml?blgiwyjccv=9412621    Eating a healthy diet, getting some exercise, using relaxation techniques, proper sleep, and counseling are ways to help decrease stress in your life.  Be good to yourself!  Limiting your intake of salt (sodium) to 1,500-2,000mg per day will help lower your blood pressure.  One great way to do that is using spices for flavor and reading labels to spot  hidden sodium in processed and canned foods.  For more information and some great recipe ideas you can visit MRS. DE SANTIAGO  salt free seasoning at http://www.im3D/?s_cid=c3a:google:brand:mrs+dash  (She even has a FACEBOOK page).    We also talked about portion control and how adding more whole grains, fresh fruits and vegetables can help you lose weight which has a direct effect on lowering your blood pressure.  Try taking the  Portion Distortion Quiz  at http://gq4224.nhlbihin.net/portion/portion.cgi?action=question&number=1 it s only 11 questions.    Think of meat as a side dish rather than the main course by loading up on veggies and healthy grains.  You ll feel full sooner and longer.  This is a great opportunity to try new vegetables and preparation methods.  Have fun with this!

## 2018-07-26 LAB
ANION GAP SERPL CALCULATED.3IONS-SCNC: 8 MMOL/L (ref 3–14)
BUN SERPL-MCNC: 9 MG/DL (ref 7–30)
CALCIUM SERPL-MCNC: 8.3 MG/DL (ref 8.5–10.1)
CHLORIDE SERPL-SCNC: 105 MMOL/L (ref 94–109)
CO2 SERPL-SCNC: 23 MMOL/L (ref 20–32)
CREAT SERPL-MCNC: 0.88 MG/DL (ref 0.52–1.04)
GFR SERPL CREATININE-BSD FRML MDRD: 69 ML/MIN/1.7M2
GLUCOSE SERPL-MCNC: 79 MG/DL (ref 70–99)
POTASSIUM SERPL-SCNC: 4.2 MMOL/L (ref 3.4–5.3)
SODIUM SERPL-SCNC: 136 MMOL/L (ref 133–144)

## 2018-07-26 RX ORDER — LOSARTAN POTASSIUM 100 MG/1
100 TABLET ORAL DAILY
Qty: 90 TABLET | Refills: 1 | Status: SHIPPED | OUTPATIENT
Start: 2018-07-26 | End: 2018-12-20

## 2018-11-30 DIAGNOSIS — F40.01 FEAR OF TRAVEL WITH PANIC ATTACKS: ICD-10-CM

## 2018-11-30 NOTE — LETTER
[facility]  [address] [place] [id number]  Phone: [phone]        December 19, 2018      Margie Becker                                                                                                            [facility] [facility] [address]          Kris Hanson,        We have attempted to reach you regarding your refill request, but have been unsuccessful.    We have received a refill request for one of your medications. We have sent a refill of your medication to your pharmacy, however your provider has noted that you will need to be seen for a follow up visit in order to continue this medication.    Please contact us at [phone] to schedule an appointment with your provider before your next refill is due.      Thank you,      Your Health Care Team at the [facility]

## 2018-11-30 NOTE — TELEPHONE ENCOUNTER
LORazepam (ATIVAN) 0.5 MG tablet      Last Written Prescription Date:  4/3/2018  Last Fill Quantity: 20 tablet,   # refills: 1  Last Office Visit: 6/25/2018  jose/ SHANE Augustin  Prescribing Provider's NPI: [id number]  Jordan Patterson MD    Future Office visit:       Routing refill request to provider for review/approval because:  Drug not on the [facility], [facility] or [facility] refill protocol or controlled substance

## 2018-12-04 RX ORDER — LORAZEPAM 0.5 MG/1
0.25-0.5 TABLET ORAL EVERY 8 HOURS PRN
Qty: 20 TABLET | Refills: 1 | Status: SHIPPED | OUTPATIENT
Start: 2018-12-04 | End: 2019-03-12

## 2018-12-04 NOTE — TELEPHONE ENCOUNTER
She is almost due for htn check up.  Will do refill but needs HTN appointment  In a few weeks.     Cheryl Augustin D.O.

## 2018-12-05 NOTE — TELEPHONE ENCOUNTER
RX walked to pharmacy next door. Routing encounter to team silver and flagging to schedule follow up.    Thanks!  Mitch Light

## 2018-12-06 NOTE — TELEPHONE ENCOUNTER
Called patient home number and left a VM message that she is due for a follow up HTN appointment.    Nicole Quiroga

## 2018-12-20 ENCOUNTER — MYC MEDICAL ADVICE (OUTPATIENT)
Dept: FAMILY MEDICINE | Facility: CLINIC | Age: 48
End: 2018-12-20

## 2018-12-20 ENCOUNTER — MYC REFILL (OUTPATIENT)
Dept: FAMILY MEDICINE | Facility: CLINIC | Age: 48
End: 2018-12-20

## 2018-12-20 DIAGNOSIS — I10 HYPERTENSION GOAL BP (BLOOD PRESSURE) < 140/90: ICD-10-CM

## 2018-12-20 RX ORDER — LOSARTAN POTASSIUM 100 MG/1
100 TABLET ORAL DAILY
Qty: 30 TABLET | Refills: 0 | Status: SHIPPED | OUTPATIENT
Start: 2018-12-20 | End: 2019-01-11

## 2018-12-20 NOTE — TELEPHONE ENCOUNTER
"LOV- 6/25. Maris to 1/4 appt.  Shona Charles, RN    Requested Prescriptions   Pending Prescriptions Disp Refills     losartan (COZAAR) 100 MG tablet 90 tablet 1     Sig: Take 1 tablet (100 mg) by mouth daily    Angiotensin-II Receptors Passed - 12/20/2018  6:35 AM       Passed - Blood pressure under 140/90 in past 12 months    BP Readings from Last 3 Encounters:   07/25/18 128/76   06/25/18 144/86   05/17/18 (!) 155/99                Passed - Recent (12 mo) or future (30 days) visit within the authorizing provider's specialty    Patient had office visit in the last 12 months or has a visit in the next 30 days with authorizing provider or within the authorizing provider's specialty.  See \"Patient Info\" tab in inbasket, or \"Choose Columns\" in Meds & Orders section of the refill encounter.             Passed - Patient is age 18 or older       Passed - No active pregnancy on record       Passed - Normal serum creatinine on file in past 12 months    Recent Labs   Lab Test 07/25/18  1514   CR 0.88            Passed - Normal serum potassium on file in past 12 months    Recent Labs   Lab Test 07/25/18  1514   POTASSIUM 4.2                   Passed - No positive pregnancy test in past 12 months          "

## 2019-01-04 DIAGNOSIS — R11.0 NAUSEA: ICD-10-CM

## 2019-01-04 RX ORDER — ONDANSETRON 4 MG/1
TABLET, ORALLY DISINTEGRATING ORAL
Qty: 20 TABLET | Refills: 0 | Status: SHIPPED | OUTPATIENT
Start: 2019-01-04 | End: 2019-03-12

## 2019-01-04 NOTE — TELEPHONE ENCOUNTER
"Requested Prescriptions   Pending Prescriptions Disp Refills     ondansetron (ZOFRAN-ODT) 4 MG ODT tab [Pharmacy Med Name: ONDANSETRON 4MG TBDP]  Last Written Prescription Date:  6/25/2018  Last Fill Quantity: 20 tablet,  # refills: 1   Last office visit: 6/25/2018 with prescribing provider:  SHANE Augustin   Future Office Visit:   Next 5 appointments (look out 90 days)    Jan 11, 2019  2:00 PM CST  MyChart Long with Cheryl Augustin DO  [facility] ([address] [id number] [phone]       20 tablet 1     Sig: DISSOLVE ONE TO TWO TABLETS ON TONGUE EVERY 8 HOURS AS NEEDED FOR NAUSEA     Antivertigo/Antiemetic Agents Passed - 1/4/2019 12:12 PM       Passed - Recent (12 mo) or future (30 days) visit within the authorizing provider's specialty    Patient had office visit in the last 12 months or has a visit in the next 30 days with authorizing provider or within the authorizing provider's specialty.  See \"Patient Info\" tab in inbasket, or \"Choose Columns\" in Meds & Orders section of the refill encounter.         Passed - Patient is 18 years of age or older          "

## 2019-01-04 NOTE — TELEPHONE ENCOUNTER
Prescription approved per [facility] Refill Protocol.  Shona Charles RN

## 2019-01-11 ENCOUNTER — OFFICE VISIT (OUTPATIENT)
Dept: FAMILY MEDICINE | Facility: CLINIC | Age: 49
End: 2019-01-11
Payer: COMMERCIAL

## 2019-01-11 VITALS
BODY MASS INDEX: 30.99 KG/M2 | WEIGHT: 186 LBS | DIASTOLIC BLOOD PRESSURE: 84 MMHG | SYSTOLIC BLOOD PRESSURE: 122 MMHG | HEART RATE: 88 BPM | HEIGHT: 65 IN | TEMPERATURE: 98.4 F

## 2019-01-11 DIAGNOSIS — E66.811 CLASS 1 OBESITY WITHOUT SERIOUS COMORBIDITY WITH BODY MASS INDEX (BMI) OF 31.0 TO 31.9 IN ADULT, UNSPECIFIED OBESITY TYPE: ICD-10-CM

## 2019-01-11 DIAGNOSIS — Z23 NEED FOR PROPHYLACTIC VACCINATION AND INOCULATION AGAINST INFLUENZA: ICD-10-CM

## 2019-01-11 DIAGNOSIS — Z12.31 ENCOUNTER FOR SCREENING MAMMOGRAM FOR BREAST CANCER: ICD-10-CM

## 2019-01-11 DIAGNOSIS — I10 HYPERTENSION GOAL BP (BLOOD PRESSURE) < 140/90: Primary | ICD-10-CM

## 2019-01-11 DIAGNOSIS — F40.01 FEAR OF TRAVEL WITH PANIC ATTACKS: ICD-10-CM

## 2019-01-11 PROCEDURE — 90471 IMMUNIZATION ADMIN: CPT | Performed by: FAMILY MEDICINE

## 2019-01-11 PROCEDURE — 36415 COLL VENOUS BLD VENIPUNCTURE: CPT | Performed by: FAMILY MEDICINE

## 2019-01-11 PROCEDURE — 90686 IIV4 VACC NO PRSV 0.5 ML IM: CPT | Performed by: FAMILY MEDICINE

## 2019-01-11 PROCEDURE — 80048 BASIC METABOLIC PNL TOTAL CA: CPT | Performed by: FAMILY MEDICINE

## 2019-01-11 PROCEDURE — 84443 ASSAY THYROID STIM HORMONE: CPT | Performed by: FAMILY MEDICINE

## 2019-01-11 PROCEDURE — 99214 OFFICE O/P EST MOD 30 MIN: CPT | Mod: 25 | Performed by: FAMILY MEDICINE

## 2019-01-11 RX ORDER — HYDROXYZINE PAMOATE 25 MG/1
25 CAPSULE ORAL 3 TIMES DAILY PRN
Qty: 20 CAPSULE | Refills: 1 | Status: SHIPPED | OUTPATIENT
Start: 2019-01-11 | End: 2020-05-11

## 2019-01-11 RX ORDER — LOSARTAN POTASSIUM 100 MG/1
100 TABLET ORAL DAILY
Qty: 90 TABLET | Refills: 1 | Status: SHIPPED | OUTPATIENT
Start: 2019-01-11 | End: 2019-08-01

## 2019-01-11 ASSESSMENT — MIFFLIN-ST. JEOR: SCORE: 1474.57

## 2019-01-11 NOTE — PROGRESS NOTES
SUBJECTIVE:   Margie Becker is a 48 year old female who presents to clinic today for the following health issues:      Hypertension Follow-up      Outpatient blood pressures are being checked at home.  Results are 120-140's/80's.    Low Salt Diet: no added salt    Losartan 100 mg daily    Fear of Travel:  Patient reports that the scopolamine made her too drowsy and caused dry mouth, but she has not needed to take this for quite some time. Zofran seems to help with nausea. She takes vistaril for shorter flights, and Ativan for longer flights when she needs it.     Weight Management:  Patient reports that she has not been exercising much lately. She states that she often comes home from work feeling tired, and she will sit on the couch and eat. She has fluctuated about 40 lbs in the last few years. She would like to increase her exercise to lose some weight. She has tried weight watchers in the past, and feels that some accountability would help her.       Amount of exercise or physical activity: 2-3 days/week for an average of 30-45 minutes    Problems taking medications regularly: No    Medication side effects: none    Diet: low salt        Problem list and histories reviewed & adjusted, as indicated.  Additional history: as documented    BP Readings from Last 3 Encounters:   01/11/19 122/84   07/25/18 128/76   06/25/18 144/86    Wt Readings from Last 3 Encounters:   01/11/19 84.4 kg (186 lb)   07/25/18 79.8 kg (176 lb)   06/25/18 79.8 kg (176 lb)                    Reviewed and updated as needed this visit by clinical staff  Tobacco  Allergies  Meds  Med Hx  Surg Hx  Fam Hx  Soc Hx      Reviewed and updated as needed this visit by Provider         ROS:  Constitutional, HEENT, cardiovascular, pulmonary, gi and gu systems are negative, except as otherwise noted.    This document serves as a record of the services and decisions personally performed by RICHARD GUZMAN. It was created on his/her behalf by  "Venita Sarabia, a trained medical scribe. The creation of this document is based on the provider's statements to the medical scribe. Veinta Sarabia, January 11, 2019 2:38 PM    OBJECTIVE:     /84 (Cuff Size: Adult Large)   Pulse 88   Temp 98.4  F (36.9  C) (Oral)   Ht 1.651 m (5' 5\")   Wt 84.4 kg (186 lb)   BMI 30.95 kg/m    Body mass index is 30.95 kg/m .  GENERAL: healthy, alert and no distress  HENT: ear canals and TM's normal, nose and mouth without ulcers or lesions  NECK: no adenopathy, no asymmetry, masses, or scars and thyroid normal to palpation  RESP: lungs clear to auscultation - no rales, rhonchi or wheezes  CV: regular rate and rhythm, normal S1 S2, no S3 or S4, no murmur, click or rub, no peripheral edema and peripheral pulses strong  PSYCH: mentation appears normal, affect normal/bright    Diagnostic Test Results:  No results found for this or any previous visit (from the past 24 hour(s)).    ASSESSMENT/PLAN:     Hypertension; controlled   Associated with the following complications:    None   Plan:  No changes in the patient's current treatment plan        ICD-10-CM    1. Need for prophylactic vaccination and inoculation against influenza Z23 FLU VACCINE, SPLIT VIRUS, IM (QUADRIVALENT) [51577]- >3 YRS     Vaccine Administration, Initial [40303]   2. 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 Hypertension goal BP (blood pressure) < 140/90 I10 losartan (COZAAR) 100 MG tablet     Basic metabolic panel  (Ca, Cl, CO2, Creat, Gluc, K, Na, BUN)   4. Class 1 obesity without serious comorbidity with body mass index (BMI) of 31.0 to 31.9 in adult, unspecified obesity type E66.9 BARIATRIC ADULT REFERRAL    Z68.31 TSH with free T4 reflex   5. Encounter for screening mammogram for breast cancer Z12.31 MA Screen Bilateral w/Wenceslao       I refilled the patient's losartan as it is working well to control her HTN. I also referred the patient to weight management for " participating in a weight loss program. I refilled her vistaril as she uses this for her fear of travel. She travels for work. Patient will follow up with me in 6 months for HTN.     Patient Instructions   Follow up in 6 months for hypertension.     If you can, it is helpful if you fill out a survey about your clinic visit if it is mailed to your house in a few weeks.      Cheryl Augustin, DO  [facility]    The information in this document, created by the medical scribe Venita Sarabia for me, accurately reflects the services I personally performed and the decisions made by me. I have reviewed and approved this document for accuracy prior to leaving the patient care area.    Injectable Influenza Immunization Documentation    1.  Is the person to be vaccinated sick today?   No    2. Does the person to be vaccinated have an allergy to a component   of the vaccine?   No  Egg Allergy Algorithm Link    3. Has the person to be vaccinated ever had a serious reaction   to influenza vaccine in the past?   No    4. Has the person to be vaccinated ever had Guillain-Barré syndrome?   No    Form completed by Suzanne Navarrete, Certified Medical Assistant (AAMA)

## 2019-01-11 NOTE — PATIENT INSTRUCTIONS
Follow up in 6 months for hypertension.     If you can, it is helpful if you fill out a survey about your clinic visit if it is mailed to your house in a few weeks.

## 2019-01-12 LAB
ANION GAP SERPL CALCULATED.3IONS-SCNC: 7 MMOL/L (ref 3–14)
BUN SERPL-MCNC: 10 MG/DL (ref 7–30)
CALCIUM SERPL-MCNC: 8.8 MG/DL (ref 8.5–10.1)
CHLORIDE SERPL-SCNC: 105 MMOL/L (ref 94–109)
CO2 SERPL-SCNC: 24 MMOL/L (ref 20–32)
CREAT SERPL-MCNC: 0.88 MG/DL (ref 0.52–1.04)
GFR SERPL CREATININE-BSD FRML MDRD: 78 ML/MIN/{1.73_M2}
GLUCOSE SERPL-MCNC: 80 MG/DL (ref 70–99)
POTASSIUM SERPL-SCNC: 4.1 MMOL/L (ref 3.4–5.3)
SODIUM SERPL-SCNC: 136 MMOL/L (ref 133–144)
TSH SERPL DL<=0.005 MIU/L-ACNC: 2.14 MU/L (ref 0.4–4)

## 2019-02-12 ENCOUNTER — DOCUMENTATION ONLY (OUTPATIENT)
Dept: CARE COORDINATION | Facility: CLINIC | Age: 49
End: 2019-02-12

## 2019-02-15 ENCOUNTER — HEALTH MAINTENANCE LETTER (OUTPATIENT)
Age: 49
End: 2019-02-15

## 2019-02-21 ENCOUNTER — TELEPHONE (OUTPATIENT)
Dept: ENDOCRINOLOGY | Facility: CLINIC | Age: 49
End: 2019-02-21

## 2019-02-21 NOTE — TELEPHONE ENCOUNTER
"Patient scheduled for 24 week healthy lifestyle plan.    Name: Margie Becker      Ht: 5' 5\"    Wt: 186lbs    BMI: 30.95    Co-morbidities: Hypertension      Did patient receive 24 week welcome letter: Unknown  (if patient did not receive will send via My Chart)    Scheduled to see CNP or BABAR for New 24 week provider visit on Thursday 2/28/19 at 2 pm.    [employer] or [employer] [facility] employee: Unknown    Any questions about how the plan works: Unknown     -$499 on day of consult, can use HSA card OR $99 for employees     -Meal replacements additional cost CANNOT use HSA $    -Check with insurance regarding coverage for medical weight management provider visit, dietitian and labs.    Spoke to patient: No   Left message: Yes, regarding appointment for    Instructed to complete pre-visit questionnaires on My Chart or come in 30 min prior to appointment time.    [phone] Contact Center phone number      Mis Leonard          "

## 2019-02-28 ENCOUNTER — OFFICE VISIT (OUTPATIENT)
Dept: ENDOCRINOLOGY | Facility: CLINIC | Age: 49
End: 2019-02-28
Payer: COMMERCIAL

## 2019-02-28 VITALS
DIASTOLIC BLOOD PRESSURE: 87 MMHG | BODY MASS INDEX: 31.04 KG/M2 | SYSTOLIC BLOOD PRESSURE: 153 MMHG | HEIGHT: 65 IN | HEART RATE: 85 BPM | OXYGEN SATURATION: 97 % | WEIGHT: 186.3 LBS

## 2019-02-28 DIAGNOSIS — E66.811 OBESITY, CLASS I, BMI 30-34.9: Primary | ICD-10-CM

## 2019-02-28 RX ORDER — NALTREXONE HYDROCHLORIDE 50 MG/1
TABLET, FILM COATED ORAL
Qty: 30 TABLET | Refills: 1 | Status: SHIPPED | OUTPATIENT
Start: 2019-02-28 | End: 2019-08-30

## 2019-02-28 ASSESSMENT — MIFFLIN-ST. JEOR: SCORE: 1479.89

## 2019-02-28 NOTE — PATIENT INSTRUCTIONS
Welcome to our weight management program!   We are excited to join you on your weight loss journey    Thank you for allowing us the privilege of caring for you. We hope we provided you with the excellent service you deserve.    Please let us know if there is anything else we can do so that we can be sure you are leaving completely satisfied with your care experience.    You saw RICHARD Owusu CNP  today.    Instructions per today's visit:   Medications started today: naltrexone  Follow up with Health  and Dietician 3/6/19   Start looking at journal    To reach DALE Aquino for any questions/concerns call [phone]    To schedule appointments with our team, please call [phone]     Please call during clinic hours Monday through Friday 8:00a - 4:00p if you have questions or you can contact us via [employer] at anytime. ?    Lab results will be communicated through My Chart or letter (if My Chart not used). Please call the clinic if you have not received communication after 1 week or if you have any questions.?      Fax: [phone]    Thank you,  Medical Weight Management Team       MEDICATION STARTED AT THIS APPOINTMENT  We are starting Naltrexone. Start with 1/2 tab 1-2 hours prior to the time you have the most trouble with cravings or extra hunger. If you are doing well you may switch to a whole tablet taken at the same time period.     WARNING: This medication blocks the action of opioid type pain medications. If you routinely take any medication like Codeine, Oxycontin,Percocet,Morphine,Dilaudid or Methodone, do not take this until you have talked with weight management staff. If you are planning surgery you should stop Naltrexone 4 days prior to the surgery. If you have an injury that requires pain medication, make sure the health care staff knows you take Naltrexone.     Call the nurse at [phone] if you have any questions or concerns. (Do not stop taking it if you don't think it's working. For  "some people it works without them knowing it.)    Naltrexone is a medication that is used most often to help people who are troubled by dependence on prescription pain killers or alcohol. It has also been found to help with weight loss. Although it's not currently FDA approved for weight loss, it has been used safely for a number of years to help people who are carrying extra weight.     Just how Naltrexone helps with weight loss has not been exactly determined.  It seems to work by quieting down brain signals related to strong food cravings. Many of our patients use the word \"addiction\" to describe their feelings and constant thoughts about food. It makes sense then to treat the feeling of dependence on food, outside of real hunger, with a medication designed to help with other sorts of dependence.     Our patients on Naltrexone find that they:    >feel less interest in food   >think less about food and eating and have more time to think of other things   >find it easier to push the plate away   >have an easier time eating less    For some of our patients, these feelings are very immediate. Other patients, don't feel much of a change but find they've lost weight. Like all weight loss medications, Naltrexone works best when you help it work. This means:  1. Having less tempting high calorie (fattening) food around the house or office. (For people with strong cravings this is very important.)   2. Staying away from situations or people that may trigger your cravings .   3. Eating out only one time or less each week.  4. Eating your meals at a table with the TV or computer off.    Side-effects. Naltrexone is generally well tolerated. The main side-effect we see is  nausea or a woozy feeling. A small number of people feel quite ill. Most people have a mild reaction and some people have no reaction at all.  The good news is that this feeling does go away.     In order to avoid nausea, please start the medication with " half a pill for the first few days. Go on to a full pill if you are feeling well.      If you  are nauseated on 1/2 a pill it is okay to cut back to 1/4 pill ( a very small amount). Take this for a couple of days and work your way back up to a 1/2 pill and then a whole pill. Taking the medication at night or with food  to start also may help prevent the feeling of nausea.         Please refer to the pharmacy insert for more information on side-effects. Since many pharmacists are not familiar with the use of naltrexone in weight loss, calling the nurse at [phone] will get you the most accurate information.  In order to get refills of this or any medication we prescribe you must be seen in the medical weight mgmt clinic every 2-3 months. Please have your pharmacy fax a refill request to [phone].

## 2019-02-28 NOTE — PROGRESS NOTES
"    New Medical Weight Management Consult    PATIENT:  Margie Becker  MRN:         [id number]  :         1970  ADIEL:         2019    Dear Cheryl Augustin,    I had the pleasure of seeing your patient, Margie Becker.  Full intake/assessment done to determine barriers to weight loss success and develop a treatment plan.  Margie Becker is a 48 year old female interested in treatment of medical problems associated with weight.  Her weight today is 186 lbs 4.8 oz, Body mass index is 30.76 kg/m ., and she has the following co-morbidities:     2019   I have the following co-morbidities associated with obesity: None of the above     HTN    Patient Goals Reviewed With Patient 2019   I am interested in attaining a healthier weight to diminish current health problems related to co-morbid conditions: No   I am interested in attaining a healthier weight in order to prevent future health problems: Yes     Change lifestyle, get back to healthy bmi    Referring Provider 2019   Please name the provider who referred you to Medical Weight Management.  If you do not know, please answer: \"I Don't Know\". Cheryl Augustin, DO       Wt Readings from Last 4 Encounters:   19 84.5 kg (186 lb 4.8 oz)   19 84.4 kg (186 lb)   18 79.8 kg (176 lb)   18 79.8 kg (176 lb)       Weight History Reviewed With Patient 2019   How concerned are you about your weight? Somewhat Concerned   Would you describe your weight gain as gradual? Yes   I became overweight: As an Adult   The following factors have contributed to my weight gain:  Eating Wrong Types of Food, Eating Too Much, Lack of Exercise, Genetic (Runs in the Family)   I have tried the following methods to lose weight: Weight Watchers   I have the following family history of obesity/being overweight:  One or more of my siblings are overweight   Has anyone in your family had weight loss surgery? No     Highest weight ever is right " now-   Gained weight with pregnancy with twins 6 years ago.   110lb in early 20s  2014 1 year after pregnancy, 148lbs    Weight watchers pre-pregnancy    Has thought about weight loss a lot in the last two years but not acted on it    Lactose intolerance    Good control at work.    Snacks a lot in the evenings. In front of the tv      Diet Recall Reviewed With Patient 2/25/2019   How many glasses of juice do you drink in a typical day? 0   How many of glasses of milk do you drink in a typical day? 0   How many 8oz glasses of sugar containing drinks such as Andrea-Aid/sweet tea do you drink in a day? 0   How many cans/bottles of sugar pop/soda/tea/sports drinks do you drink in a day? 0   How many cans/bottles of diet pop/soda/tea or sports drink do you drink in a day? 2   How often do you have a drink of alcohol? 2-3 TImes a Week   If you do drink, how many drinks might you have in a day? 1 or 2   diet soda  Alcohol On the weekends    Eating Habits Reviewed With Patient 2/25/2019   Generally, my meals include foods like these: bread, pasta, rice, potatoes, corn, crackers, sweet dessert, pop, or juice. Almost Everyday   Generally, my meals include foods like these: fried meats, brats, burgers, french fries, pizza, cheese, chips, or ice cream. Half of the Week   Eat fast food (like McDonalds, [employer], Taco Bell). A Few Times a Week   Eat at a buffet or sit-down restaurant. A Few Times a Week   Eat most of my meals in front of the TV or computer. Almost Everyday   Often skip meals, eat at random times, have no regular eating times. A Few Times a Week   Rarely sit down for a meal but snack or graze throughout.  Never   Eat extra snacks between meals. A Few Times a Week   Eat most of my food at the end of the day. A Few Times a Week   Eat in the middle of the night or wake up at night to eat. Never   Eat extra snacks to prevent or correct low blood sugar. Never   Eat to prevent acid reflux or stomach pain. Never   Worry  about not having enough food to eat. Never   Have you been to the food shelf at least a few times this year? No   I eat when I am depressed, stressed, anxious, or bored. A Few Times a Week   I eat when I am happy or as a reward. A Few Times a Week   I feel hungry all the time even if I just have eaten. Never   Feeling full is important to me. A Few Times a Week   Once I start eating, it is hard to stop. Never   I finish all the food on my plate even if I am already full. A Few Times a Week   I can't resist eating delicious food or walk past the good food/smell. A Few Times a Week   I eat/snack without noticing that I am eating. Never   I eat when I am preparing the meal. A Few Times a Week   I eat more than usual when I see others eating. A Few Times a Week   I have trouble not eating sweets, ice cream, cookies, or chips if they are around the house. A Few Times a Week   What foods, if any, do you crave? Chips/Crackers   Please list any other foods you crave? sweets   I feel out of control when eating. Weekly   I eat a large amount of food, like a loaf of bread, a box of cookies, a pint/quart of ice cream, all at once. Never   I eat a large amount of food even when I am not hungry. Never   I eat rapidly. Never   I eat alone because I feel embarrassed and do not want others to see how much I have eaten. Never   I eat until I am uncomfortably full. Never   I feel bad, disgusted, or guilty after I overeat. Monthly   I make myself vomit what I have eaten or use laxatives to get rid of food. Never     Often skips breakfast  Uses food to de stress in the evenings    Activity/Exercise History Reviewed With Patient 2/25/2019   How much of a typical 12 hour day do you spend sitting? Most of the Day   How much of a typical 12 hour day do you spend lying down? Less Than Half the Day   How much of a typical day do you spend walking/standing? Less Than Half the Day   How many hours (not including work) do you spend on the  TV/Video Games/Computer/Tablet/Phone? 1 Hour or Less   How many times a week are you active for the purpose of exercise? 2-3 Times a Week   How many total minutes do you spend doing some activity for the purpose of exercising when you exercise? 15-30 Minutes   What keeps you from being more active? Lack of Time, Too tired       PAST MEDICAL HISTORY:  Past Medical History:   Diagnosis Date     Allergic rhinitis, cause unspecified      Anemia      Depressive disorder, not elsewhere classified      Esophageal reflux      Fear of travel with panic attacks 4/30/2010     Hx of previous reproductive problem     ivf pregnancy     Migraines      Personal history of urinary calculi      PONV (postoperative nausea and vomiting)        Work/Social History Reviewed With Patient 2/25/2019   My employment status is: Full-Time   My job is: manager   How much of your job is spent on the computer or phone? 50%   What is your marital status? /In a Relationship   If in a relationship, is your significant other overweight? Yes   Do you have children? Yes   If you have children, are they overweight? No     Travels a lot for work    Mental Health History Reviewed With Patient 2/25/2019   Have you ever been physically or sexually abused? No   How often in the past 2 weeks have you felt little interest or pleasure in doing things? Not at all   Over the past 2 weeks how often have you felt down, depressed, or hopeless? Not at all     Very busy lifestyle, difficult to manage everything on her plate    Sleep History Reviewed With Patient 2/25/2019   How many hours do you sleep at night? 8   Do you think that you snore loudly or has anybody ever heard you snore loudly (louder than talking or so loud it can be heard behind a shut door)? No   Has anyone seen or heard you stop breathing during your sleep? No   Do you often feel tired, fatigued, or sleepy during the day? No       MEDICATIONS:   Current Outpatient Medications   Medication Sig  "Dispense Refill     CENTRUM OR TABS 1 TABLET DAILY       cetirizine (ZYRTEC) 10 MG tablet Take 10 mg by mouth daily.       Cholecalciferol (VITAMIN D) 1000 UNIT capsule Take 1 capsule by mouth daily.       Ferrous Sulfate (IRON SUPPLEMENT PO) Take  by mouth.       folic acid 800 MCG TABS Take  by mouth.       losartan (COZAAR) 100 MG tablet Take 1 tablet (100 mg) by mouth daily 90 tablet 1     ondansetron (ZOFRAN-ODT) 4 MG ODT tab DISSOLVE ONE TO TWO TABLETS ON TONGUE EVERY 8 HOURS AS NEEDED FOR NAUSEA 20 tablet 0     hydrOXYzine (VISTARIL) 25 MG capsule Take 1 capsule (25 mg) by mouth 3 times daily as needed for anxiety (Patient not taking: Reported on 2/28/2019) 20 capsule 1     LORazepam (ATIVAN) 0.5 MG tablet Take 0.5-1 tablets (0.25-0.5 mg) by mouth every 8 hours as needed for anxiety Take 30 minutes prior to departure.  Do not operate a vehicle after taking this medication (Patient not taking: Reported on 2/28/2019) 20 tablet 1     rizatriptan (MAXALT) 5 MG tablet Take 1-2 tablets (5-10 mg) by mouth at onset of headache for migraine May repeat dose in 2 hours.  Do not exceed 30 mg in 24 hours (Patient not taking: Reported on 1/11/2019) 18 tablet 3     scopolamine (TRANSDERM) 72 hr patch Place 1 patch onto the skin every 72 hours.  Apply to hairless area behind one ear at least 4 hours before travel.  Remove old patch and change every 3 days. (Patient not taking: Reported on 2/28/2019) 6 patch 2       ALLERGIES:   Allergies   Allergen Reactions     Iodine Hives     xray dye     Penicillins Hives     Hydrochlorothiazide Rash     Lexapro [Escitalopram Oxalate] Rash     Sulfa Drugs Rash       PHYSICAL EXAM:  /87   Pulse 85   Ht 1.657 m (5' 5.25\")   Wt 84.5 kg (186 lb 4.8 oz)   SpO2 97%   BMI 30.76 kg/m     *blood pressure normal at PCP 1/11/19    A & O x 3  HEENT: NCAT, mucous membranes moist  Respirations unlabored  Location of obesity: Central Obesity    ASSESSMENT:  Margie is a patient with mature " onset obesity with significant element of familial/genetic influence and with current health consequences. She does need aggressive weight loss plan.  Margie Becker eats a high carb diet, uses food as a reward, uses food as mood management, eats most meals in front of TV, mostly eats during the evening, tends to snack/graze throughout day, rarely sitting to eat a true meal and has a disorganized meal pattern.    Her problem is complicated by strong craving/reward pathways    PLAN:    Dietician visit of education    Aggressive  Ancillary testing:  n/a.  Food Plan:  See dietician, discussed volumetrics.   Activity Plan:  .  Supplementary: 24 week healthy lifestyle plan  Medication:  The patient will begin medication in pursuit of improved medical status as influenced by body weight. She will start naltrexone. Patient was made aware that naltrexone is not approved for the treatment of obesity.  There is a mutual understanding of the goals and risks of this therapy. The patient is in agreement. She is educated on dosage regimen and possible side effects.      Orders Placed This Encounter   Procedures     FOLLOW UP 24 WEEK HEALTHY LIFESTYLE PLAN       RTC:    12 weeks.    TIME: 60 min spent on evaluation, management, counseling, education, & motivational interviewing with greater than 50 % of the total time was spent on counseling and coordinating care    Sincerely,    RICHARD Owusu CNP

## 2019-02-28 NOTE — NURSING NOTE
"  Chief Complaint   Patient presents with     Weight Problem     24 Week Plan [facility] Provider     Vitals:    02/28/19 1405   BP: 153/87   Pulse: 85   SpO2: 97%   Weight: 84.5 kg (186 lb 4.8 oz)   Height: 1.657 m (5' 5.25\")     Body mass index is 30.76 kg/m .  Mis Leonard CMA    "

## 2019-02-28 NOTE — LETTER
"2019       RE: Margie Becker  [address] [address]     Dear Colleague,    Thank you for referring your patient, Margie Becker, to the [facility] MEDICAL WEIGHT MANAGEMENT at [facility]. Please see a copy of my visit note below.        New Medical Weight Management Consult    PATIENT:  Margie Becker  MRN:         [id number]  :         1970  ADIEL:         2019    Dear Cheryl Augustin,    I had the pleasure of seeing your patient, Margie Becker.  Full intake/assessment done to determine barriers to weight loss success and develop a treatment plan.  Margie Becker is a 48 year old female interested in treatment of medical problems associated with weight.  Her weight today is 186 lbs 4.8 oz, Body mass index is 30.76 kg/m ., and she has the following co-morbidities:     2019   I have the following co-morbidities associated with obesity: None of the above     HTN    Patient Goals Reviewed With Patient 2019   I am interested in attaining a healthier weight to diminish current health problems related to co-morbid conditions: No   I am interested in attaining a healthier weight in order to prevent future health problems: Yes     Change lifestyle, get back to healthy bmi    Referring Provider 2019   Please name the provider who referred you to Medical Weight Management.  If you do not know, please answer: \"I Don't Know\". Cheryl Augustin DO       Wt Readings from Last 4 Encounters:   19 84.5 kg (186 lb 4.8 oz)   19 84.4 kg (186 lb)   18 79.8 kg (176 lb)   18 79.8 kg (176 lb)       Weight History Reviewed With Patient 2019   How concerned are you about your weight? Somewhat Concerned   Would you describe your weight gain as gradual? Yes   I became overweight: As an Adult   The following factors have contributed to my weight gain:  Eating Wrong Types of Food, Eating Too Much, Lack of " Exercise, Genetic (Runs in the Family)   I have tried the following methods to lose weight: Weight Watchers   I have the following family history of obesity/being overweight:  One or more of my siblings are overweight   Has anyone in your family had weight loss surgery? No     Highest weight ever is right now-   Gained weight with pregnancy with twins 6 years ago.   110lb in early 20s  2014 1 year after pregnancy, 148lbs    Weight watchers pre-pregnancy    Has thought about weight loss a lot in the last two years but not acted on it    Lactose intolerance    Good control at work.    Snacks a lot in the evenings. In front of the tv      Diet Recall Reviewed With Patient 2/25/2019   How many glasses of juice do you drink in a typical day? 0   How many of glasses of milk do you drink in a typical day? 0   How many 8oz glasses of sugar containing drinks such as Andrea-Aid/sweet tea do you drink in a day? 0   How many cans/bottles of sugar pop/soda/tea/sports drinks do you drink in a day? 0   How many cans/bottles of diet pop/soda/tea or sports drink do you drink in a day? 2   How often do you have a drink of alcohol? 2-3 TImes a Week   If you do drink, how many drinks might you have in a day? 1 or 2   diet soda  Alcohol On the weekends    Eating Habits Reviewed With Patient 2/25/2019   Generally, my meals include foods like these: bread, pasta, rice, potatoes, corn, crackers, sweet dessert, pop, or juice. Almost Everyday   Generally, my meals include foods like these: fried meats, brats, burgers, french fries, pizza, cheese, chips, or ice cream. Half of the Week   Eat fast food (like McDonalds, Burger Herman, Taco Bell). A Few Times a Week   Eat at a buffet or sit-down restaurant. A Few Times a Week   Eat most of my meals in front of the TV or computer. Almost Everyday   Often skip meals, eat at random times, have no regular eating times. A Few Times a Week   Rarely sit down for a meal but snack or graze throughout.  Never    Eat extra snacks between meals. A Few Times a Week   Eat most of my food at the end of the day. A Few Times a Week   Eat in the middle of the night or wake up at night to eat. Never   Eat extra snacks to prevent or correct low blood sugar. Never   Eat to prevent acid reflux or stomach pain. Never   Worry about not having enough food to eat. Never   Have you been to the food shelf at least a few times this year? No   I eat when I am depressed, stressed, anxious, or bored. A Few Times a Week   I eat when I am happy or as a reward. A Few Times a Week   I feel hungry all the time even if I just have eaten. Never   Feeling full is important to me. A Few Times a Week   Once I start eating, it is hard to stop. Never   I finish all the food on my plate even if I am already full. A Few Times a Week   I can't resist eating delicious food or walk past the good food/smell. A Few Times a Week   I eat/snack without noticing that I am eating. Never   I eat when I am preparing the meal. A Few Times a Week   I eat more than usual when I see others eating. A Few Times a Week   I have trouble not eating sweets, ice cream, cookies, or chips if they are around the house. A Few Times a Week   What foods, if any, do you crave? Chips/Crackers   Please list any other foods you crave? sweets   I feel out of control when eating. Weekly   I eat a large amount of food, like a loaf of bread, a box of cookies, a pint/quart of ice cream, all at once. Never   I eat a large amount of food even when I am not hungry. Never   I eat rapidly. Never   I eat alone because I feel embarrassed and do not want others to see how much I have eaten. Never   I eat until I am uncomfortably full. Never   I feel bad, disgusted, or guilty after I overeat. Monthly   I make myself vomit what I have eaten or use laxatives to get rid of food. Never     Often skips breakfast  Uses food to de stress in the evenings    Activity/Exercise History Reviewed With Patient  2/25/2019   How much of a typical 12 hour day do you spend sitting? Most of the Day   How much of a typical 12 hour day do you spend lying down? Less Than Half the Day   How much of a typical day do you spend walking/standing? Less Than Half the Day   How many hours (not including work) do you spend on the TV/Video Games/Computer/Tablet/Phone? 1 Hour or Less   How many times a week are you active for the purpose of exercise? 2-3 Times a Week   How many total minutes do you spend doing some activity for the purpose of exercising when you exercise? 15-30 Minutes   What keeps you from being more active? Lack of Time, Too tired       PAST MEDICAL HISTORY:  Past Medical History:   Diagnosis Date     Allergic rhinitis, cause unspecified      Anemia      Depressive disorder, not elsewhere classified      Esophageal reflux      Fear of travel with panic attacks 4/30/2010     Hx of previous reproductive problem     ivf pregnancy     Migraines      Personal history of urinary calculi      PONV (postoperative nausea and vomiting)        Work/Social History Reviewed With Patient 2/25/2019   My employment status is: Full-Time   My job is: manager   How much of your job is spent on the computer or phone? 50%   What is your marital status? /In a Relationship   If in a relationship, is your significant other overweight? Yes   Do you have children? Yes   If you have children, are they overweight? No     Travels a lot for work    Mental Health History Reviewed With Patient 2/25/2019   Have you ever been physically or sexually abused? No   How often in the past 2 weeks have you felt little interest or pleasure in doing things? Not at all   Over the past 2 weeks how often have you felt down, depressed, or hopeless? Not at all     Very busy lifestyle, difficult to manage everything on her plate    Sleep History Reviewed With Patient 2/25/2019   How many hours do you sleep at night? 8   Do you think that you snore loudly or has  anybody ever heard you snore loudly (louder than talking or so loud it can be heard behind a shut door)? No   Has anyone seen or heard you stop breathing during your sleep? No   Do you often feel tired, fatigued, or sleepy during the day? No       MEDICATIONS:   Current Outpatient Medications   Medication Sig Dispense Refill     CENTRUM OR TABS 1 TABLET DAILY       cetirizine (ZYRTEC) 10 MG tablet Take 10 mg by mouth daily.       Cholecalciferol (VITAMIN D) 1000 UNIT capsule Take 1 capsule by mouth daily.       Ferrous Sulfate (IRON SUPPLEMENT PO) Take  by mouth.       folic acid 800 MCG TABS Take  by mouth.       losartan (COZAAR) 100 MG tablet Take 1 tablet (100 mg) by mouth daily 90 tablet 1     ondansetron (ZOFRAN-ODT) 4 MG ODT tab DISSOLVE ONE TO TWO TABLETS ON TONGUE EVERY 8 HOURS AS NEEDED FOR NAUSEA 20 tablet 0     hydrOXYzine (VISTARIL) 25 MG capsule Take 1 capsule (25 mg) by mouth 3 times daily as needed for anxiety (Patient not taking: Reported on 2/28/2019) 20 capsule 1     LORazepam (ATIVAN) 0.5 MG tablet Take 0.5-1 tablets (0.25-0.5 mg) by mouth every 8 hours as needed for anxiety Take 30 minutes prior to departure.  Do not operate a vehicle after taking this medication (Patient not taking: Reported on 2/28/2019) 20 tablet 1     rizatriptan (MAXALT) 5 MG tablet Take 1-2 tablets (5-10 mg) by mouth at onset of headache for migraine May repeat dose in 2 hours.  Do not exceed 30 mg in 24 hours (Patient not taking: Reported on 1/11/2019) 18 tablet 3     scopolamine (TRANSDERM) 72 hr patch Place 1 patch onto the skin every 72 hours.  Apply to hairless area behind one ear at least 4 hours before travel.  Remove old patch and change every 3 days. (Patient not taking: Reported on 2/28/2019) 6 patch 2       ALLERGIES:   Allergies   Allergen Reactions     Iodine Hives     xray dye     Penicillins Hives     Hydrochlorothiazide Rash     Lexapro [Escitalopram Oxalate] Rash     Sulfa Drugs Rash       PHYSICAL  "EXAM:  /87   Pulse 85   Ht 1.657 m (5' 5.25\")   Wt 84.5 kg (186 lb 4.8 oz)   SpO2 97%   BMI 30.76 kg/m      *blood pressure normal at PCP 1/11/19    A & O x 3  HEENT: NCAT, mucous membranes moist  Respirations unlabored  Location of obesity: Central Obesity    ASSESSMENT:  Margie is a patient with mature onset obesity with significant element of familial/genetic influence and with current health consequences. She does need aggressive weight loss plan.  Margie Becker eats a high carb diet, uses food as a reward, uses food as mood management, eats most meals in front of TV, mostly eats during the evening, tends to snack/graze throughout day, rarely sitting to eat a true meal and has a disorganized meal pattern.    Her problem is complicated by strong craving/reward pathways    PLAN:    Dietician visit of education    Aggressive  Ancillary testing:  n/a.  Food Plan:  See dietician, discussed volumetrics.   Activity Plan:  .  Supplementary: 24 week healthy lifestyle plan  Medication:  The patient will begin medication in pursuit of improved medical status as influenced by body weight. She will start naltrexone. Patient was made aware that naltrexone is not approved for the treatment of obesity.  There is a mutual understanding of the goals and risks of this therapy. The patient is in agreement. She is educated on dosage regimen and possible side effects.      Orders Placed This Encounter   Procedures     FOLLOW UP 24 WEEK HEALTHY LIFESTYLE PLAN       RTC:    12 weeks.    TIME: 60 min spent on evaluation, management, counseling, education, & motivational interviewing with greater than 50 % of the total time was spent on counseling and coordinating care    Sincerely,    RICHARD Owusu CNP     "

## 2019-03-06 ENCOUNTER — OFFICE VISIT (OUTPATIENT)
Dept: SURGERY | Facility: CLINIC | Age: 49
End: 2019-03-06
Payer: COMMERCIAL

## 2019-03-06 ENCOUNTER — OFFICE VISIT (OUTPATIENT)
Dept: ENDOCRINOLOGY | Facility: CLINIC | Age: 49
End: 2019-03-06
Payer: COMMERCIAL

## 2019-03-06 VITALS — BODY MASS INDEX: 30.81 KG/M2 | WEIGHT: 186.6 LBS

## 2019-03-06 DIAGNOSIS — E66.811 OBESITY, CLASS I, BMI 30-34.9: ICD-10-CM

## 2019-03-06 DIAGNOSIS — E66.9 OBESITY: Primary | ICD-10-CM

## 2019-03-06 NOTE — LETTER
"3/6/2019       RE: Margie Becker  [address] [address]     Dear Colleague,    Thank you for referring your patient, Margie Becker, to the [facility] SURGICAL WEIGHT MANAGEMENT at [facility]. Please see a copy of my visit note below.    New Nutrition Consultation Note    Margie Becker is a 48 year old female presents today for new nutrition consultation in the 24 week Comprehensive Weight Management Program.  Patient was referred by Nickie Crespo NP (2/28/19).    Patient is not using any meal replacement products available from clinic.     ANTHROPOMETRICS:  Estimated body mass index is 30.76 kg/m  as calculated from the following:    Height as of 2/28/19: 1.657 m (5' 5.25\").    Weight as of 2/28/19: 84.5 kg (186 lb 4.8 oz).   Current weight: 3/5/19: 186.6 lb with heels    NUTRITION HISTORY:  *Pt is lactose intolerant and states that whey will bother her sometimes. Pt is not using products.     Pt states that she tends to eat most of her calories in the evening. She is not much of a breakfast person. If she does eat breakfast she typically has soup; pt explained that she travels for work in [place] a lot and she states that they typically have soup for breakfast there and she has adopted that habit.     Pt states that she will eat dinner with her daughters and then have another dinner with her  at night. She explains that she is trying to have more family dinners.      Diet Recall:  Breakfast: skips or a small bite of aguiar or left overs   Snack: soup   Lunch: Skips or soup or food from the cafeteria  Dinner: pasta, ham, Argentine food, bread.   Snacks: chips, salty and crunchy snacks, ice cream, sweets   Beverages: water, coffee black, juice or pop with dinner     Exercise: spends time with her daughters, will take a walks with them when the weather is nice.     Nutrition Prescription  volumetrics (per MD)    Nutrition Diagnosis  Food and " nutrition related knowledge deficit r/t lack of prior exposure to nutrition education of meal replacement program aeb pt unable to verbalize understanding of meal replacements for weight loss.    Nutrition Intervention  Materials/Education provided: Educated on the plate method, healthy food choices and decreasing caloric beverages. Provided encouragement, support, goals, and RD contact information.  - Discussed snacking.     Patient Understanding: good  Expected Compliance: good  Follow-Up Plans: 1 month     Nutrition Goals  1.Meals and snacks should be consumed slowly 20-30 minutes   2. Eat three meals daily, have a back-up plan  3. For dinner make sure using the plate method   4. Try distracting yourself for 15 minutes when you have craving. Give someone a call, read a book, talk a walk, check email.       Follow-Up:  1 month     Time spent with patient: 15 minutes.    Again, thank you for allowing me to participate in the care of your patient.      Sincerely,    Dorothea Richmond RD

## 2019-03-06 NOTE — PATIENT INSTRUCTIONS
Wellness Vision:  Completed 5 minute Visioning Exercise with Patient:    I am outside and active with my daughters.  I have planned meals ready to go in the fridge to bring to work. Vegetables in my beatris boxes  I can see myself sewing in the evening  Question: How am I going to do that?  Answer: I have to make plans, lists, and be intentional about it. And, I have to remember to include myself -- we can all eat broccoli.     Goal:  1.) I will develop some new family patterns and routines.   2.)  I will buy a different snack for the evening; I will pause between servings, and eat slowly.    Resources: Mindful Eating Handout

## 2019-03-06 NOTE — LETTER
"3/6/2019       RE: Margie Becker  [address] [address]     Dear Colleague,    Thank you for referring your patient, Margie Becker, to the [facility] MEDICAL WEIGHT MANAGEMENT at [facility]. Please see a copy of my visit note below.    New Weight Management Health Coaching Note    3/6/2019  Health  Visit # 1    Margie Becker ([place])    Progress Assessment:  Initial Weight:   186 lb 4.8 oz  Initial Start Date: 2/28/2019   Current Weight: Taken in RD session  Water Intake: 30 oz daily  Weight Loss Medication: Will start naltrexone with NP (will start Friday afternoon)  Nutrition Plan: No Product: Had a short RD session with Dorothea due to insurance maybe not coving RD visits.  Will use MyFitnessPal to message Dorothea as needed. Patient is trying the \"aggressive\" approach first. 1,000-32593 calorie.    A combo of phone and in person sessions.    Nutrition Goals  1.Meals and snacks should be consumed slowly 20-30 minutes   2. Eat three meals daily, have a back-up plan  3. For dinner make sure using the plate method   4. Try distracting yourself for 15 minutes when you have craving. Give someone a call, read a book, talk a walk, check email.     Has 6 year old twin girls at home. Travels a lot, often [place], so jet-lag is hard.      Information Provided:  1.) 24 Week Healthy Lifestyle Plan Overview Handout:    Explained the structure of the 24 week plan; reviewed scheduling information    Discussed option to do coaching sessions via phone or in person  2.) Cooking and Exercise Class Handout  3.) Support Group Handout  4.) Explain the role of a HEALTH  and the FOUR Pillars of Health      Initial Intake:  The four pillars of well-being may impact your ability to manage weight. Rank your current level of satisfaction on a scale of 1-10 in each pillar.     Sleep: 10        Movement/Activity: 4 (I take walks with my girls, sometimes I do my bike, sedentary " job but I do travel a lot for work; travel to [place] a lot)        Nutrition: 5;  Ok during the day; the evening time is harder, de-compressing time with tv/food        Stress Management: 6; Super busy, Eating is an easy way to unwind. Evening stress eating.      1.) Which of the pillars are you most interested in making changes in first and why? Nutrition and Stress Management    2.) What do you know about yourself and weight? I was always slender as a teen, and could always eat whatever I wanted to. I started to put on weight in my 30s; now that I am in my 40s, I am just not used to thinking about weight.  I would like to shift into a family dinner.        Wellness Vision:  Completed 5 minute Visioning Exercise with Patient:    I am outside and active with my daughters.  I have planned meals ready to go in the fridge to bring to work. Vegetables in my beatris boxes  I can see myself sewing in the evening  Question: How am I going to do that?  Answer: I have to make plans, lists, and be intentional about it. And, I have to remember to include myself -- we can all eat broccoli.     Goal:  1.) I will develop some new family patterns and routines.   2.)  I will buy a different snack for the evening; I will pause between servings, and eat slowly.    Resources: Mindful Eating Handout            45 minutes spent with patient, including charting time.       Signature:  Vicky Jacobson  Lead Health   [facility] Weight Managment  [facility]      To schedule your next visit, please call [phone].

## 2019-03-06 NOTE — PROGRESS NOTES
"New Weight Management Health Coaching Note    3/6/2019  Health  Visit # 1    Margie Becker ([place])    Progress Assessment:  Initial Weight:   186 lb 4.8 oz  Initial Start Date: 2/28/2019   Current Weight: Taken in RD session  Water Intake: 30 oz daily  Weight Loss Medication: Will start naltrexone with NP (will start Friday afternoon)  Nutrition Plan: No Product: Had a short RD session with Dorothea due to insurance maybe not coving RD visits.  Will use [employer] to message Dorothea as needed. Patient is trying the \"aggressive\" approach first. 1,000-42553 calorie.    A combo of phone and in person sessions.    Nutrition Goals  1.Meals and snacks should be consumed slowly 20-30 minutes   2. Eat three meals daily, have a back-up plan  3. For dinner make sure using the plate method   4. Try distracting yourself for 15 minutes when you have craving. Give someone a call, read a book, talk a walk, check email.     Has 6 year old twin girls at home. Travels a lot, often [place], so jet-lag is hard.      Information Provided:  1.) 24 Week Healthy Lifestyle Plan Overview Handout:    Explained the structure of the 24 week plan; reviewed scheduling information    Discussed option to do coaching sessions via phone or in person  2.) Cooking and Exercise Class Handout  3.) Support Group Handout  4.) Explain the role of a HEALTH  and the FOUR Pillars of Health      Initial Intake:  The four pillars of well-being may impact your ability to manage weight. Rank your current level of satisfaction on a scale of 1-10 in each pillar.     Sleep: 10        Movement/Activity: 4 (I take walks with my girls, sometimes I do my bike, sedentary job but I do travel a lot for work; travel to [place] a lot)        Nutrition: 5;  Ok during the day; the evening time is harder, de-compressing time with tv/food        Stress Management: 6; Super busy, Eating is an easy way to unwind. Evening stress eating.      1.) Which of the pillars are you most " interested in making changes in first and why? Nutrition and Stress Management    2.) What do you know about yourself and weight? I was always slender as a teen, and could always eat whatever I wanted to. I started to put on weight in my 30s; now that I am in my 40s, I am just not used to thinking about weight.  I would like to shift into a family dinner.        Wellness Vision:  Completed 5 minute Visioning Exercise with Patient:    I am outside and active with my daughters.  I have planned meals ready to go in the fridge to bring to work. Vegetables in my beatris boxes  I can see myself sewing in the evening  Question: How am I going to do that?  Answer: I have to make plans, lists, and be intentional about it. And, I have to remember to include myself -- we can all eat broccoli.     Goal:  1.) I will develop some new family patterns and routines.   2.)  I will buy a different snack for the evening; I will pause between servings, and eat slowly.    Resources: Mindful Eating Handout            45 minutes spent with patient, including charting time.       Signature:  Vicky Jacobson  Lead Health   [facility] Weight Managment  [facility]      To schedule your next visit, please call [phone].

## 2019-03-06 NOTE — PATIENT INSTRUCTIONS
Nutrition Goals  1.Meals and snacks should be consumed slowly 20-30 minutes   2. Eat three meals daily, have a back-up plan  3. For dinner make sure using the plate method   4. Try distracting yourself for 15 minutes when you have craving. Give someone a call, read a book, talk a walk, check email.     Follow up with RD in one month or check in via my chart message.     Dorothea Richmond MS, RD, LD  If you need to schedule or reschedule with a dietitian please call [phone].

## 2019-03-06 NOTE — PROGRESS NOTES
"New Nutrition Consultation Note    Margie Becker is a 48 year old female presents today for new nutrition consultation in the 24 week Comprehensive Weight Management Program.  Patient was referred by Nickie Crespo NP (2/28/19).    Patient is not using any meal replacement products available from clinic.     ANTHROPOMETRICS:  Estimated body mass index is 30.76 kg/m  as calculated from the following:    Height as of 2/28/19: 1.657 m (5' 5.25\").    Weight as of 2/28/19: 84.5 kg (186 lb 4.8 oz).   Current weight: 3/5/19: 186.6 lb with heels    NUTRITION HISTORY:  *Pt is lactose intolerant and states that whey will bother her sometimes. Pt is not using products.     Pt states that she tends to eat most of her calories in the evening. She is not much of a breakfast person. If she does eat breakfast she typically has soup; pt explained that she travels for work in [place] a lot and she states that they typically have soup for breakfast there and she has adopted that habit.     Pt states that she will eat dinner with her daughters and then have another dinner with her  at night. She explains that she is trying to have more family dinners.      Diet Recall:  Breakfast: skips or a small bite of aguiar or left overs   Snack: soup   Lunch: Skips or soup or food from the cafeteria  Dinner: pasta, ham,  food, bread.   Snacks: chips, salty and crunchy snacks, ice cream, sweets   Beverages: water, coffee black, juice or pop with dinner     Exercise: spends time with her daughters, will take a walks with them when the weather is nice.     Nutrition Prescription  volumetrics (per MD)    Nutrition Diagnosis  Food and nutrition related knowledge deficit r/t lack of prior exposure to nutrition education of meal replacement program aeb pt unable to verbalize understanding of meal replacements for weight loss.    Nutrition Intervention  Materials/Education provided: Educated on the plate method, healthy food choices and " decreasing caloric beverages. Provided encouragement, support, goals, and RD contact information.  - Discussed snacking.     Patient Understanding: good  Expected Compliance: good  Follow-Up Plans: 1 month     Nutrition Goals  1.Meals and snacks should be consumed slowly 20-30 minutes   2. Eat three meals daily, have a back-up plan  3. For dinner make sure using the plate method   4. Try distracting yourself for 15 minutes when you have craving. Give someone a call, read a book, talk a walk, check email.       Follow-Up:  1 month     Time spent with patient: 15 minutes.  Dorothea Richmond, MS, RD, LD

## 2019-03-12 DIAGNOSIS — F40.01 FEAR OF TRAVEL WITH PANIC ATTACKS: ICD-10-CM

## 2019-03-12 RX ORDER — LORAZEPAM 0.5 MG/1
0.25-0.5 TABLET ORAL EVERY 8 HOURS PRN
Qty: 20 TABLET | Refills: 1 | Status: SHIPPED | OUTPATIENT
Start: 2019-03-12 | End: 2019-05-21

## 2019-03-12 NOTE — TELEPHONE ENCOUNTER
LORazepam (ATIVAN) 0.5 MG tablet 20 tablet 1 12/4/2018  No   Sig - Route: Take 0.5-1 tablets (0.25-0.5 mg) by mouth every 8 hours as needed for anxiety Take 30 minutes prior to departure.  Do not operate a vehicle after taking this medication - Oral   Patient not taking: Reported on 2/28/2019        Class: Local Print   Order: [id number]       Last Office Visit: 1/11/2019  Future Office visit:       Routing refill request to provider for review/approval because:  Drug not on the [facility], P or [facility] refill protocol or controlled substance

## 2019-03-20 ENCOUNTER — OFFICE VISIT (OUTPATIENT)
Dept: ENDOCRINOLOGY | Facility: CLINIC | Age: 49
End: 2019-03-20
Payer: COMMERCIAL

## 2019-03-20 DIAGNOSIS — E66.9 OBESITY: Primary | ICD-10-CM

## 2019-03-20 NOTE — PATIENT INSTRUCTIONS
GOALS:  1.) I will cut the portions in half. Eat slowly (breakfast and lunch will be provided at [employer] work meeting).  2.) I will chose salads or cutting the portion in half and leaving the rest.       Mindful Eating  [email or web address]          30 minutes spent with patient including charting time.  Patient was given instructions to set up next appointment.     Signature:  Vicky Jacobson  Lead Health   [facility] Weight Managment  [facility]        To schedule your next visit, please call [phone].

## 2019-03-20 NOTE — LETTER
3/20/2019       RE: Margie Becker  [address] [address]     Dear Colleague,    Thank you for referring your patient, Margie Becker, to the [facility] MEDICAL WEIGHT MANAGEMENT at [facility]. Please see a copy of my visit note below.    Return Weight Management Health Coaching Note    3/20/19  Health  Visit #2     Margie Becker    Progress Assessment:    Weight: 184.4  Water Intake: 40 oz per day  Weight Loss Medication: Not started yet (has had to work weekends) Will consider if she wants to try it after work trip.   Nutrition Plan:              1,000-1,200 calorie meal replacement plan: No product     In the middle of a big work project, traveling next week.  Keeping my energy up on my trip with food; not eating the donuts etc provided at the meetings.  Talked about how to approach work trip.    Cutting the potion in half on my plate. Eat slowly.      PREVIOUS GOAL REVIEW:   1.) I will develop some new family patterns and routines-- MET (ate as a family half the nights)   2.)  I will buy a different snack for the evening; I will pause between servings, and eat slowly MET      GOALS:  1.) I will cut the portions in half. Eat slowly (breakfast and lunch will be provided at [employer] work meeting).  2.) I will chose salads or cutting the portion in half and leaving the rest.       Mindful Eating  [email or web address]      30 minutes spent with patient including charting time.  Patient was given instructions to set up next appointment.     Signature:  Vicky Jacobson  Lead Health   [place] [facility]  [facility]      To schedule your next visit, please call [phone].    Again, thank you for allowing me to participate in the care of your patient.      Sincerely,    Vicky Jacobson

## 2019-03-20 NOTE — PROGRESS NOTES
Return Weight Management Health Coaching Note    3/20/19  Health  Visit #2     Margie Becker    Progress Assessment:    Weight: 184.4  Water Intake: 40 oz per day  Weight Loss Medication: Not started yet (has had to work weekends) Will consider if she wants to try it after work trip.   Nutrition Plan:              1,000-1,200 calorie meal replacement plan: No product     In the middle of a big work project, traveling next week.  Keeping my energy up on my trip with food; not eating the donuts etc provided at the meetings.  Talked about how to approach work trip.    Cutting the potion in half on my plate. Eat slowly.      PREVIOUS GOAL REVIEW:   1.) I will develop some new family patterns and routines-- MET (ate as a family half the nights)   2.)  I will buy a different snack for the evening; I will pause between servings, and eat slowly MET      GOALS:  1.) I will cut the portions in half. Eat slowly (breakfast and lunch will be provided at [employer] work meeting).  2.) I will chose salads or cutting the portion in half and leaving the rest.       Mindful Eating  [email or web address]          30 minutes spent with patient including charting time.  Patient was given instructions to set up next appointment.     Signature:  Vicky Jacobson  Lead Health   [facility] Weight Managment  [facility]        To schedule your next visit, please call [phone].

## 2019-04-17 ENCOUNTER — OFFICE VISIT (OUTPATIENT)
Dept: ENDOCRINOLOGY | Facility: CLINIC | Age: 49
End: 2019-04-17
Payer: COMMERCIAL

## 2019-04-17 VITALS — BODY MASS INDEX: 30.91 KG/M2 | WEIGHT: 187.2 LBS

## 2019-04-17 DIAGNOSIS — E66.9 OBESITY: Primary | ICD-10-CM

## 2019-04-17 NOTE — PROGRESS NOTES
Return Weight Management Health Coaching Note    Margie Becker                                                              4/17/19   Visit # 3      Progress Assessment:    Current Weight: 187.2  Water Intake: patient not sure  Weight Loss Medication: Patient decided not to start weight loss medication; patient is counting calories and feels confident without medication so would prefer to try without medication first. Tracking on mycalorie count. Setting out portions of nuts for snacks.   Nutrition Plan:  No product    Goal Review: Patient learned she will have to bring food with her (pack snacks, pre-plan, carry a water bottle, some bars).    Work on Weekends: Harder to eat well during weekend time;  is always treating me with food.      Action Plan/Goals Set: 4/17/19  1.) I will buy a water bottle  2.) I will bring water bottle, pack snacks (portioned nuts and bars)   3.) I will give my  alternative ideas for gifts (other than treats, and take-out); I will ask him to have the take-out without me as his own treat.   4.) I will have a weekend food plan; I will plan ahead of time what I will eat for social event.  5.) I will take a mindful pause: Do I really want this? Am I hungry? Am I thirsty? Am I bored, tired, gifting myself, stressed, frustrated? (Wait 15-30 minute).  6.) Reflect each day: What choices am I proud of that I made today?    Double Sided Journal    Double-sided Journal          [email or web address]                 Signature:    Vicky Jacobson  Lead Health   [facility] Weight Managment  [facility]      To schedule your next visit, please call [phone].

## 2019-04-17 NOTE — PATIENT INSTRUCTIONS
Action Plan/Goals Set: 4/17/19  1.) I will buy a water bottle  2.) I will bring water bottle, pack snacks (portioned nuts and bars)   3.) I will give my  alternative ideas for gifts (other than treats, and take-out); I will ask him to have the take-out without me as his own treat.   4.) I will have a weekend food plan; I will plan ahead of time what I will eat for social event.  5.) I will take a mindful pause: Do I really want this? Am I hungry? Am I thirsty? Am I bored, tired, gifting myself, stressed, frustrated? (Wait 15-30 minute).  6.) Reflect each day: What choices am I proud of that I made today?    Double Sided Journal    Double-sided Journal          [email or web address]                 Signature:    Vicky Jacobson  Lead Health   [facility] Weight Managment  [facility] [place]

## 2019-04-17 NOTE — LETTER
4/17/2019       RE: Margie Becker  [address] [facility] [address]     Dear Colleague,    Thank you for referring your patient, Margie Becker, to the [facility] MEDICAL WEIGHT MANAGEMENT at [facility]. Please see a copy of my visit note below.    Return Weight Management Health Coaching Note    Margie Becker                                                              4/17/19  HC Visit # 3      Progress Assessment:    Current Weight: 187.2  Water Intake: patient not sure  Weight Loss Medication: Patient decided not to start weight loss medication; patient is counting calories and feels confident without medication so would prefer to try without medication first. Tracking on mycalorie count. Setting out portions of nuts for snacks.   Nutrition Plan:  No product    Goal Review: Patient learned she will have to bring food with her (pack snacks, pre-plan, carry a water bottle, some bars).    Work on Weekends: Harder to eat well during weekend time;  is always treating me with food.      Action Plan/Goals Set: 4/17/19  1.) I will buy a water bottle  2.) I will bring water bottle, pack snacks (portioned nuts and bars)   3.) I will give my  alternative ideas for gifts (other than treats, and take-out); I will ask him to have the take-out without me as his own treat.   4.) I will have a weekend food plan; I will plan ahead of time what I will eat for social event.  5.) I will take a mindful pause: Do I really want this? Am I hungry? Am I thirsty? Am I bored, tired, gifting myself, stressed, frustrated? (Wait 15-30 minute).  6.) Reflect each day: What choices am I proud of that I made today?    Double Sided Journal    Double-sided Journal          [email or web address]       Signature:    Vicky Jacobson  Lead Health   [place] [facility]  [facility]      To  schedule your next visit, please call [phone].

## 2019-05-10 ENCOUNTER — OFFICE VISIT (OUTPATIENT)
Dept: ENDOCRINOLOGY | Facility: CLINIC | Age: 49
End: 2019-05-10
Payer: COMMERCIAL

## 2019-05-10 NOTE — LETTER
5/10/2019     RE: Margie Becker  [address] [facility] [address]     Dear Colleague,    Thank you for referring your patient, Margie Becker, to the [facility] MEDICAL WEIGHT MANAGEMENT at [facility]. Please see a copy of my visit note below.    ZAC THOMAS Notes    Return Weight Management Health Coaching Note    Margie Becker          MRN: [id number]  : 1970  ADIEL: May 10, 2019    Health  Visit #: 4    Telephone Visit:     ASSESSMENT:    Current Weight (lbs): NA  Average Daily Water (ounces): close to 64 oz  Weight Loss Medication:  None  Nutrition Plan: No product; does muscle milk.    PREVIOUS GOAL(S) REVIEW:1.) I will buy a water bottle: MET  2.) I will bring water bottle, pack snacks (portioned nuts and bars) MET  3.) I will give my  alternative ideas for gifts (other than treats, and take-out); I will ask him to have the take-out without me as his own treat. MET  4.) I will have a weekend food plan; I will plan ahead of time what I will eat for social event: IN PROGRESS  5.) I will take a mindful pause: Do I really want this? Am I hungry? Am I thirsty? Am I bored, tired, gifting myself, stressed, frustrated? (Wait 15-30 minute). IN PROGRESS; Wants it to evolve into a regular habit; wear a rubber band to help remind me.   6.) Reflect each day: What choices am I proud of that I made today? IN PROGRESS       PILLAR(S) DISCUSSED IN THIS VISIT:       Stress Eating is much better now. Always need to balance with travel; and summer fun.   I will chose to sort of participate but make my own unique choice. I want to set a good example for my girls of not being hyper focused on deprivation. I want to also take care of myself and model health. Talked about how to talk about the 24 week program with my daughters; so it is about health and not weight loss.     GOALS:    Practice the mindful pauses    Feeling Proud vs.  Resentful    Mentally getting ready for mothers' day weekend and summer: (plan to get an alternative like a drink at [employer]; eat before social events; be well rested; bring my water bottle with me to parties)           FOLLOW-UP:  To schedule your next visit, please call [phone].      TIME:  30 minutes spent with patient, including charting time.       SIGNATURE:   Vicky Jacobson  Lead Health   [facility] Weight Managment  [facility] [place]

## 2019-05-10 NOTE — PROGRESS NOTES
ZAC THOMAS    Progress Notes    Return Weight Management Health Coaching Note    Margie Becker          MRN: [id number]  : 1970  ADIEL: May 10, 2019    Health  Visit #: 4    Telephone Visit:     ASSESSMENT:    Current Weight (lbs): NA  Average Daily Water (ounces): close to 64 oz  Weight Loss Medication:  None  Nutrition Plan: No product; does muscle milk.    PREVIOUS GOAL(S) REVIEW:1.) I will buy a water bottle: MET  2.) I will bring water bottle, pack snacks (portioned nuts and bars) MET  3.) I will give my  alternative ideas for gifts (other than treats, and take-out); I will ask him to have the take-out without me as his own treat. MET  4.) I will have a weekend food plan; I will plan ahead of time what I will eat for social event: IN PROGRESS  5.) I will take a mindful pause: Do I really want this? Am I hungry? Am I thirsty? Am I bored, tired, gifting myself, stressed, frustrated? (Wait 15-30 minute). IN PROGRESS; Wants it to evolve into a regular habit; wear a rubber band to help remind me.   6.) Reflect each day: What choices am I proud of that I made today? IN PROGRESS       PILLAR(S) DISCUSSED IN THIS VISIT:       Stress Eating is much better now. Always need to balance with travel; and summer fun.   I will chose to sort of participate but make my own unique choice. I want to set a good example for my girls of not being hyper focused on deprivation. I want to also take care of myself and model health. Talked about how to talk about the 24 week program with my daughters; so it is about health and not weight loss.     GOALS:    Practice the mindful pauses    Feeling Proud vs. Resentful    Mentally getting ready for mothers' day weekend and summer: (plan to get an alternative like a drink at [employer]; eat before social events; be well rested; bring my water bottle with me to parties)           FOLLOW-UP:  To schedule your next visit, please call [phone].      TIME:  30  minutes spent with patient, including charting time.       SIGNATURE:   Vicky Jacobson  Lead Health   [facility] Weight Managment  [facility] [place]

## 2019-05-19 DIAGNOSIS — R11.0 NAUSEA: ICD-10-CM

## 2019-05-20 DIAGNOSIS — F40.01 FEAR OF TRAVEL WITH PANIC ATTACKS: ICD-10-CM

## 2019-05-20 NOTE — TELEPHONE ENCOUNTER
"Requested Prescriptions   Pending Prescriptions Disp Refills     ondansetron (ZOFRAN-ODT) 4 MG ODT tab [Pharmacy Med Name: ONDANSETRON 4MG TBDP]  Last Written Prescription Date:  3/12/2019  Last Fill Quantity: 20 tablet,  # refills: 0   Last office visit: 1/11/2019 with prescribing provider:  SHANE Augustin   Future Office Visit:     20 tablet 0     Sig: DISSOLVE ONE TO TWO TABLETS ON TONGUE EVERY 8 HOURS AS NEEDED FOR NAUSEA        Antivertigo/Antiemetic Agents Passed - 5/19/2019 10:06 AM        Passed - Recent (12 mo) or future (30 days) visit within the authorizing provider's specialty     Patient had office visit in the last 12 months or has a visit in the next 30 days with authorizing provider or within the authorizing provider's specialty.  See \"Patient Info\" tab in inbasket, or \"Choose Columns\" in Meds & Orders section of the refill encounter.              Passed - Medication is active on med list        Passed - Patient is 18 years of age or older          "

## 2019-05-21 RX ORDER — ONDANSETRON 4 MG/1
TABLET, ORALLY DISINTEGRATING ORAL
Qty: 20 TABLET | Refills: 0 | Status: SHIPPED | OUTPATIENT
Start: 2019-05-21 | End: 2019-07-02

## 2019-05-21 RX ORDER — LORAZEPAM 0.5 MG/1
0.25-0.5 TABLET ORAL EVERY 8 HOURS PRN
Qty: 20 TABLET | Refills: 1 | Status: SHIPPED | OUTPATIENT
Start: 2019-05-21 | End: 2019-08-30

## 2019-05-21 NOTE — TELEPHONE ENCOUNTER
Per Patient Care Supervisor, routing 10 refill requests to each provider due to being non-compliant.    Sharri Soriano RN

## 2019-05-23 DIAGNOSIS — F40.01 FEAR OF TRAVEL WITH PANIC ATTACKS: ICD-10-CM

## 2019-05-23 NOTE — TELEPHONE ENCOUNTER
LORazepam (ATIVAN) 0.5 MG tablet      Last Written Prescription Date:  5/21/2019  Last Fill Quantity: 20 TABLET,   # refills: 1  Last Office Visit: 1/11/2019  SHANE KRAUS    Future Office visit:       Routing refill request to provider for review/approval because:  Drug not on the [facility], [facility] or [facility] refill protocol or controlled substance

## 2019-05-28 RX ORDER — LORAZEPAM 0.5 MG/1
0.25-0.5 TABLET ORAL EVERY 8 HOURS PRN
Qty: 20 TABLET | Refills: 1 | OUTPATIENT
Start: 2019-05-28

## 2019-05-31 ENCOUNTER — OFFICE VISIT (OUTPATIENT)
Dept: ENDOCRINOLOGY | Facility: CLINIC | Age: 49
End: 2019-05-31
Payer: COMMERCIAL

## 2019-05-31 NOTE — LETTER
2019       RE: Margie Becker  [address] [facility] [facility] [address]     Dear Colleague,    Thank you for referring your patient, Margie Becker, to the [facility] MEDICAL WEIGHT MANAGEMENT at [facility]. Please see a copy of my visit note below.    VICKY THOMAS   Referred by: Charli    Progress Notes    Return Weight Management Health Coaching Note    Margie Becker          MRN: [id number]  : 1970  ADIEL: May 31, 2019    Health  Visit #: 5  In Person Visit:      ASSESSMENT:  Initial Weight: 186 lbs 4.8 oz     Current Weight (lbs): 181.1 lb  Average Daily Water (ounces): 64 oz  Weight Loss Medication: None  Nutrition Plan: No product    PREVIOUS GOAL(S) REVIEW:  Practice the mindful pauses-- in PROGRESS     Feeling Proud vs. Resentful-- in PROGRESS     Mentally getting ready for mothers' day weekend and summer: (plan to get an alternative like a drink at [employer]; eat before social events; be well rested; bring my water bottle with me to parties)       PILLAR(S) DISCUSSED IN THIS VISIT:       Pt wants more energy; hard to keep energy up with less calories. Late afternoon fatigue. Protien has helped me. Pt wants to do high protein snack in afternoon. Wants more variety.     Pacing myself is important      GOALS:      High protien snack in afternoon.    Value of family meals together/healthy living (vision board with my daughters)      FOLLOW-UP:  To schedule your next visit, please call [phone].      TIME:    30 minutes spent with patient, including charting time.       SIGNATURE:  Vicky Thmoas  Lead Health   [facility]  [facility] [place]

## 2019-05-31 NOTE — PROGRESS NOTES
VICKY THOMAS   Referred by: Charli    Progress Notes    Return Weight Management Health Coaching Note    Margie Becker          MRN: [id number]  : 1970  ADIEL: May 31, 2019    Health  Visit #: 5  In Person Visit:      ASSESSMENT:  Initial Weight: 186 lbs 4.8 oz     Current Weight (lbs): 181.1 lb  Average Daily Water (ounces): 64 oz  Weight Loss Medication: None  Nutrition Plan: No product    PREVIOUS GOAL(S) REVIEW:  Practice the mindful pauses-- in PROGRESS     Feeling Proud vs. Resentful-- in PROGRESS     Mentally getting ready for mothers' day weekend and summer: (plan to get an alternative like a drink at [employer]; eat before social events; be well rested; bring my water bottle with me to parties)       PILLAR(S) DISCUSSED IN THIS VISIT:       Pt wants more energy; hard to keep energy up with less calories. Late afternoon fatigue. Protien has helped me. Pt wants to do high protein snack in afternoon. Wants more variety.     Pacing myself is important      GOALS:      High protien snack in afternoon.    Value of family meals together/healthy living (vision board with my daughters)      FOLLOW-UP:  To schedule your next visit, please call [phone].      TIME:    30 minutes spent with patient, including charting time.       SIGNATURE:  Vicky Thomas  Lead Health   [facility] Weight Managment  [facility] [place]

## 2019-06-07 ENCOUNTER — OFFICE VISIT (OUTPATIENT)
Dept: ENDOCRINOLOGY | Facility: CLINIC | Age: 49
End: 2019-06-07
Payer: COMMERCIAL

## 2019-06-07 NOTE — LETTER
2019       RE: Margie Becker  [address] [facility] [address]     Dear Colleague,    Thank you for referring your patient, Margie Becker, to the [facility] MEDICAL WEIGHT MANAGEMENT at [facility]. Please see a copy of my visit note below.    VICKY JACOBSON  Referred by: Charli Sears Notes    Return Weight Management Health Coaching Note    Margie Becker          MRN: [id number]  : 1970  ADIEL: 2019    Health  Visit #: 6    Telephone Visit:     Will be on the road for the next 3 weeks; looking forward to hiking at [employer] and a work trip. Pt will then schedule when she returns in late  or early July.    ASSESSMENT:  Initial Weight: 186 lbs 4.8 oz    Current Weight (lbs):  Self reported, 175 lb  Average Daily Water (ounces): 60 oz  Weight Loss Medication: None  Nutrition Plan: No product    PREVIOUS GOAL(S) REVIEW:    High protien snack in afternoon:  MET, IN Progress: smoked salmon; nuts; muscle milk     Value of family meals together/healthy living (vision board with my daughters) IN PROGRESS      PILLAR(S) DISCUSSED IN THIS VISIT:     Pt talked about being mindful as she does her work/travel trips. Packing snacks and selecting from high quality choices.     Wanting to increase my physical activity; hoping this trip will help boost that getting into my daily routine. Having multiple options for exercise so it doesn't get bored.       GOALS:    I will continue to have a high protein snack in the afternoons    FOLLOW-UP:  To schedule your next visit, please call [phone].      TIME:  30 minutes spent with patient, including charting time.       SIGNATURE:  Vicky Jacobson  Lead Health   [place] [facility]  [facility]

## 2019-06-07 NOTE — PROGRESS NOTES
VICKY THOMAS  Referred by: Charli    Progress Notes    Return Weight Management Health Coaching Note    Margie Becker          MRN: [id number]  : 1970  ADIEL: 2019    Health  Visit #: 6    Telephone Visit:     Will be on the road for the next 3 weeks; looking forward to hiking at [employer] and a work trip. Pt will then schedule when she returns in late  or early July.    ASSESSMENT:  Initial Weight: 186 lbs 4.8 oz    Current Weight (lbs):  Self reported, 175 lb  Average Daily Water (ounces): 60 oz  Weight Loss Medication: None  Nutrition Plan: No product    PREVIOUS GOAL(S) REVIEW:    High protien snack in afternoon:  MET, IN Progress: smoked salmon; nuts; muscle milk     Value of family meals together/healthy living (vision board with my daughters) IN PROGRESS      PILLAR(S) DISCUSSED IN THIS VISIT:     Pt talked about being mindful as she does her work/travel trips. Packing snacks and selecting from high quality choices.     Wanting to increase my physical activity; hoping this trip will help boost that getting into my daily routine. Having multiple options for exercise so it doesn't get bored.       GOALS:    I will continue to have a high protein snack in the afternoons    FOLLOW-UP:  To schedule your next visit, please call [phone].      TIME:  30 minutes spent with patient, including charting time.       SIGNATURE:  Vicky Thomas  [facility]   [facility] Managment  [facility] [place]

## 2019-07-02 DIAGNOSIS — R11.0 NAUSEA: ICD-10-CM

## 2019-07-02 RX ORDER — ONDANSETRON 4 MG/1
TABLET, ORALLY DISINTEGRATING ORAL
Qty: 20 TABLET | Refills: 0 | Status: SHIPPED | OUTPATIENT
Start: 2019-07-02 | End: 2019-08-30

## 2019-07-02 NOTE — TELEPHONE ENCOUNTER
Prescription approved per [facility] Refill Protocol.  Latoya Thomas RN

## 2019-07-02 NOTE — TELEPHONE ENCOUNTER
"Requested Prescriptions   Pending Prescriptions Disp Refills     ondansetron (ZOFRAN-ODT) 4 MG ODT tab [Pharmacy Med Name: ONDANSETRON 4MG TBDP]  Last Written Prescription Date:  5/21/2019  Last Fill Quantity: 20 tabs,  # refills: 0   Last office visit: 1/11/2019 with prescribing provider:  Charli   Future Office Visit:     20 tablet 0     Sig: DISSOLVE ONE TO TWO TABLETS ON TONGUE EVERY 8 HOURS AS NEEDED FOR NAUSEA        Antivertigo/Antiemetic Agents Passed - 7/2/2019  7:36 AM        Passed - Recent (12 mo) or future (30 days) visit within the authorizing provider's specialty     Patient had office visit in the last 12 months or has a visit in the next 30 days with authorizing provider or within the authorizing provider's specialty.  See \"Patient Info\" tab in inbasket, or \"Choose Columns\" in Meds & Orders section of the refill encounter.              Passed - Medication is active on med list        Passed - Patient is 18 years of age or older          "

## 2019-08-01 DIAGNOSIS — I10 HYPERTENSION GOAL BP (BLOOD PRESSURE) < 140/90: ICD-10-CM

## 2019-08-01 NOTE — TELEPHONE ENCOUNTER
"Requested Prescriptions   Pending Prescriptions Disp Refills     losartan (COZAAR) 100 MG tablet [Pharmacy Med Name: LOSARTAN POTASSIUM 100MG TABS]  Last Written Prescription Date:  1/11/2019  Last Fill Quantity: 90 tablet,  # refills: 1   Last office visit: 1/11/2019 with prescribing provider:  SHANE Augustin   Future Office Visit:     90 tablet 1     Sig: TAKE ONE TABLET BY MOUTH ONCE DAILY       Angiotensin-II Receptors Failed - 8/1/2019  7:47 AM        Failed - Blood pressure under 140/90 in past 12 months     BP Readings from Last 3 Encounters:   02/28/19 153/87   01/11/19 122/84   07/25/18 128/76             Passed - Recent (12 mo) or future (30 days) visit within the authorizing provider's specialty     Patient had office visit in the last 12 months or has a visit in the next 30 days with authorizing provider or within the authorizing provider's specialty.  See \"Patient Info\" tab in inbasket, or \"Choose Columns\" in Meds & Orders section of the refill encounter.              Passed - Medication is active on med list        Passed - Patient is age 18 or older        Passed - No active pregnancy on record        Passed - Normal serum creatinine on file in past 12 months     Recent Labs   Lab Test 01/11/19  1459   CR 0.88             Passed - Normal serum potassium on file in past 12 months     Recent Labs   Lab Test 01/11/19  1459   POTASSIUM 4.1              Passed - No positive pregnancy test in past 12 months          "

## 2019-08-05 ENCOUNTER — MYC REFILL (OUTPATIENT)
Dept: FAMILY MEDICINE | Facility: CLINIC | Age: 49
End: 2019-08-05

## 2019-08-05 DIAGNOSIS — I10 HYPERTENSION GOAL BP (BLOOD PRESSURE) < 140/90: ICD-10-CM

## 2019-08-05 NOTE — TELEPHONE ENCOUNTER
"Requested Prescriptions   Pending Prescriptions Disp Refills     losartan (COZAAR) 100 MG tablet  Last Written Prescription Date:  1/11/2019  Last Fill Quantity: 90 tabs,  # refills: 1   Last office visit: 1/11/2019 with prescribing provider:  Charli   Future Office Visit:   Next 5 appointments (look out 90 days)    Aug 30, 2019 12:40 PM CDT  [facility] with Cheryl Charli, DO  [facility] [address] [place] [id number] [phone]          90 tablet 1     Sig: Take 1 tablet (100 mg) by mouth daily       Angiotensin-II Receptors Failed - 8/5/2019 11:29 AM        Failed - Blood pressure under 140/90 in past 12 months     BP Readings from Last 3 Encounters:   02/28/19 153/87   01/11/19 122/84   07/25/18 128/76                 Passed - Recent (12 mo) or future (30 days) visit within the authorizing provider's specialty     Patient had office visit in the last 12 months or has a visit in the next 30 days with authorizing provider or within the authorizing provider's specialty.  See \"Patient Info\" tab in inbasket, or \"Choose Columns\" in Meds & Orders section of the refill encounter.              Passed - Medication is active on med list        Passed - Patient is age 18 or older        Passed - No active pregnancy on record        Passed - Normal serum creatinine on file in past 12 months     Recent Labs   Lab Test 01/11/19  1459   CR 0.88             Passed - Normal serum potassium on file in past 12 months     Recent Labs   Lab Test 01/11/19  1459   POTASSIUM 4.1                    Passed - No positive pregnancy test in past 12 months          "

## 2019-08-06 RX ORDER — LOSARTAN POTASSIUM 100 MG/1
TABLET ORAL
Qty: 90 TABLET | Refills: 1 | Status: SHIPPED | OUTPATIENT
Start: 2019-08-06 | End: 2019-08-30

## 2019-08-06 NOTE — TELEPHONE ENCOUNTER
Prescription approved per [facility] [place].    Patient has upcoming appointment on 8/30 to address BP.    Bacilio Anguiano RN

## 2019-08-08 RX ORDER — LOSARTAN POTASSIUM 100 MG/1
100 TABLET ORAL DAILY
Qty: 90 TABLET | Refills: 1 | Status: SHIPPED | OUTPATIENT
Start: 2019-08-08 | End: 2020-02-04

## 2019-08-14 ENCOUNTER — TELEPHONE (OUTPATIENT)
Dept: SURGERY | Facility: CLINIC | Age: 49
End: 2019-08-14

## 2019-08-30 ENCOUNTER — OFFICE VISIT (OUTPATIENT)
Dept: FAMILY MEDICINE | Facility: CLINIC | Age: 49
End: 2019-08-30
Payer: COMMERCIAL

## 2019-08-30 VITALS
HEART RATE: 76 BPM | TEMPERATURE: 98.2 F | HEIGHT: 65 IN | BODY MASS INDEX: 30.56 KG/M2 | WEIGHT: 183.4 LBS | SYSTOLIC BLOOD PRESSURE: 134 MMHG | DIASTOLIC BLOOD PRESSURE: 70 MMHG

## 2019-08-30 DIAGNOSIS — Z00.00 ROUTINE GENERAL MEDICAL EXAMINATION AT A HEALTH CARE FACILITY: Primary | ICD-10-CM

## 2019-08-30 DIAGNOSIS — R11.0 NAUSEA: ICD-10-CM

## 2019-08-30 DIAGNOSIS — I10 HYPERTENSION GOAL BP (BLOOD PRESSURE) < 140/90: ICD-10-CM

## 2019-08-30 DIAGNOSIS — T75.3XXA MOTION SICKNESS, INITIAL ENCOUNTER: ICD-10-CM

## 2019-08-30 DIAGNOSIS — Z13.6 CARDIOVASCULAR SCREENING; LDL GOAL LESS THAN 130: ICD-10-CM

## 2019-08-30 DIAGNOSIS — F40.01 FEAR OF TRAVEL WITH PANIC ATTACKS: ICD-10-CM

## 2019-08-30 LAB
ANION GAP SERPL CALCULATED.3IONS-SCNC: 8 MMOL/L (ref 3–14)
BUN SERPL-MCNC: 11 MG/DL (ref 7–30)
CALCIUM SERPL-MCNC: 8.8 MG/DL (ref 8.5–10.1)
CHLORIDE SERPL-SCNC: 108 MMOL/L (ref 94–109)
CHOLEST SERPL-MCNC: 212 MG/DL
CO2 SERPL-SCNC: 23 MMOL/L (ref 20–32)
CREAT SERPL-MCNC: 0.94 MG/DL (ref 0.52–1.04)
GFR SERPL CREATININE-BSD FRML MDRD: 71 ML/MIN/{1.73_M2}
GLUCOSE SERPL-MCNC: 71 MG/DL (ref 70–99)
HDLC SERPL-MCNC: 43 MG/DL
LDLC SERPL CALC-MCNC: 115 MG/DL
NONHDLC SERPL-MCNC: 169 MG/DL
POTASSIUM SERPL-SCNC: 4.2 MMOL/L (ref 3.4–5.3)
SODIUM SERPL-SCNC: 139 MMOL/L (ref 133–144)
TRIGL SERPL-MCNC: 271 MG/DL

## 2019-08-30 PROCEDURE — 80061 LIPID PANEL: CPT | Performed by: FAMILY MEDICINE

## 2019-08-30 PROCEDURE — 36415 COLL VENOUS BLD VENIPUNCTURE: CPT | Performed by: FAMILY MEDICINE

## 2019-08-30 PROCEDURE — 80048 BASIC METABOLIC PNL TOTAL CA: CPT | Performed by: FAMILY MEDICINE

## 2019-08-30 PROCEDURE — 99396 PREV VISIT EST AGE 40-64: CPT | Performed by: FAMILY MEDICINE

## 2019-08-30 RX ORDER — SCOLOPAMINE TRANSDERMAL SYSTEM 1 MG/1
PATCH, EXTENDED RELEASE TRANSDERMAL
Qty: 6 PATCH | Refills: 2 | Status: SHIPPED | OUTPATIENT
Start: 2019-08-30 | End: 2023-06-29

## 2019-08-30 RX ORDER — ONDANSETRON 4 MG/1
TABLET, ORALLY DISINTEGRATING ORAL
Qty: 20 TABLET | Refills: 0 | Status: SHIPPED | OUTPATIENT
Start: 2019-08-30 | End: 2019-09-29

## 2019-08-30 RX ORDER — LORAZEPAM 0.5 MG/1
0.25-0.5 TABLET ORAL EVERY 8 HOURS PRN
Qty: 20 TABLET | Refills: 1 | Status: SHIPPED | OUTPATIENT
Start: 2019-08-30 | End: 2020-01-17

## 2019-08-30 ASSESSMENT — ENCOUNTER SYMPTOMS
FREQUENCY: 0
BREAST MASS: 0
COUGH: 0
CHILLS: 0
NERVOUS/ANXIOUS: 0
HEARTBURN: 0
MYALGIAS: 0
ARTHRALGIAS: 0
ABDOMINAL PAIN: 0
FEVER: 0
PARESTHESIAS: 0
SORE THROAT: 0
PALPITATIONS: 0
HEMATOCHEZIA: 0
JOINT SWELLING: 0
CONSTIPATION: 0
NAUSEA: 0
DYSURIA: 0
SHORTNESS OF BREATH: 0
DIARRHEA: 0
HEADACHES: 0
EYE PAIN: 0
HEMATURIA: 0
WEAKNESS: 0
DIZZINESS: 0

## 2019-08-30 ASSESSMENT — MIFFLIN-ST. JEOR: SCORE: 1461.74

## 2019-08-30 NOTE — PROGRESS NOTES
"   SUBJECTIVE:   CC: Margie Becker is an 49 year old woman who presents for preventive health visit.     Healthy Habits:     Getting at least 3 servings of Calcium per day:  Yes    Bi-annual eye exam:  Yes    Dental care twice a year:  Yes    Sleep apnea or symptoms of sleep apnea:  None    Diet:  Low salt    Frequency of exercise:  2-3 days/week    Duration of exercise:  15-30 minutes    Taking medications regularly:  Yes    Medication side effects:  None    PHQ-2 Total Score: 0    Additional concerns today:  No    Menstruation/Pre-Menopause  Patient notes feeling \"hot\" and anxious post-menstrual cycle.   Her mother began menopause at age 52. Her mother also experienced hot flashes, and was short-tempered with little patience for about a year.     Hypertension  BP was 134/70 today. BP is well maintained with Losartan. Patient notes no negative side-effects.     Migraines  Patient discontinued use of MAXALT, notes that migraines may have been allergy linked. She no longer experiences migraines if she takes a regular antihistamine.     Travel/Flight Anxiety  Patient has travel/flight anxiety. She takes scopolamine patches, Vistaril, Zofran, and Ativan as needed.     Weight Maintenance   Patient has considered intermittent fasting. She has been working on eating healthy and leading a healthy lifestyle.     SKIN  Patient burned her right upper forearm while cooking. She is caring for it with soap and water, and a bandage.     Today's PHQ-2 Score:   PHQ-2 ( 1999 Pfizer) 8/30/2019   Q1: Little interest or pleasure in doing things 0   Q2: Feeling down, depressed or hopeless 0   PHQ-2 Score 0   Q1: Little interest or pleasure in doing things Not at all   Q2: Feeling down, depressed or hopeless Not at all   PHQ-2 Score 0       Abuse: Current or Past(Physical, Sexual or Emotional)- No  Do you feel safe in your environment? Yes    Social History     Tobacco Use     Smoking status: Never Smoker     Smokeless tobacco: " Never Used   Substance Use Topics     Alcohol use: No     If you drink alcohol do you typically have >3 drinks per day or >7 drinks per week? No    Alcohol Use 2019   Prescreen: >3 drinks/day or >7 drinks/week? No   Prescreen: >3 drinks/day or >7 drinks/week? -   No flowsheet data found.    Reviewed orders with patient.  Reviewed health maintenance and updated orders accordingly - Yes  BP Readings from Last 3 Encounters:   19 134/70   19 153/87   19 122/84    Wt Readings from Last 3 Encounters:   19 83.2 kg (183 lb 6.4 oz)   19 84.9 kg (187 lb 3.2 oz)   19 84.6 kg (186 lb 9.6 oz)                    Mammogram Screening: Patient under age 50, mutual decision reflected in health maintenance. Patient to schedule a mammogram.       Pertinent mammograms are reviewed under the imaging tab.  History of abnormal Pap smear: NO - age 30-65 PAP every 5 years with negative HPV co-testing recommended  PAP / HPV Latest Ref Rng & Units 10/20/2017 1/3/2014 2011   PAP - NIL NIL NIL   HPV 16 DNA NEG:Negative Negative - -   HPV 18 DNA NEG:Negative Negative - -   OTHER HR HPV NEG:Negative Negative - -     Reviewed and updated as needed this visit by clinical staff  Tobacco  Allergies  Meds  Med Hx  Surg Hx  Fam Hx  Soc Hx        Reviewed and updated as needed this visit by Provider        Past Medical History:   Diagnosis Date     Allergic rhinitis, cause unspecified      Anemia      Depressive disorder, not elsewhere classified      Esophageal reflux      Fear of travel with panic attacks 2010     Hx of previous reproductive problem     ivf pregnancy     Migraines      Personal history of urinary calculi      PONV (postoperative nausea and vomiting)       Past Surgical History:   Procedure Laterality Date      SECTION  2013    Procedure:  SECTION;   Section;  Surgeon: Lola Pérez MD;  Location:  L+D     DILATION AND CURETTAGE, OPERATIVE  HYSTEROSCOPY, COMBINED  2011    Procedure:COMBINED DILATION AND CURETTAGE, OPERATIVE HYSTEROSCOPY; Removal Endometrial Polyp; Surgeon:ARIS FUENTES; Location:[facility] OR     HERNIA REPAIR  1975    Inguinal     MYOMECTOMY UTERUS  2010     MYOMECTOMY UTERUS       RHINOPLASTY       SURGICAL HISTORY OF -       rhinoplasty     OB History    Para Term  AB Living   1 1 0 1 0 2   SAB TAB Ectopic Multiple Live Births   0 0 0 1 2      # Outcome Date GA Lbr Humphrey/2nd Weight Sex Delivery Anes PTL Lv   1A  13 35w4d  2.325 kg (5 lb 2 oz) F CS-LTranv Spinal  SHAY      Name: ROSA,G1 MICAH      Apgar1: 8  Apgar5: 9   1B  13 35w4d  2.523 kg (5 lb 9 oz) F  Spinal  SHAY      Name: JAYSON LEE MICAH      Apgar1: 8  Apgar5: 9       Review of Systems   Constitutional: Negative for chills and fever.   HENT: Negative for congestion, ear pain, hearing loss and sore throat.    Eyes: Negative for pain and visual disturbance.   Respiratory: Negative for cough and shortness of breath.    Cardiovascular: Negative for chest pain, palpitations and peripheral edema.   Gastrointestinal: Negative for abdominal pain, constipation, diarrhea, heartburn, hematochezia and nausea.   Breasts:  Negative for tenderness, breast mass and discharge.   Genitourinary: Negative for dysuria, frequency, genital sores, hematuria, pelvic pain, urgency, vaginal bleeding and vaginal discharge.   Musculoskeletal: Negative for arthralgias, joint swelling and myalgias.   Skin: Negative for rash.   Neurological: Negative for dizziness, weakness, headaches and paresthesias.   Psychiatric/Behavioral: Negative for mood changes. The patient is not nervous/anxious.         This document serves as a record of the services and decisions personally performed and made by Cheryl Augustin DO. It was created on her behalf by Feroz Rivera, a trained medical scribe. The creation of this document is based on the provider's statements to the medical  "jess Rivera August 30, 2019 12:39 PM    OBJECTIVE:   /70 (BP Location: Right arm, Patient Position: Chair, Cuff Size: Adult Regular)   Pulse 76   Temp 98.2  F (36.8  C) (Oral)   Ht 1.657 m (5' 5.25\")   Wt 83.2 kg (183 lb 6.4 oz)   LMP 08/23/2019 (Exact Date)   Breastfeeding? No   BMI 30.29 kg/m    Physical Exam  GENERAL: healthy, alert and no distress  HENT: ear canals and TM's normal, nose and mouth without ulcers or lesions  NECK: no adenopathy, no asymmetry, masses, or scars and thyroid normal to palpation  RESP: lungs clear to auscultation - no rales, rhonchi or wheezes  BREAST: normal without masses, tenderness or nipple discharge and no palpable axillary masses or adenopathy  CV: regular rate and rhythm, normal S1 S2, no S3 or S4, no murmur, click or rub, no peripheral edema and peripheral pulses strong  ABDOMEN: soft, nontender, no hepatosplenomegaly, no masses and bowel sounds normal  MS: no gross musculoskeletal defects noted, no edema  SKIN: no suspicious rashes; Right upper forearm: 5 cm by 3 cm, rectangular burn   NEURO: Normal strength and tone, mentation intact and speech normal  PSYCH: mentation appears normal, affect normal/bright      Diagnostic Test Results:  Labs reviewed in Epic  No results found for this or any previous visit (from the past 24 hour(s)).    ASSESSMENT/PLAN:     (Z00.00) Routine general medical examination at a health care facility  (primary encounter diagnosis)  Comment: Health Maintenance    (T75.3XXA) Motion sickness, initial encounter  Comment: related to flying and boats, buses. Condition is well maintained with medication.   Plan: scopolamine (TRANSDERM) 1 MG/3DAYS 72 hr patch     I have renewed her prescription. Patient is to take as needed and follow-up.     (R11.0) Nausea  Comment: Condition is well maintained with medication.   Plan: ondansetron (ZOFRAN-ODT) 4 MG ODT tab        Patient is to take as needed and follow-up if necessary.     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 The condition is well maintained with medication.  Plan: LORazepam (ATIVAN) 0.5 MG tablet        I have renewed her prescription. She is to take as needed and follow-up.     (I10) Hypertension goal BP (blood pressure) < 140/90  Comment: BP was 134/70 today. Condition is well maintained with Losartan.   Plan: Basic metabolic panel  (Ca, Cl, CO2, Creat,         Gluc, K, Na, BUN)        Labs were ordered. I will follow-up with the patient as needed.     (Z13.6) CARDIOVASCULAR SCREENING; LDL GOAL LESS THAN 130  Comment: Labs were ordered. I will follow-up with the patient as needed.   Plan: Lipid panel reflex to direct LDL Non-fasting            COUNSELING:  Reviewed preventive health counseling, as reflected in patient instructions       Regular exercise       Healthy diet/nutrition       Colon cancer screening    Estimated body mass index is 30.29 kg/m  as calculated from the following:    Height as of this encounter: 1.657 m (5' 5.25\").    Weight as of this encounter: 83.2 kg (183 lb 6.4 oz).         reports that she has never smoked. She has never used smokeless tobacco.      Counseling Resources:  ATP IV Guidelines  Pooled Cohorts Equation Calculator  Breast Cancer Risk Calculator  FRAX Risk Assessment  ICSI Preventive Guidelines  Dietary Guidelines for Americans, 2010  USDA's MyPlate  ASA Prophylaxis  Lung CA Screening    The information in this document, created by the medical scribe, Feroz Rivera, for me, accurately reflects the services I personally performed and the decisions made by me. I have reviewed and approved this document for accuracy prior to leaving the patient care area.    Cheryl Augustin,   [facility]  "

## 2019-08-30 NOTE — PATIENT INSTRUCTIONS
We discussed you looking into intermittent fasting.     We will look at your cholesterol today.     You will have to look into scheduling a colon cancer screening after your 50th birthday.     Preventive Health Recommendations  Female Ages 40 to 49    Yearly exam:     See your health care provider every year in order to  1. Review health changes.   2. Discuss preventive care.    3. Review your medicines if your doctor prescribed any.      Get a Pap test every three years (unless you have an abnormal result and your provider advises testing more often).      If you get Pap tests with HPV test, you only need to test every 5 years, unless you have an abnormal result. You do not need a Pap test if your uterus was removed (hysterectomy) and you have not had cancer.      You should be tested each year for STDs (sexually transmitted diseases), if you're at risk.     Ask your doctor if you should have a mammogram.      Have a colonoscopy (test for colon cancer) if someone in your family has had colon cancer or polyps before age 50.       Have a cholesterol test every 5 years.       Have a diabetes test (fasting glucose) after age 45. If you are at risk for diabetes, you should have this test every 3 years.    Shots: Get a flu shot each year. Get a tetanus shot every 10 years.     Nutrition:     Eat at least 5 servings of fruits and vegetables each day.    Eat whole-grain bread, whole-wheat pasta and brown rice instead of white grains and rice.    Get adequate Calcium and Vitamin D.      Lifestyle    Exercise at least 150 minutes a week (an average of 30 minutes a day, 5 days a week). This will help you control your weight and prevent disease.    Limit alcohol to one drink per day.    No smoking.     Wear sunscreen to prevent skin cancer.    See your dentist every six months for an exam and cleaning.    [facility]   Discharged by : Suzanne CANAS Certified Medical Assistant (AAMA)   Paper scripts provided to  patient : 2   If you have any questions regarding to your visit please contact your care team:     Team Silver              Clinic Hours Telephone Number     Dr. Cheryl Alaniz PA-C     7am-7pm  Monday - Thursday   7am-5pm  Fridays  [phone]   (Appointment scheduling available 24/7)     RN Line  [phone] option 2     Urgent Care - [place] and [place] - 11am-9pm Monday-Friday Saturday-Sunday- 9am-5pm     [place] -   5pm-9pm Monday-Friday Saturday-Sunday- 9am-5pm    [phone] - [place]    [phone] - [place]     For a Price Quote for your services, please call our Consumer Price Line at [phone].     What options do I have for visits at the clinic other than the traditional office visit?     To expand how we care for you, many of our providers are utilizing electronic visits (e-visits) and telephone visits, when medically appropriate, for interactions with their patients rather than a visit in the clinic. We also offer nurse visits for many medical concerns. Just like any other service, we will bill your insurance company for this type of visit based on time spent on the phone with your provider. Not all insurance companies cover these visits. Please check with your medical insurance if this type of visit is covered. You will be responsible for any charges that are not paid by your insurance.   E-visits via "[employer]: generally incur a $45.00 fee.     Telephone visits:  Time spent on the phone: *charged based on time that is spent on the phone in increments of 10 minutes. Estimated cost:   5-10 mins $30.00   11-20 mins. $59.00   21-30 mins. $85.00       Use [employer] (secure email communication and access to your chart) to send your primary care provider a message or make an appointment. Ask someone on your Team how to sign up for "[employer].   As always, Thank you for trusting us with your health care needs!    [facility]  Services:    Scheduling Appointments  Erum Montes, [facility]  Call: [phone]    [facility], [place], [facility]  Call: [phone]    [facility]  Call: [phone]    For Gastroenterology referrals   [facility]   Clinics and Surgery Center, 4th Floor   [address] [place] [address]   Appointments: [phone]    WHERE TO GO FOR CARE?  Clinic    Make an appointment if you:       Are sick (cold, cough, flu, sore throat, earache or in pain).       Have a small injury (sprain, small cut, burn or broken bone).       Need a physical exam, Pap smear, vaccine or prescription refill.       Have questions about your health or medicines.    To reach us:      Call [phone] ([phone]). Open 24 hours every day. (For counseling services, call [phone].)    Log into [employer] at [employer]. (Visit [employer] to create an account.) Hospital emergency room    An emergency is a serious or life- threatening problem that must be treated right away.    Call 621 or get to the hospital if you have:      Very bad or sudden:            - Chest pain or pressure         - Bleeding         - Head or belly pain         - Dizziness or trouble seeing, walking or                          Speaking      Problems breathing      Blood in your vomit or you are coughing up blood      A major injury (knocked out, loss of a finger or limb, rape, broken bone protruding from skin)    A mental health crisis. (Or call the Mental Health Crisis line at 1-893.163.7804 or Suicide Prevention Hotline at 1-343.581.7891.)    Open 24 hours every day. You don't need an appointment.     Urgent care    Visit urgent care for sickness or small injuries when the clinic is closed. You don't need an appointment. To check hours or find an urgent care near you, visit [email or web address]. Online care    Get online care from [facility] for more than 70 common problems, like colds,  allergies and infections. Open 24 hours every day at:   [email or web address]   Need help deciding?    For advice about where to be seen, you may call your clinic and ask to speak with a nurse. We're here for you 24 hours every day.         If you are deaf or hard of hearing, please let us know. We provide many free services including sign language interpreters, oral interpreters, TTYs, telephone amplifiers, note takers and written materials.

## 2019-09-29 DIAGNOSIS — R11.0 NAUSEA: ICD-10-CM

## 2019-09-30 NOTE — TELEPHONE ENCOUNTER
"Requested Prescriptions   Pending Prescriptions Disp Refills     ondansetron (ZOFRAN-ODT) 4 MG ODT tab [Pharmacy Med Name: ONDANSETRON 4MG TBDP]  Last Written Prescription Date:  8/30/2019  Last Fill Quantity: 20 tablet,  # refills: 0   Last office visit: 8/30/2019 with prescribing provider:  SHANE Augustin   Future Office Visit:     20 tablet 0     Sig: DISSOLVE ONE TO TWO TABLETS ON TONGUE EVERY 8 HOURS AS NEEDED FOR NAUSEA        Antivertigo/Antiemetic Agents Passed - 9/29/2019  4:03 PM        Passed - Recent (12 mo) or future (30 days) visit within the authorizing provider's specialty     Patient has had an office visit with the authorizing provider or a provider within the authorizing providers department within the previous 12 mos or has a future within next 30 days. See \"Patient Info\" tab in inbasket, or \"Choose Columns\" in Meds & Orders section of the refill encounter.              Passed - Medication is active on med list        Passed - Patient is 18 years of age or older          "

## 2019-10-02 RX ORDER — ONDANSETRON 4 MG/1
TABLET, ORALLY DISINTEGRATING ORAL
Qty: 20 TABLET | Refills: 0 | Status: SHIPPED | OUTPATIENT
Start: 2019-10-02 | End: 2019-12-08

## 2019-10-02 NOTE — TELEPHONE ENCOUNTER
Prescription approved per [facility] Refill Protocol.  Bacilio Anguiano RN

## 2019-10-04 ENCOUNTER — HEALTH MAINTENANCE LETTER (OUTPATIENT)
Age: 49
End: 2019-10-04

## 2019-12-08 DIAGNOSIS — R11.0 NAUSEA: ICD-10-CM

## 2019-12-09 RX ORDER — ONDANSETRON 4 MG/1
TABLET, ORALLY DISINTEGRATING ORAL
Qty: 20 TABLET | Refills: 0 | Status: SHIPPED | OUTPATIENT
Start: 2019-12-09 | End: 2020-08-11

## 2019-12-09 NOTE — TELEPHONE ENCOUNTER
"Requested Prescriptions   Pending Prescriptions Disp Refills     ondansetron (ZOFRAN-ODT) 4 MG ODT tab [Pharmacy Med Name: ONDANSETRON 4MG TBDP]  Last Written Prescription Date:  10/2/2019  Last Fill Quantity: 20 tablet,  # refills: 0   Last office visit: 8/30/2019 with prescribing provider:  SHANE Augustin   Future Office Visit:   20 tablet 0     Sig: DISSOLVE ONE TO TWO TABLETS ON TONGUE EVERY 8 HOURS AS NEEDED FOR NAUSEA        Antivertigo/Antiemetic Agents Passed - 12/8/2019  6:53 PM        Passed - Recent (12 mo) or future (30 days) visit within the authorizing provider's specialty     Patient has had an office visit with the authorizing provider or a provider within the authorizing providers department within the previous 12 mos or has a future within next 30 days. See \"Patient Info\" tab in inbasket, or \"Choose Columns\" in Meds & Orders section of the refill encounter.              Passed - Medication is active on med list        Passed - Patient is 18 years of age or older        "

## 2019-12-10 NOTE — TELEPHONE ENCOUNTER
Prescription approved per [facility] [place].    Venita Parks, RN, BSN, PHN  [employer]: [place]

## 2020-01-16 DIAGNOSIS — F40.01 FEAR OF TRAVEL WITH PANIC ATTACKS: ICD-10-CM

## 2020-01-16 NOTE — TELEPHONE ENCOUNTER
Requested Prescriptions   Pending Prescriptions Disp Refills     LORazepam (ATIVAN) 0.5 MG tablet 20 tablet 1     Sig: Take 0.5-1 tablets (0.25-0.5 mg) by mouth every 8 hours as needed for anxiety Take 30 minutes prior to departure.  Do not operate a vehicle after taking this medication         Last Written Prescription Date:  8/30/2019  Last Fill Quantity: 20 tabs,   # refills: 1  Last Office Visit: 8/30/2019  Future Office visit:       Routing refill request to provider for review/approval because:  Drug not on the G, P or [facility] refill protocol or controlled substance

## 2020-01-17 RX ORDER — LORAZEPAM 0.5 MG/1
0.25-0.5 TABLET ORAL EVERY 8 HOURS PRN
Qty: 20 TABLET | Refills: 0 | Status: SHIPPED | OUTPATIENT
Start: 2020-01-17 | End: 2021-03-02

## 2020-01-17 NOTE — TELEPHONE ENCOUNTER
Prescription printed due to long Sigg instructions.  I have put it in TC outgoing box.    Cheryl Augustin DO

## 2020-02-02 DIAGNOSIS — I10 HYPERTENSION GOAL BP (BLOOD PRESSURE) < 140/90: ICD-10-CM

## 2020-02-03 NOTE — TELEPHONE ENCOUNTER
"Requested Prescriptions   Pending Prescriptions Disp Refills     losartan (COZAAR) 100 MG tablet [Pharmacy Med Name: LOSARTAN POTASSIUM 100MG TABS]  Last Written Prescription Date:  8/8/2019  Last Fill Quantity: 90 tablet,  # refills: 1   Last office visit: 8/30/2019 with prescribing provider:  SHANE Augustin   Future Office Visit:   Next 5 appointments (look out 90 days)    Feb 04, 2020 12:40 PM CST  Lilliana Moran with Cheryl Augustin DO  [facility] ([address] [id number] [phone]        90 tablet 1     Sig: TAKE ONE TABLET BY MOUTH ONCE DAILY       Angiotensin-II Receptors Passed - 2/2/2020 10:37 AM        Passed - Last blood pressure under 140/90 in past 12 months     BP Readings from Last 3 Encounters:   08/30/19 134/70   02/28/19 153/87   01/11/19 122/84                 Passed - Recent (12 mo) or future (30 days) visit within the authorizing provider's specialty     Patient has had an office visit with the authorizing provider or a provider within the authorizing providers department within the previous 12 mos or has a future within next 30 days. See \"Patient Info\" tab in inbasket, or \"Choose Columns\" in Meds & Orders section of the refill encounter.              Passed - Medication is active on med list        Passed - Patient is age 18 or older        Passed - No active pregnancy on record        Passed - Normal serum creatinine on file in past 12 months     Recent Labs   Lab Test 08/30/19  1303   CR 0.94             Passed - Normal serum potassium on file in past 12 months     Recent Labs   Lab Test 08/30/19  1303   POTASSIUM 4.2                    Passed - No positive pregnancy test in past 12 months        "

## 2020-02-04 ENCOUNTER — OFFICE VISIT (OUTPATIENT)
Dept: FAMILY MEDICINE | Facility: CLINIC | Age: 50
End: 2020-02-04
Payer: COMMERCIAL

## 2020-02-04 VITALS
BODY MASS INDEX: 31.22 KG/M2 | DIASTOLIC BLOOD PRESSURE: 76 MMHG | WEIGHT: 187.4 LBS | HEART RATE: 80 BPM | TEMPERATURE: 98 F | HEIGHT: 65 IN | SYSTOLIC BLOOD PRESSURE: 126 MMHG

## 2020-02-04 DIAGNOSIS — J98.01 POST-INFECTION BRONCHOSPASM: ICD-10-CM

## 2020-02-04 DIAGNOSIS — I10 HYPERTENSION GOAL BP (BLOOD PRESSURE) < 140/90: Primary | ICD-10-CM

## 2020-02-04 DIAGNOSIS — F41.9 ANXIETY: ICD-10-CM

## 2020-02-04 DIAGNOSIS — Z12.31 ENCOUNTER FOR SCREENING MAMMOGRAM FOR BREAST CANCER: ICD-10-CM

## 2020-02-04 DIAGNOSIS — N95.1 PERIMENOPAUSAL: ICD-10-CM

## 2020-02-04 DIAGNOSIS — R50.9 FEVER, UNSPECIFIED FEVER CAUSE: ICD-10-CM

## 2020-02-04 PROCEDURE — 36415 COLL VENOUS BLD VENIPUNCTURE: CPT | Performed by: FAMILY MEDICINE

## 2020-02-04 PROCEDURE — 99214 OFFICE O/P EST MOD 30 MIN: CPT | Performed by: FAMILY MEDICINE

## 2020-02-04 PROCEDURE — 80048 BASIC METABOLIC PNL TOTAL CA: CPT | Performed by: FAMILY MEDICINE

## 2020-02-04 RX ORDER — FLUTICASONE PROPIONATE 110 UG/1
1 AEROSOL, METERED RESPIRATORY (INHALATION) 2 TIMES DAILY
Qty: 1 INHALER | Refills: 0 | Status: SHIPPED | OUTPATIENT
Start: 2020-02-04 | End: 2021-03-02

## 2020-02-04 RX ORDER — LOSARTAN POTASSIUM 100 MG/1
TABLET ORAL
Qty: 90 TABLET | Refills: 1 | OUTPATIENT
Start: 2020-02-04

## 2020-02-04 RX ORDER — LOSARTAN POTASSIUM 100 MG/1
100 TABLET ORAL DAILY
Qty: 90 TABLET | Refills: 1 | Status: SHIPPED | OUTPATIENT
Start: 2020-02-04 | End: 2020-08-20

## 2020-02-04 RX ORDER — FLUOXETINE 10 MG/1
10 CAPSULE ORAL DAILY
Qty: 14 CAPSULE | Refills: 0 | Status: SHIPPED | OUTPATIENT
Start: 2020-02-04 | End: 2021-03-02 | Stop reason: DRUGHIGH

## 2020-02-04 ASSESSMENT — PATIENT HEALTH QUESTIONNAIRE - PHQ9: SUM OF ALL RESPONSES TO PHQ QUESTIONS 1-9: 3

## 2020-02-04 ASSESSMENT — MIFFLIN-ST. JEOR: SCORE: 1479.88

## 2020-02-04 NOTE — PROGRESS NOTES
Subjective     Margie Becker is a 49 year old female who presents to clinic today for the following health issues:    HPI     Hypertension Follow-up      Do you check your blood pressure regularly outside of the clinic? Yes, but hasn't checked for several months    Are you following a low salt diet? Yes    Are your blood pressures ever more than 140 on the top number (systolic) OR more   than 90 on the bottom number (diastolic), for example 140/90? Yes      How many servings of fruits and vegetables do you eat daily?  4 or more    On average, how many sweetened beverages do you drink each day (Examples: soda, juice, sweet tea, etc.  Do NOT count diet or artificially sweetened beverages)?   0    How many days per week do you exercise enough to make your heart beat faster? 4    How many minutes a day do you exercise enough to make your heart beat faster? 30 - 60    How many days per week do you miss taking your medication? 0    Acute Illness   Acute illness concerns: Cough  Onset: 2 weeks ago    Fever: YES, highest 101 last week    Chills/Sweats: YES- chills last week    Headache (location?): no     Sinus Pressure:YES    Conjunctivitis:  no    Ear Pain: no    Rhinorrhea: YES    Congestion: YES    Sore Throat: no      Cough: YES, coughing fits    Wheeze: no    Decreased Appetite: no     Nausea: YES, last week    Vomiting: no     Diarrhea:  no     Dysuria/Freq.: YES- incontinence     Fatigue/Achiness: no    Sick/Strep Exposure: YES, daughters     Therapies Tried and outcome: Patient had Dayquil, cough drops, and ibuprofen    She has had a high fever twice but her last fever was over a week ago. Went to a [facility] 12/26. She was told she had a sinus infection and a mild case of bronchitis so she was given antibiotics which worked well. Before she had a lot of sinus pressure, and green phlegm but these have not happened this time. Blood pressure was high when she went to the [facility] as well.     Concern  "- Perimenopause  Onset: ongoing for 6 months     Description:   Patient is having hot flashes, irritability, irregular menses, bladder incontinence, and anxiety.     Intensity: patient says it depends, moderate     Progression of Symptoms:  varies    Accompanying Signs & Symptoms:  Hot flashes, irritable, irregular menses, incontinence, anxiety    Previous history of similar problem:   No    Precipitating factors:   Worsened by: nothing    Alleviating factors:  Improved by: nothing    Therapies Tried and outcome: nothing    She is unable to determine a pattern with symptoms. Sometimes symptoms occur around her period and sometimes weeks after. She believes she is becoming more of an irritable and impatient person in general.     She had a rash for about 2-3 days with Lexapro in the past. She was on Paxil in the past and had a hard time coming off of it.       BP Readings from Last 3 Encounters:   02/04/20 126/76   08/30/19 134/70   02/28/19 153/87    Wt Readings from Last 3 Encounters:   02/04/20 85 kg (187 lb 6.4 oz)   08/30/19 83.2 kg (183 lb 6.4 oz)   04/17/19 84.9 kg (187 lb 3.2 oz)           Reviewed and updated as needed this visit by Provider         Review of Systems   ROS COMP: Constitutional, HEENT, cardiovascular, pulmonary, gi and gu systems are negative, except as otherwise noted.    This document serves as a record of the services and decisions personally performed and made by Cheryl Augustin DO. It was created on her  behalf by Suzanne Sena, a trained medical scribe. The creation of this document is based on the provider's statements to the medical scribe.  Suzanne Sena 1:01 PM February 4, 2020         Objective    /76 (Patient Position: Sitting, Cuff Size: Adult Regular)   Pulse 80   Temp 98  F (36.7  C) (Oral)   Ht 1.657 m (5' 5.25\")   Wt 85 kg (187 lb 6.4 oz)   LMP 01/05/2020 (Exact Date)   Breastfeeding No   BMI 30.95 kg/m    Body mass index is 30.95 kg/m .     Physical Exam   GENERAL: " healthy, alert and no distress  HENT: ear canals and TM's normal, nose and mouth without ulcers or lesions  NECK: no adenopathy, no asymmetry, masses, or scars and thyroid normal to palpation  RESP: lungs clear to auscultation - no rales, rhonchi or wheezes  CV: regular rate and rhythm, normal S1 S2, no S3 or S4, no murmur, click or rub  PSYCH: mentation appears normal, affect normal/bright    Diagnostic Test Results:  Labs reviewed in Epic  No results found for this or any previous visit (from the past 24 hour(s)).          ICD-10-CM    1. Hypertension goal BP (blood pressure) < 140/90 I10 losartan (COZAAR) 100 MG tablet     Basic metabolic panel   2. Fever, unspecified fever cause R50.9    3. Perimenopausal N95.1    4. Encounter for screening mammogram for breast cancer Z12.31 MA SCREENING DIGITAL BILAT - Future  (s+30)   5. Anxiety F41.9 FLUoxetine (PROZAC) 10 MG capsule     FLUoxetine (PROZAC) 20 MG capsule   6. Post-infection bronchospasm J98.01 fluticasone (FLOVENT HFA) 110 MCG/ACT inhaler   Blood pressure was elevated, continue current medication. Patient will start Prozac for perimenopausal symptoms and will follow up with me in a month to see if improved mood has lowered blood pressure. If blood pressure still elevated in a month we will consider changing medications. She will use an inhaler for a week for bronchospasm.     The information in this document, created by the medical scribe for me, accurately reflects the services I personally performed and the decisions made by me. I have reviewed and approved this document for accuracy prior to leaving the patient care area.  February 4, 2020 1:24 PM

## 2020-02-04 NOTE — PATIENT INSTRUCTIONS
Prozac can be helpful for both hot flashes and irritability. I want you to start 10 mg Prozac for a few weeks and then start 20 mg later. If you get a rash, stop taking it.    I would like to see you in about a month to see if an improved mood on Prozac lowered your blood pressure. Try to check blood pressures at home more often in the next month.     Get your mammogram done.     Start the inhaler 1 puff twice a day. Make sure to rinse your mouth out with water after.

## 2020-02-04 NOTE — TELEPHONE ENCOUNTER
"Refills were sent to requesting pharmacy today at office visit.    \"Refusal\" routed back to pharmacy with note.    Katharina Figueroa RN  [facility]      "

## 2020-02-05 LAB
ANION GAP SERPL CALCULATED.3IONS-SCNC: 9 MMOL/L (ref 3–14)
BUN SERPL-MCNC: 11 MG/DL (ref 7–30)
CALCIUM SERPL-MCNC: 8.7 MG/DL (ref 8.5–10.1)
CHLORIDE SERPL-SCNC: 105 MMOL/L (ref 94–109)
CO2 SERPL-SCNC: 23 MMOL/L (ref 20–32)
CREAT SERPL-MCNC: 0.81 MG/DL (ref 0.52–1.04)
GFR SERPL CREATININE-BSD FRML MDRD: 85 ML/MIN/{1.73_M2}
GLUCOSE SERPL-MCNC: 81 MG/DL (ref 70–99)
POTASSIUM SERPL-SCNC: 3.9 MMOL/L (ref 3.4–5.3)
SODIUM SERPL-SCNC: 137 MMOL/L (ref 133–144)

## 2020-04-07 ENCOUNTER — MYC REFILL (OUTPATIENT)
Dept: FAMILY MEDICINE | Facility: CLINIC | Age: 50
End: 2020-04-07

## 2020-04-07 DIAGNOSIS — F41.9 ANXIETY: ICD-10-CM

## 2020-04-07 NOTE — TELEPHONE ENCOUNTER
"Requested Prescriptions   Pending Prescriptions Disp Refills     FLUoxetine (PROZAC) 20 MG capsule  Last Written Prescription Date:  2/4/2020  Last Fill Quantity: 30 caps,  # refills: 1   Last office visit: 2/4/2020 with prescribing provider:  Charli   Future Office Visit:   30 capsule 1     Sig: Take 1 capsule (20 mg) by mouth daily       SSRIs Protocol Passed - 4/7/2020  1:10 PM        Passed - Recent (12 mo) or future (30 days) visit within the authorizing provider's specialty     Patient has had an office visit with the authorizing provider or a provider within the authorizing providers department within the previous 12 mos or has a future within next 30 days. See \"Patient Info\" tab in inbasket, or \"Choose Columns\" in Meds & Orders section of the refill encounter.              Passed - Medication is active on med list        Passed - Patient is age 18 or older        Passed - No active pregnancy on record        Passed - No positive pregnancy test in last 12 months            "

## 2020-04-07 NOTE — TELEPHONE ENCOUNTER
"Requested Prescriptions   Pending Prescriptions Disp Refills     FLUoxetine (PROZAC) 20 MG capsule [Pharmacy Med Name: FLUOXETINE HCL 20MG CAPS]  Last Written Prescription Date:  2/4/2020  Last Fill Quantity: 30 caps,  # refills: 1   Last office visit: 2/4/2020 with prescribing provider:  Charli   Future Office Visit:   30 capsule 1     Sig: TAKE ONE CAPSULE BY MOUTH ONCE DAILY       SSRIs Protocol Passed - 4/7/2020  1:06 PM        Passed - Recent (12 mo) or future (30 days) visit within the authorizing provider's specialty     Patient has had an office visit with the authorizing provider or a provider within the authorizing providers department within the previous 12 mos or has a future within next 30 days. See \"Patient Info\" tab in inbasket, or \"Choose Columns\" in Meds & Orders section of the refill encounter.              Passed - Medication is active on med list        Passed - Patient is age 18 or older        Passed - No active pregnancy on record        Passed - No positive pregnancy test in last 12 months            "

## 2020-04-09 NOTE — TELEPHONE ENCOUNTER
"Requested Prescriptions   Pending Prescriptions Disp Refills     FLUoxetine (PROZAC) 20 MG capsule [Pharmacy Med Name: FLUOXETINE HCL 20MG CAPS] 30 capsule 1     Sig: TAKE ONE CAPSULE BY MOUTH ONCE DAILY       SSRIs Protocol Passed - 4/7/2020  2:31 PM        Passed - Recent (12 mo) or future (30 days) visit within the authorizing provider's specialty     Patient has had an office visit with the authorizing provider or a provider within the authorizing providers department within the previous 12 mos or has a future within next 30 days. See \"Patient Info\" tab in inbasket, or \"Choose Columns\" in Meds & Orders section of the refill encounter.              Passed - Medication is active on med list        Passed - Patient is age 18 or older        Passed - No active pregnancy on record        Passed - No positive pregnancy test in last 12 months           Rx approved per [facility] protocol.      Kaity Coughlin RN BSN, Pap Tracking    "

## 2020-05-09 DIAGNOSIS — F40.01 FEAR OF TRAVEL WITH PANIC ATTACKS: ICD-10-CM

## 2020-05-11 RX ORDER — HYDROXYZINE PAMOATE 25 MG/1
CAPSULE ORAL
Qty: 20 CAPSULE | Refills: 1 | Status: SHIPPED | OUTPATIENT
Start: 2020-05-11 | End: 2021-06-03

## 2020-05-11 NOTE — TELEPHONE ENCOUNTER
"Requested Prescriptions   Pending Prescriptions Disp Refills     hydrOXYzine (VISTARIL) 25 MG capsule [Pharmacy Med Name: hydrOXYzine 25MG PAMOATE CAP] 20 capsule 1     Sig: TAKE ONE CAPSULE BY MOUTH THREE TIMES A DAY AS NEEDED FOR ANXIETY       Antihistamines Protocol Passed - 5/9/2020  8:41 PM        Passed - Recent (12 mo) or future (30 days) visit within the authorizing provider's specialty     Patient has had an office visit with the authorizing provider or a provider within the authorizing providers department within the previous 12 mos or has a future within next 30 days. See \"Patient Info\" tab in inbasket, or \"Choose Columns\" in Meds & Orders section of the refill encounter.              Passed - Patient is age 3 or older     Apply age and/or weight-based dosing for peds patients age 3 and older.    Forward request to provider for patients under the age of 3.          Passed - Medication is active on med list           Prescription approved per [facility] Refill Protocol.  Lyndsey Walker RN    "

## 2020-06-17 DIAGNOSIS — F41.9 ANXIETY: ICD-10-CM

## 2020-06-18 NOTE — TELEPHONE ENCOUNTER
Patient was last seen 2/4/20.    Note that day:    Blood pressure was elevated, continue current medication. Patient will start Prozac for perimenopausal symptoms and will follow up with me in a month to see if improved mood has lowered blood pressure. If blood pressure still elevated in a month we will consider changing medications. She will use an inhaler for a week for bronchospasm.       Routed to provider, unsure of plan at this point (phone or in-person visit?).    Katharina Figueroa RN  [facility]

## 2020-08-09 DIAGNOSIS — R11.0 NAUSEA: ICD-10-CM

## 2020-08-11 RX ORDER — ONDANSETRON 4 MG/1
TABLET, ORALLY DISINTEGRATING ORAL
Qty: 20 TABLET | Refills: 2 | Status: SHIPPED | OUTPATIENT
Start: 2020-08-11 | End: 2021-06-03

## 2020-08-11 NOTE — TELEPHONE ENCOUNTER
"Requested Prescriptions   Pending Prescriptions Disp Refills     ondansetron (ZOFRAN-ODT) 4 MG ODT tab [Pharmacy Med Name: ONDANSETRON 4MG TBDP] 20 tablet 0     Sig: DISSOLVE ONE TO TWO TABLETS ON TONGUE EVERY 8 HOURS AS NEEDED FOR NAUSEA        Antivertigo/Antiemetic Agents Passed - 8/9/2020  3:53 PM        Passed - Recent (12 mo) or future (30 days) visit within the authorizing provider's specialty     Patient has had an office visit with the authorizing provider or a provider within the authorizing providers department within the previous 12 mos or has a future within next 30 days. See \"Patient Info\" tab in inbasket, or \"Choose Columns\" in Meds & Orders section of the refill encounter.              Passed - Medication is active on med list        Passed - Patient is 18 years of age or older           Prescription [place] per [facility] [place].    Justine Anderson RN    "

## 2020-08-20 DIAGNOSIS — I10 HYPERTENSION GOAL BP (BLOOD PRESSURE) < 140/90: ICD-10-CM

## 2020-08-20 RX ORDER — LOSARTAN POTASSIUM 100 MG/1
TABLET ORAL
Qty: 90 TABLET | Refills: 1 | Status: SHIPPED | OUTPATIENT
Start: 2020-08-20 | End: 2021-03-02

## 2020-08-20 NOTE — TELEPHONE ENCOUNTER
Prescription approved per [facility] Refill Protocol.    YAMIL BerumenN, RN  [facility]

## 2020-11-08 ENCOUNTER — HEALTH MAINTENANCE LETTER (OUTPATIENT)
Age: 50
End: 2020-11-08

## 2020-12-26 DIAGNOSIS — F41.9 ANXIETY: ICD-10-CM

## 2021-01-24 DIAGNOSIS — F41.9 ANXIETY: ICD-10-CM

## 2021-01-27 NOTE — TELEPHONE ENCOUNTER
Routing refill request to provider for review/approval because:  Maris given x1 and patient did not follow up, please advise    Venita Parks RN, BSN, PHN  [facility]: [place]

## 2021-02-23 ENCOUNTER — TELEPHONE (OUTPATIENT)
Dept: FAMILY MEDICINE | Facility: CLINIC | Age: 51
End: 2021-02-23

## 2021-02-23 DIAGNOSIS — F41.9 ANXIETY: ICD-10-CM

## 2021-02-25 NOTE — TELEPHONE ENCOUNTER
Please assist scheduling patient for a wellness exam  Pt was already given gerry fill 1/27/21        Deloris Beck RN

## 2021-03-02 ENCOUNTER — VIRTUAL VISIT (OUTPATIENT)
Dept: FAMILY MEDICINE | Facility: CLINIC | Age: 51
End: 2021-03-02
Payer: COMMERCIAL

## 2021-03-02 DIAGNOSIS — Z12.31 ENCOUNTER FOR SCREENING MAMMOGRAM FOR BREAST CANCER: ICD-10-CM

## 2021-03-02 DIAGNOSIS — N95.1 MENOPAUSAL SYNDROME (HOT FLASHES): ICD-10-CM

## 2021-03-02 DIAGNOSIS — F41.9 ANXIETY: Primary | ICD-10-CM

## 2021-03-02 DIAGNOSIS — Z12.11 SPECIAL SCREENING FOR MALIGNANT NEOPLASMS, COLON: ICD-10-CM

## 2021-03-02 DIAGNOSIS — I10 HYPERTENSION GOAL BP (BLOOD PRESSURE) < 140/90: ICD-10-CM

## 2021-03-02 PROCEDURE — 96127 BRIEF EMOTIONAL/BEHAV ASSMT: CPT | Performed by: FAMILY MEDICINE

## 2021-03-02 PROCEDURE — 99214 OFFICE O/P EST MOD 30 MIN: CPT | Mod: 95 | Performed by: FAMILY MEDICINE

## 2021-03-02 RX ORDER — LOSARTAN POTASSIUM 100 MG/1
100 TABLET ORAL DAILY
Qty: 90 TABLET | Refills: 1 | Status: SHIPPED | OUTPATIENT
Start: 2021-03-02 | End: 2021-10-05

## 2021-03-02 ASSESSMENT — ANXIETY QUESTIONNAIRES
3. WORRYING TOO MUCH ABOUT DIFFERENT THINGS: NOT AT ALL
2. NOT BEING ABLE TO STOP OR CONTROL WORRYING: SEVERAL DAYS
IF YOU CHECKED OFF ANY PROBLEMS ON THIS QUESTIONNAIRE, HOW DIFFICULT HAVE THESE PROBLEMS MADE IT FOR YOU TO DO YOUR WORK, TAKE CARE OF THINGS AT HOME, OR GET ALONG WITH OTHER PEOPLE: NOT DIFFICULT AT ALL
1. FEELING NERVOUS, ANXIOUS, OR ON EDGE: NOT AT ALL
5. BEING SO RESTLESS THAT IT IS HARD TO SIT STILL: NOT AT ALL
6. BECOMING EASILY ANNOYED OR IRRITABLE: SEVERAL DAYS
GAD7 TOTAL SCORE: 2
7. FEELING AFRAID AS IF SOMETHING AWFUL MIGHT HAPPEN: NOT AT ALL

## 2021-03-02 ASSESSMENT — PATIENT HEALTH QUESTIONNAIRE - PHQ9: 5. POOR APPETITE OR OVEREATING: NOT AT ALL

## 2021-03-02 NOTE — TELEPHONE ENCOUNTER
Patient has a virtual appointment scheduled with PCP for 3/2/2021.  Maria Luisa Chambers CMA ([facility])

## 2021-03-02 NOTE — PROGRESS NOTES
Margie is a 50 year old who is being evaluated via a billable video visit.      How would you like to obtain your AVS? MyChart  If the video visit is dropped, the invitation should be resent by: Text to cell phone: [phone]  Will anyone else be joining your video visit? No       telephone visit     Assessment & Plan     Anxiety  The current medical regimen is effective;  continue present plan and medications.  - FLUoxetine (PROZAC) 20 MG capsule; Take 1 capsule (20 mg) by mouth daily    Hypertension goal BP (blood pressure) < 140/90  The current medical regimen is effective;  continue present plan and medications.  - losartan (COZAAR) 100 MG tablet; Take 1 tablet (100 mg) by mouth daily    Menopausal syndrome (hot flashes)  Managed by Prozac    Special screening for malignant neoplasms, colon    - COLOGJUWANRD(EXACT SCIENCES)    Encounter for screening mammogram for breast cancer    - MA Screen Bilateral w/Wenceslao; Future      15 minutes spent on the date of the encounter doing chart review, interpretation of tests, patient visit and documentation         Return in about 6 months (around 9/2/2021) for in person, BP Recheck, mood recheck.    Cheryl Augustin, [facility]   Margie is a 50 year old who presents for the following health issues  accompanied by her self:    HPI       Anxiety Follow-Up    How are you doing with your anxiety since your last visit? Improved     Are you having other symptoms that might be associated with anxiety? Yes:  sometimes menopausal symptoms-hot flashes but that's also getting better    Have you had a significant life event? No     Are you feeling depressed? No    Do you have any concerns with your use of alcohol or other drugs? No     prozac 20 mg every day, working from home        Social History     Tobacco Use     Smoking status: Never Smoker     Smokeless tobacco: Never Used   Substance Use Topics     Alcohol use: No     Drug use: No     ADELIA-7  SCORE 3/2/2021   Total Score 2     PHQ 2/4/2020   PHQ-9 Total Score 3   Q9: Thoughts of better off dead/self-harm past 2 weeks Not at all     ADELIA-7  3/2/2021   1. Feeling nervous, anxious, or on edge 0   2. Not being able to stop or control worrying 1   3. Worrying too much about different things 0   4. Trouble relaxing 0   5. Being so restless that it is hard to sit still 0   6. Becoming easily annoyed or irritable 1   7. Feeling afraid, as if something awful might happen 0   ADELIA-7 Total Score 2   If you checked any problems, how difficult have they made it for you to do your work, take care of things at home, or get along with other people? Not difficult at all         How many servings of fruits and vegetables do you eat daily?  4 or more    On average, how many sweetened beverages do you drink each day (Examples: soda, juice, sweet tea, etc.  Do NOT count diet or artificially sweetened beverages)?   1    How many days per week do you exercise enough to make your heart beat faster? 4    How many minutes a day do you exercise enough to make your heart beat faster? 20 - 29    How many days per week do you miss taking your medication? 0      Hypertension Follow-up      Do you check your blood pressure regularly outside of the clinic? Yes     Are you following a low salt diet? Yes    Are your blood pressures ever more than 140 on the top number (systolic) OR more   than 90 on the bottom number (diastolic), for example 140/90? No   120/77    Cozaar 100 mg every day       Review of Systems   Constitutional, HEENT, cardiovascular, pulmonary, gi and gu systems are negative, except as otherwise noted.      Objective           Vitals:  No vitals were obtained today due to virtual visit.    Physical Exam   GENERAL: Healthy, alert and no distress  EYES: Eyes grossly normal to inspection.  No discharge or erythema, or obvious scleral/conjunctival abnormalities.  RESP: No audible wheeze, cough, or visible cyanosis.  No visible  retractions or increased work of breathing.    SKIN: Visible skin clear. No significant rash, abnormal pigmentation or lesions.  NEURO: Cranial nerves grossly intact.  Mentation and speech appropriate for age.  PSYCH: Mentation appears normal, affect normal/bright, judgement and insight intact, normal speech and appearance well-groomed.      This was converted to a telephone visit as Roman and Milli were not working today.    Duration of phone visit 15 minutes

## 2021-03-03 ENCOUNTER — MYC MEDICAL ADVICE (OUTPATIENT)
Dept: FAMILY MEDICINE | Facility: CLINIC | Age: 51
End: 2021-03-03

## 2021-03-03 ASSESSMENT — ANXIETY QUESTIONNAIRES: GAD7 TOTAL SCORE: 2

## 2021-03-03 NOTE — TELEPHONE ENCOUNTER
Spoke to patient over the phone. Advised patient to contact [facility] and let them know where to transfer the prescriptions. Patient verbalized understanding.  Maria Luisa Chambers CMA ([facility])

## 2021-03-09 ENCOUNTER — HOSPITAL ENCOUNTER (OUTPATIENT)
Dept: MAMMOGRAPHY | Facility: CLINIC | Age: 51
Discharge: HOME | End: 2021-03-09
Attending: FAMILY MEDICINE | Admitting: FAMILY MEDICINE
Payer: COMMERCIAL

## 2021-03-09 DIAGNOSIS — Z12.31 ENCOUNTER FOR SCREENING MAMMOGRAM FOR BREAST CANCER: ICD-10-CM

## 2021-03-09 PROCEDURE — 77063 BREAST TOMOSYNTHESIS BI: CPT

## 2021-06-03 ENCOUNTER — VIRTUAL VISIT (OUTPATIENT)
Dept: FAMILY MEDICINE | Facility: CLINIC | Age: 51
End: 2021-06-03
Payer: COMMERCIAL

## 2021-06-03 DIAGNOSIS — F41.1 GAD (GENERALIZED ANXIETY DISORDER): Primary | ICD-10-CM

## 2021-06-03 DIAGNOSIS — H10.13 ALLERGIC CONJUNCTIVITIS OF BOTH EYES: ICD-10-CM

## 2021-06-03 DIAGNOSIS — F33.1 MODERATE EPISODE OF RECURRENT MAJOR DEPRESSIVE DISORDER (H): ICD-10-CM

## 2021-06-03 DIAGNOSIS — I10 HYPERTENSION GOAL BP (BLOOD PRESSURE) < 140/90: ICD-10-CM

## 2021-06-03 PROCEDURE — 99215 OFFICE O/P EST HI 40 MIN: CPT | Mod: 95 | Performed by: FAMILY MEDICINE

## 2021-06-03 PROCEDURE — 96127 BRIEF EMOTIONAL/BEHAV ASSMT: CPT | Mod: 95 | Performed by: FAMILY MEDICINE

## 2021-06-03 RX ORDER — BUSPIRONE HYDROCHLORIDE 5 MG/1
5-10 TABLET ORAL 2 TIMES DAILY
Qty: 60 TABLET | Refills: 1 | Status: SHIPPED | OUTPATIENT
Start: 2021-06-03 | End: 2021-07-26

## 2021-06-03 RX ORDER — FLUOXETINE 40 MG/1
40 CAPSULE ORAL DAILY
Qty: 30 CAPSULE | Refills: 1 | Status: SHIPPED | OUTPATIENT
Start: 2021-06-03 | End: 2021-07-29

## 2021-06-03 ASSESSMENT — ANXIETY QUESTIONNAIRES
7. FEELING AFRAID AS IF SOMETHING AWFUL MIGHT HAPPEN: NOT AT ALL
GAD7 TOTAL SCORE: 17
2. NOT BEING ABLE TO STOP OR CONTROL WORRYING: NEARLY EVERY DAY
3. WORRYING TOO MUCH ABOUT DIFFERENT THINGS: NEARLY EVERY DAY
6. BECOMING EASILY ANNOYED OR IRRITABLE: NEARLY EVERY DAY
IF YOU CHECKED OFF ANY PROBLEMS ON THIS QUESTIONNAIRE, HOW DIFFICULT HAVE THESE PROBLEMS MADE IT FOR YOU TO DO YOUR WORK, TAKE CARE OF THINGS AT HOME, OR GET ALONG WITH OTHER PEOPLE: EXTREMELY DIFFICULT
5. BEING SO RESTLESS THAT IT IS HARD TO SIT STILL: MORE THAN HALF THE DAYS
1. FEELING NERVOUS, ANXIOUS, OR ON EDGE: NEARLY EVERY DAY

## 2021-06-03 ASSESSMENT — PATIENT HEALTH QUESTIONNAIRE - PHQ9
5. POOR APPETITE OR OVEREATING: NEARLY EVERY DAY
SUM OF ALL RESPONSES TO PHQ QUESTIONS 1-9: 10

## 2021-06-03 NOTE — LETTER
[facility]  [address] [address]  Phone: [phone]    Azul 3, 2021        Margie Becker  [address] [address]          To whom it may concern:    RE: Margie Becker    Patient was seen and treated today at our clinic.  She will be on a continuous leave starting today Azul 3, 2021 and extending till July 3, 2021.  She may need reduced work hours at the end of this leave that is to be determined.  Please send the necessary FMLA paperwork to our clinic.    Please contact me for questions or concerns.      Sincerely,          Cheryl Augustin, DO

## 2021-06-03 NOTE — PROGRESS NOTES
Margie is a 51 year old who is being evaluated via a billable video visit.      How would you like to obtain your AVS? MyChart  If the video visit is dropped, the invitation should be resent by: Text to cell phone: [phone]  Will anyone else be joining your video visit? No      Video Start Time: 9 AM    Assessment & Plan     ADELIA (generalized anxiety disorder)    I am suggesting this patient take a leave of absence from work for approximately 4 weeks.  During that time we will increase her Prozac from 20 to 40 mg daily and start BuSpar for acute anxiety 5 to 10 mg twice daily.  I will have her follow-up in 2 weeks.  It is possible she will need to have a reduced schedule when she goes back to work.    - FLUoxetine (PROZAC) 40 MG capsule; Take 1 capsule (40 mg) by mouth daily  - busPIRone (BUSPAR) 5 MG tablet; Take 1-2 tablets (5-10 mg) by mouth 2 times daily    Moderate episode of recurrent major depressive disorder (H)  Increase Prozac  - FLUoxetine (PROZAC) 40 MG capsule; Take 1 capsule (40 mg) by mouth daily    Hypertension goal BP (blood pressure) < 140/90  The current medical regimen is effective;  continue present plan and medications.      Allergic conjunctivitis of both eyes  The patient was instructed to try over-the-counter allergy eyedrops.  She was also instructed she could try nasal steroids as this also helps with allergic conjunctivitis as well as her seasonal allergies        43 minutes spent on the date of the encounter doing chart review, patient visit and documentation        Depression Screening Follow Up    PHQ 6/3/2021   PHQ-9 Total Score 10   Q9: Thoughts of better off dead/self-harm past 2 weeks Not at all     Last PHQ-9 6/3/2021   1.  Little interest or pleasure in doing things 2   2.  Feeling down, depressed, or hopeless 2   3.  Trouble falling or staying asleep, or sleeping too much 2   4.  Feeling tired or having little energy 1   5.  Poor appetite or overeating 1   6.  Feeling bad  about yourself 0   7.  Trouble concentrating 1   8.  Moving slowly or restless 1   Q9: Thoughts of better off dead/self-harm past 2 weeks 0   PHQ-9 Total Score 10   Difficulty at work, home, or with people Very difficult       Follow Up Actions Taken  Patient counseled, no additional follow up at this time.         Return in about 2 weeks (around 6/17/2021) for mood recheck.    Cheryl Augustin DO  [facility]    Joss Hanson is a 51 year old who presents for the following health issues     HPI     -Patient also wondering if she can get Rx for eye drops to help with allergies?    Hypertension Follow-up      Do you check your blood pressure regularly outside of the clinic? Yes - but not the last 2 weeks    Are you following a low salt diet? Yes    Are your blood pressures ever more than 140 on the top number (systolic) OR more   than 90 on the bottom number (diastolic), for example 140/90? No 120-130/ 90's    Depression and Anxiety Follow-Up    How are you doing with your depression since your last visit? Worsened     How are you doing with your anxiety since your last visit?  Worsened     Are you having other symptoms that might be associated with depression or anxiety? Yes:  panic attacks, weeping    Have you had a significant life event? No     Do you have any concerns with your use of alcohol or other drugs? No    Social History     Tobacco Use     Smoking status: Never Smoker     Smokeless tobacco: Never Used   Substance Use Topics     Alcohol use: No     Drug use: No     PHQ 2/4/2020 6/3/2021   PHQ-9 Total Score 3 10   Q9: Thoughts of better off dead/self-harm past 2 weeks Not at all Not at all     ADELIA-7 SCORE 3/2/2021 6/3/2021   Total Score 2 17     Last PHQ-9 6/3/2021   1.  Little interest or pleasure in doing things 2   2.  Feeling down, depressed, or hopeless 2   3.  Trouble falling or staying asleep, or sleeping too much 2   4.  Feeling tired or having little energy 1   5.   Poor appetite or overeating 1   6.  Feeling bad about yourself 0   7.  Trouble concentrating 1   8.  Moving slowly or restless 1   Q9: Thoughts of better off dead/self-harm past 2 weeks 0   PHQ-9 Total Score 10   Difficulty at work, home, or with people Very difficult     ADELIA-7  6/3/2021   1. Feeling nervous, anxious, or on edge 3   2. Not being able to stop or control worrying 3   3. Worrying too much about different things 3   4. Trouble relaxing 3   5. Being so restless that it is hard to sit still 2   6. Becoming easily annoyed or irritable 3   7. Feeling afraid, as if something awful might happen 0   ADELIA-7 Total Score 17   If you checked any problems, how difficult have they made it for you to do your work, take care of things at home, or get along with other people? Extremely difficult       Suicide Assessment Five-step Evaluation and Treatment (SAFE-T)        How many servings of fruits and vegetables do you eat daily?  2-3    On average, how many sweetened beverages do you drink each day (Examples: soda, juice, sweet tea, etc.  Do NOT count diet or artificially sweetened beverages)?   0    How many days per week do you exercise enough to make your heart beat faster? 3 or less    How many minutes a day do you exercise enough to make your heart beat faster? 9 or less  How many days per week do you miss taking your medication? 1    What makes it hard for you to take your medications?  remembering to take        Review of Systems   Constitutional, HEENT, cardiovascular, pulmonary, gi and gu systems are negative, except as otherwise noted.      Objective           Vitals:  No vitals were obtained today due to virtual visit.    Physical Exam   GENERAL: Healthy, alert and no distress  EYES: Eyes grossly normal to inspection.  No discharge or erythema, or obvious scleral/conjunctival abnormalities.  RESP: No audible wheeze, cough, or visible cyanosis.  No visible retractions or increased work of breathing.    SKIN:  Visible skin clear. No significant rash, abnormal pigmentation or lesions.  NEURO: Cranial nerves grossly intact.  Mentation and speech appropriate for age.  PSYCH: Mentation appears normal, affect anxious and tearful t, judgement and insight intact, normal speech and appearance well-groomed.            Video-Visit Details    Type of service:  Video Visit    Video End Time:9:40 AM    Originating Location (pt. Location): Home    Distant Location (provider location):  [facility]     Platform used for Video Visit: [employer]

## 2021-06-04 ENCOUNTER — TELEPHONE (OUTPATIENT)
Dept: FAMILY MEDICINE | Facility: CLINIC | Age: 51
End: 2021-06-04

## 2021-06-04 DIAGNOSIS — F33.1 MODERATE EPISODE OF RECURRENT MAJOR DEPRESSIVE DISORDER (H): ICD-10-CM

## 2021-06-04 DIAGNOSIS — F41.1 GAD (GENERALIZED ANXIETY DISORDER): Primary | ICD-10-CM

## 2021-06-04 ASSESSMENT — ANXIETY QUESTIONNAIRES: GAD7 TOTAL SCORE: 17

## 2021-06-07 ENCOUNTER — TELEPHONE (OUTPATIENT)
Dept: FAMILY MEDICINE | Facility: CLINIC | Age: 51
End: 2021-06-07

## 2021-06-07 NOTE — TELEPHONE ENCOUNTER
Forms received from [employer]/ Disability Medical FMLA Form/Fax Date:6/4/2021 for Cheryl Augustin DO.  Forms placed in provider 'sign me' folder.  Please fax forms to [phone] after completion.    GENE EATON (R)  Radiology Department

## 2021-06-08 PROBLEM — F41.1 GAD (GENERALIZED ANXIETY DISORDER): Status: ACTIVE | Noted: 2021-06-08

## 2021-06-08 PROBLEM — F33.1 MODERATE EPISODE OF RECURRENT MAJOR DEPRESSIVE DISORDER (H): Status: ACTIVE | Noted: 2021-06-08

## 2021-06-08 NOTE — TELEPHONE ENCOUNTER
Left message on answering machine for patient to call back.  Maria Luisa Chambers CMA ([facility])

## 2021-06-08 NOTE — TELEPHONE ENCOUNTER
Please let the patient know her insurance will cover her Zofran.  We could trial other medications if the patient desires.  But as the Zofran was used rarely I do not know if we need to.  Also please let the patient know I completed her disability paperwork and faxed in today.    Cheryl Augustin DO

## 2021-06-08 NOTE — TELEPHONE ENCOUNTER
PRIOR AUTHORIZATION DENIED    Medication: ondansetron (ZOFRAN-ODT) 4 MG ODT tab - DENIED     Denial Date: 6/4/2021    Denial Rational:       Appeal Information:

## 2021-06-09 NOTE — TELEPHONE ENCOUNTER
Pt called back. Reviewed message below. Pt does not feel that she needs the Zofran. We can disregard the rx.    Valeri Jacobson RN

## 2021-06-09 NOTE — TELEPHONE ENCOUNTER
Left message on answering machine for patient to call back.  Maria Luisa Chambers CMA ([facility])

## 2021-06-20 DIAGNOSIS — F41.1 GAD (GENERALIZED ANXIETY DISORDER): ICD-10-CM

## 2021-06-21 RX ORDER — BUSPIRONE HYDROCHLORIDE 5 MG/1
5-10 TABLET ORAL 2 TIMES DAILY
Qty: 360 TABLET | Refills: 1 | OUTPATIENT
Start: 2021-06-21

## 2021-06-21 NOTE — TELEPHONE ENCOUNTER
90 day supply refused. Per last OV on 6/3/21 - patient was to have a mood follow up visit in 2 weeks. Patient did not schedule.     Needs visit for further refills. Team, please assist with scheduling.    Venita Parks, RN, BSN, PHN  [facility]: [place]

## 2021-06-23 NOTE — TELEPHONE ENCOUNTER
Called and spoke with patient- she picked up #60 of Buspar from 6/3/21 but has not started taking it yet- she wanted to see how things went with the increased Prozac first and if/when she starts the Buspar she will schedule virtual visit to follow-up.     She does not need new rx for 90 day at this time- it was just suggested by pharmacy. Patient is aware that she still has one more refill at the pharmacy if needed and when Dr. Augustin is back in clinic- patient will schedule virtual visit through [employer]- she just has to look at her schedule first.    Anisa Manjarrez MA

## 2021-07-24 DIAGNOSIS — F41.1 GAD (GENERALIZED ANXIETY DISORDER): ICD-10-CM

## 2021-07-26 RX ORDER — BUSPIRONE HYDROCHLORIDE 5 MG/1
5-10 TABLET ORAL 2 TIMES DAILY
Qty: 60 TABLET | Refills: 1 | Status: SHIPPED | OUTPATIENT
Start: 2021-07-26 | End: 2021-10-05

## 2021-07-29 ENCOUNTER — VIRTUAL VISIT (OUTPATIENT)
Dept: FAMILY MEDICINE | Facility: CLINIC | Age: 51
End: 2021-07-29
Payer: COMMERCIAL

## 2021-07-29 DIAGNOSIS — F41.1 GAD (GENERALIZED ANXIETY DISORDER): ICD-10-CM

## 2021-07-29 DIAGNOSIS — F33.1 MODERATE EPISODE OF RECURRENT MAJOR DEPRESSIVE DISORDER (H): ICD-10-CM

## 2021-07-29 PROCEDURE — 99214 OFFICE O/P EST MOD 30 MIN: CPT | Mod: 95 | Performed by: FAMILY MEDICINE

## 2021-07-29 RX ORDER — FLUOXETINE 40 MG/1
40 CAPSULE ORAL DAILY
Qty: 90 CAPSULE | Refills: 1 | Status: SHIPPED | OUTPATIENT
Start: 2021-07-29 | End: 2022-02-01

## 2021-07-29 ASSESSMENT — PATIENT HEALTH QUESTIONNAIRE - PHQ9
5. POOR APPETITE OR OVEREATING: NOT AT ALL
SUM OF ALL RESPONSES TO PHQ QUESTIONS 1-9: 2

## 2021-07-29 ASSESSMENT — ANXIETY QUESTIONNAIRES
6. BECOMING EASILY ANNOYED OR IRRITABLE: SEVERAL DAYS
GAD7 TOTAL SCORE: 3
2. NOT BEING ABLE TO STOP OR CONTROL WORRYING: NOT AT ALL
3. WORRYING TOO MUCH ABOUT DIFFERENT THINGS: SEVERAL DAYS
IF YOU CHECKED OFF ANY PROBLEMS ON THIS QUESTIONNAIRE, HOW DIFFICULT HAVE THESE PROBLEMS MADE IT FOR YOU TO DO YOUR WORK, TAKE CARE OF THINGS AT HOME, OR GET ALONG WITH OTHER PEOPLE: SOMEWHAT DIFFICULT
5. BEING SO RESTLESS THAT IT IS HARD TO SIT STILL: NOT AT ALL
1. FEELING NERVOUS, ANXIOUS, OR ON EDGE: SEVERAL DAYS
7. FEELING AFRAID AS IF SOMETHING AWFUL MIGHT HAPPEN: NOT AT ALL

## 2021-07-29 NOTE — PROGRESS NOTES
Margie is a 51 year old who is being evaluated via a billable video visit.      How would you like to obtain your AVS? MyChart  If the video visit is dropped, the invitation should be resent by: Text to cell phone: [phone]  Will anyone else be joining your video visit? No    Video Start Time: 10:55 AM    Assessment & Plan     ADELIA (generalized anxiety disorder)  Overall the patient is doing very well.  She is going to trial BuSpar instead of Vistaril to see if better for acute anxiety.  She needs to follow-up in 2 months for hypertension so we'll recheck anxiety been    - FLUoxetine (PROZAC) 40 MG capsule; Take 1 capsule (40 mg) by mouth daily    Moderate episode of recurrent major depressive disorder (H)  Doing very well  - FLUoxetine (PROZAC) 40 MG capsule; Take 1 capsule (40 mg) by mouth daily      22 minutes spent on the date of the encounter doing chart review, patient visit and documentation          No follow-ups on file.    Cheryl Augustin DO  [facility]   Margie is a 51 year old who presents for the following health issues     HPI     Depression and Anxiety Follow-Up    How are you doing with your depression since your last visit? Improved     How are you doing with your anxiety since your last visit?  Improved     Are you having other symptoms that might be associated with depression or anxiety? No    Have you had a significant life event? OTHER: Changing Jobs to reduce stress     Do you have any concerns with your use of alcohol or other drugs? No     She is taking Prozac 40 mg daily and Vistaril as needed for anxiety.    She has not tried BuSpar yet but is planning to.    She has needed to take some time off of work.  She is back at work now.  She is feeling bored at her current job and is planning to find a new one        Social History     Tobacco Use     Smoking status: Never Smoker     Smokeless tobacco: Never Used   Substance Use Topics     Alcohol use: No      Drug use: No     PHQ 2/4/2020 6/3/2021 7/29/2021   PHQ-9 Total Score 3 10 2   Q9: Thoughts of better off dead/self-harm past 2 weeks Not at all Not at all Not at all     ADELIA-7 SCORE 3/2/2021 6/3/2021 7/29/2021   Total Score 2 17 3     Last PHQ-9 7/29/2021   1.  Little interest or pleasure in doing things 0   2.  Feeling down, depressed, or hopeless 1   3.  Trouble falling or staying asleep, or sleeping too much 0   4.  Feeling tired or having little energy 0   5.  Poor appetite or overeating 1   6.  Feeling bad about yourself 0   7.  Trouble concentrating 0   8.  Moving slowly or restless 0   Q9: Thoughts of better off dead/self-harm past 2 weeks 0   PHQ-9 Total Score 2   Difficulty at work, home, or with people Somewhat difficult     ADELIA-7  7/29/2021   1. Feeling nervous, anxious, or on edge 1   2. Not being able to stop or control worrying 0   3. Worrying too much about different things 1   4. Trouble relaxing 0   5. Being so restless that it is hard to sit still 0   6. Becoming easily annoyed or irritable 1   7. Feeling afraid, as if something awful might happen 0   ADELIA-7 Total Score 3   If you checked any problems, how difficult have they made it for you to do your work, take care of things at home, or get along with other people? Somewhat difficult       Suicide Assessment Five-step Evaluation and Treatment (SAFE-T)      How many servings of fruits and vegetables do you eat daily?  2-3    On average, how many sweetened beverages do you drink each day (Examples: soda, juice, sweet tea, etc.  Do NOT count diet or artificially sweetened beverages)?   0    How many days per week do you exercise enough to make your heart beat faster? 3 or less    How many minutes a day do you exercise enough to make your heart beat faster? 9 or less    How many days per week do you miss taking your medication? 0          Review of Systems   Constitutional, HEENT, cardiovascular, pulmonary, gi and gu systems are negative, except  as otherwise noted.      Objective           Vitals:  No vitals were obtained today due to virtual visit.    Physical Exam   GENERAL: Healthy, alert and no distress  EYES: Eyes grossly normal to inspection.  No discharge or erythema, or obvious scleral/conjunctival abnormalities.  RESP: No audible wheeze, cough, or visible cyanosis.  No visible retractions or increased work of breathing.    SKIN: Visible skin clear. No significant rash, abnormal pigmentation or lesions.  NEURO: Cranial nerves grossly intact.  Mentation and speech appropriate for age.  PSYCH: Mentation appears normal, affect normal/bright, judgement and insight intact, normal speech and appearance well-groomed.            Video-Visit Details    Type of service:  Video Visit    Video End Time:11:10 AM    Originating Location (pt. Location): Home    Distant Location (provider location):  [facility]     Platform used for Video Visit: [employer]

## 2021-07-30 ASSESSMENT — ANXIETY QUESTIONNAIRES: GAD7 TOTAL SCORE: 3

## 2021-08-13 ENCOUNTER — MYC MEDICAL ADVICE (OUTPATIENT)
Dept: FAMILY MEDICINE | Facility: CLINIC | Age: 51
End: 2021-08-13

## 2021-08-13 DIAGNOSIS — R11.0 NAUSEA: ICD-10-CM

## 2021-08-13 RX ORDER — ONDANSETRON 4 MG/1
TABLET, ORALLY DISINTEGRATING ORAL
Qty: 20 TABLET | Refills: 1 | Status: SHIPPED | OUTPATIENT
Start: 2021-08-13 | End: 2023-05-08

## 2021-08-13 NOTE — TELEPHONE ENCOUNTER
Routing to [place] provider pool in [facility].    Pt would like refill Ondansetron for nausea while traveling.    Rx pending approval if appropriate    Valeri Jacobson RN

## 2021-09-11 ENCOUNTER — HEALTH MAINTENANCE LETTER (OUTPATIENT)
Age: 51
End: 2021-09-11

## 2021-10-10 ENCOUNTER — OFFICE VISIT (OUTPATIENT)
Dept: URGENT CARE | Facility: URGENT CARE | Age: 51
End: 2021-10-10
Payer: COMMERCIAL

## 2021-10-10 ENCOUNTER — ANCILLARY PROCEDURE (OUTPATIENT)
Dept: GENERAL RADIOLOGY | Facility: CLINIC | Age: 51
End: 2021-10-10
Attending: PHYSICIAN ASSISTANT
Payer: COMMERCIAL

## 2021-10-10 VITALS
TEMPERATURE: 98.7 F | SYSTOLIC BLOOD PRESSURE: 143 MMHG | DIASTOLIC BLOOD PRESSURE: 86 MMHG | OXYGEN SATURATION: 97 % | HEART RATE: 81 BPM | BODY MASS INDEX: 29.29 KG/M2 | WEIGHT: 177.38 LBS | RESPIRATION RATE: 16 BRPM

## 2021-10-10 DIAGNOSIS — S99.921A FOOT INJURY, RIGHT, INITIAL ENCOUNTER: Primary | ICD-10-CM

## 2021-10-10 PROCEDURE — 73630 X-RAY EXAM OF FOOT: CPT | Mod: RT | Performed by: RADIOLOGY

## 2021-10-10 PROCEDURE — 99213 OFFICE O/P EST LOW 20 MIN: CPT | Performed by: PHYSICIAN ASSISTANT

## 2021-10-10 ASSESSMENT — ENCOUNTER SYMPTOMS
FEVER: 0
CONSTITUTIONAL NEGATIVE: 1
JOINT SWELLING: 1
NECK PAIN: 0
SHORTNESS OF BREATH: 0
NECK STIFFNESS: 0
CHILLS: 0
BACK PAIN: 0
PALPITATIONS: 0
WOUND: 0
COUGH: 0
FATIGUE: 0
CARDIOVASCULAR NEGATIVE: 1
ARTHRALGIAS: 1
MYALGIAS: 1
CHEST TIGHTNESS: 0
COLOR CHANGE: 1
WHEEZING: 0

## 2021-10-10 NOTE — PROGRESS NOTES
Joss Hanson is a 51 year old who presents for the following health issues   HPI   Musculoskeletal problem/pain  Onset/Duration: last night  Description  Location: R 5th toe  Joint Swelling: YES  Redness: no  Pain: YES  Warmth: no  Intensity:  moderate  Progression of Symptoms:  same  Accompanying signs and symptoms:   Fevers: no  Numbness/tingling/weakness: no  History  Trauma to the area: YES- sustained an injury to the R 5th toe after falling and landing on her foot.  No head/neck injuries or LOC.  Recent illness:  no  Previous similar problem: no  Previous evaluation:  no  Precipitating or alleviating factors:  Aggravating factors include: standing, walking, climbing stairs and overuse  Therapies tried and outcome: rest/inactivity, ice, acetaminophen with minimal relief    Patient Active Problem List   Diagnosis     FAMILY HX PSYCHIATRIC COND anxiety, obsession     Atopic rhinitis     Fear of travel with panic attacks     Excessive or frequent menstruation     Uterine leiomyoma     Vitamin D deficiency     Hypertension goal BP (blood pressure) < 140/90     ADELIA (generalized anxiety disorder)     Moderate episode of recurrent major depressive disorder (H)     Current Outpatient Medications   Medication     busPIRone (BUSPAR) 5 MG tablet     CENTRUM OR TABS     cetirizine (ZYRTEC) 10 MG tablet     FLUoxetine (PROZAC) 40 MG capsule     losartan (COZAAR) 100 MG tablet     ondansetron (ZOFRAN-ODT) 4 MG ODT tab     Cholecalciferol (VITAMIN D) 1000 UNIT capsule     Ferrous Sulfate (IRON SUPPLEMENT PO)     folic acid 800 MCG TABS     hydrOXYzine (VISTARIL) 25 MG capsule     scopolamine (TRANSDERM) 1 MG/3DAYS 72 hr patch     No current facility-administered medications for this visit.        Allergies   Allergen Reactions     Iodine Hives     xray dye     Penicillins Hives     Hydrochlorothiazide Rash     Lexapro [Escitalopram Oxalate] Rash     Sulfa Drugs Rash       Review of Systems   Constitutional: Negative.   Negative for chills, fatigue and fever.   Respiratory: Negative for cough, chest tightness, shortness of breath and wheezing.    Cardiovascular: Negative.  Negative for chest pain, palpitations and peripheral edema.   Musculoskeletal: Positive for arthralgias, gait problem, joint swelling and myalgias. Negative for back pain, neck pain and neck stiffness.   Skin: Positive for color change. Negative for pallor, rash and wound.   All other systems reviewed and are negative.           Objective    BP (!) 143/86 (BP Location: Left arm, Patient Position: Sitting, Cuff Size: Adult Regular)   Pulse 81   Temp 98.7  F (37.1  C) (Oral)   Resp 16   Wt 80.5 kg (177 lb 6 oz)   SpO2 97%   Breastfeeding No   BMI 29.29 kg/m    Body mass index is 29.29 kg/m .  Physical Exam  Vitals and nursing note reviewed.   Constitutional:       General: She is not in acute distress.     Appearance: Normal appearance. She is well-developed and normal weight. She is not ill-appearing.   Musculoskeletal:      Right ankle: Normal. No swelling. No tenderness. Normal range of motion.      Right Achilles Tendon: Normal.      Left ankle: No swelling, deformity, ecchymosis or lacerations. No tenderness. No CF ligament, posterior TF ligament or proximal fibula tenderness. Normal range of motion. Normal pulse.      Left Achilles Tendon: Normal.      Right foot: Decreased range of motion. Normal capillary refill. Swelling, tenderness and bony tenderness (5th digit phalanx with ecchymosis) present. No deformity, bunion, Charcot foot, foot drop, prominent metatarsal heads, laceration or crepitus. Normal pulse.      Left foot: Normal range of motion and normal capillary refill. No swelling, deformity, laceration, tenderness, bony tenderness or crepitus. Normal pulse.   Skin:     General: Skin is warm and dry.      Capillary Refill: Capillary refill takes less than 2 seconds.      Comments: Distal pulses are 2+ and symmetric.  No peripheral edema.    Neurological:      Mental Status: She is alert and oriented to person, place, and time.      Sensory: Sensation is intact. No sensory deficit.      Motor: Motor function is intact.      Gait: Gait abnormal.      Deep Tendon Reflexes: Reflexes are normal and symmetric.   Psychiatric:         Mood and Affect: Mood normal.         Behavior: Behavior normal.         Thought Content: Thought content normal.         Judgment: Judgment normal.        Results for orders placed or performed in visit on 10/10/21 (from the past 24 hour(s))   XR Foot Right G/E 3 Views    Narrative    EXAM: XR FOOT RIGHT G/E 3 VIEWS  LOCATION: [facility]  DATE/TIME: 10/10/2021 12:00 PM    INDICATION:  Foot injury, right, initial encounter  COMPARISON: None.      Impression    IMPRESSION: No fracture or dislocation. Mild degenerative changes at the first MTP joint.         Assessment/Plan:  Foot injury, right, initial encounter: Xrays are negative for acute fractures or dislocations. Most likely strain/sprain/contusion.  Recommend RICE, sola taping to adjacent toe, wear comfortable shoes and tylenol/ibuprofen prn pain.  Will also send to orthopedics for further evaluation and management if no improvement.  Recheck in clinic if symptoms worsen or if symptoms do not improve.   -     XR Foot Right G/E 3 Views  -     Orthopedic  Referral        Estela See BABAR Marti

## 2021-10-18 ENCOUNTER — MYC MEDICAL ADVICE (OUTPATIENT)
Dept: FAMILY MEDICINE | Facility: CLINIC | Age: 51
End: 2021-10-18

## 2021-10-18 NOTE — TELEPHONE ENCOUNTER
Patient Quality Outreach      Summary:    Patient has the following on her problem list/HM:     Depression / Dysthymia review    6 Month Remission: 4-8 month window range:   12 Month Remission: 10-14 month window range:        PHQ-9 SCORE 2/4/2020 6/3/2021 7/29/2021   PHQ-9 Total Score - - -   PHQ-9 Total Score 3 10 2       If PHQ-9 recheck is 5 or more, route to provider for next steps.    Hypertension   Last three blood pressure readings:  BP Readings from Last 3 Encounters:   10/10/21 (!) 143/86   02/04/20 126/76   08/30/19 134/70     Blood pressure: Failed    HTN Guidelines:  ? 139/89     Patient is due/failing the following:   Hypertension -  Hypertension follow-up visit  Depression  -  Depression follow-up visit  Physical  - Overdue    Type of outreach:    Patient has appt scheduled for 12/13/21    Questions for provider review:    None                                                                                       Anisa Manjarrez MA       Chart routed to Care Team.

## 2021-12-13 ENCOUNTER — OFFICE VISIT (OUTPATIENT)
Dept: FAMILY MEDICINE | Facility: CLINIC | Age: 51
End: 2021-12-13
Payer: COMMERCIAL

## 2021-12-13 VITALS
BODY MASS INDEX: 30.39 KG/M2 | HEART RATE: 96 BPM | SYSTOLIC BLOOD PRESSURE: 150 MMHG | OXYGEN SATURATION: 98 % | WEIGHT: 184 LBS | DIASTOLIC BLOOD PRESSURE: 86 MMHG | TEMPERATURE: 98.6 F

## 2021-12-13 DIAGNOSIS — Z82.61 FAMILY HISTORY OF RHEUMATOID ARTHRITIS: ICD-10-CM

## 2021-12-13 DIAGNOSIS — I10 HYPERTENSION GOAL BP (BLOOD PRESSURE) < 140/90: Primary | ICD-10-CM

## 2021-12-13 DIAGNOSIS — M25.50 MULTIPLE JOINT PAIN: ICD-10-CM

## 2021-12-13 DIAGNOSIS — F41.1 GAD (GENERALIZED ANXIETY DISORDER): ICD-10-CM

## 2021-12-13 DIAGNOSIS — Z23 HIGH PRIORITY FOR 2019-NCOV VACCINE: ICD-10-CM

## 2021-12-13 LAB
CRP SERPL-MCNC: 14 MG/L (ref 0–8)
ERYTHROCYTE [SEDIMENTATION RATE] IN BLOOD BY WESTERGREN METHOD: 13 MM/HR (ref 0–30)

## 2021-12-13 PROCEDURE — 36415 COLL VENOUS BLD VENIPUNCTURE: CPT | Performed by: FAMILY MEDICINE

## 2021-12-13 PROCEDURE — 0064A COVID-19,PF,MODERNA (18+ YRS BOOSTER .25ML): CPT | Performed by: FAMILY MEDICINE

## 2021-12-13 PROCEDURE — 86431 RHEUMATOID FACTOR QUANT: CPT | Performed by: FAMILY MEDICINE

## 2021-12-13 PROCEDURE — 85652 RBC SED RATE AUTOMATED: CPT | Performed by: FAMILY MEDICINE

## 2021-12-13 PROCEDURE — 80048 BASIC METABOLIC PNL TOTAL CA: CPT | Performed by: FAMILY MEDICINE

## 2021-12-13 PROCEDURE — 99214 OFFICE O/P EST MOD 30 MIN: CPT | Performed by: FAMILY MEDICINE

## 2021-12-13 PROCEDURE — 91306 COVID-19,PF,MODERNA (18+ YRS BOOSTER .25ML): CPT | Performed by: FAMILY MEDICINE

## 2021-12-13 PROCEDURE — 96127 BRIEF EMOTIONAL/BEHAV ASSMT: CPT | Performed by: FAMILY MEDICINE

## 2021-12-13 PROCEDURE — 86140 C-REACTIVE PROTEIN: CPT | Performed by: FAMILY MEDICINE

## 2021-12-13 RX ORDER — BUSPIRONE HYDROCHLORIDE 5 MG/1
5-10 TABLET ORAL 2 TIMES DAILY
Qty: 180 TABLET | Refills: 1 | Status: SHIPPED | OUTPATIENT
Start: 2021-12-13 | End: 2022-04-11

## 2021-12-13 RX ORDER — LOSARTAN POTASSIUM 100 MG/1
100 TABLET ORAL DAILY
Qty: 90 TABLET | Refills: 1 | Status: SHIPPED | OUTPATIENT
Start: 2021-12-13 | End: 2022-05-23

## 2021-12-13 ASSESSMENT — ANXIETY QUESTIONNAIRES
7. FEELING AFRAID AS IF SOMETHING AWFUL MIGHT HAPPEN: NOT AT ALL
4. TROUBLE RELAXING: NOT AT ALL
6. BECOMING EASILY ANNOYED OR IRRITABLE: NOT AT ALL
2. NOT BEING ABLE TO STOP OR CONTROL WORRYING: NOT AT ALL
GAD7 TOTAL SCORE: 2
1. FEELING NERVOUS, ANXIOUS, OR ON EDGE: SEVERAL DAYS
3. WORRYING TOO MUCH ABOUT DIFFERENT THINGS: SEVERAL DAYS
GAD7 TOTAL SCORE: 2
5. BEING SO RESTLESS THAT IT IS HARD TO SIT STILL: NOT AT ALL
7. FEELING AFRAID AS IF SOMETHING AWFUL MIGHT HAPPEN: NOT AT ALL
GAD7 TOTAL SCORE: 2

## 2021-12-13 NOTE — PROGRESS NOTES
Assessment & Plan     Hypertension goal BP (blood pressure) < 140/90  The current medical regimen is effective;  continue present plan and medications.    - Basic metabolic panel  (Ca, Cl, CO2, Creat, Gluc, K, Na, BUN); Future  - losartan (COZAAR) 100 MG tablet; Take 1 tablet (100 mg) by mouth daily  - Basic metabolic panel  (Ca, Cl, CO2, Creat, Gluc, K, Na, BUN)    ADELIA (generalized anxiety disorder)  The current medical regimen is effective;  continue present plan and medications.  She was told to take BuSpar 10 mg in the morning and only 5 before bed due to side effects  - busPIRone (BUSPAR) 5 MG tablet; Take 1-2 tablets (5-10 mg) by mouth 2 times daily    Multiple joint pain  Lumbar see Ortho to determine if these nodules are related to osteoarthritis.  She has a family history of rheumatoid arthritis so we will get labs below  - Orthopedic  Referral; Future  - Rheumatoid factor; Future  - CRP, inflammation; Future  - ESR: Erythrocyte sedimentation rate; Future  - Rheumatoid factor  - CRP, inflammation  - ESR: Erythrocyte sedimentation rate    Family history of rheumatoid arthritis    - Orthopedic  Referral; Future    High priority for 2019-nCoV vaccine    - COVID-19,PF,MODERNA (18+ Yrs BOOSTER .25mL)      30 minutes spent on the date of the encounter doing chart review, history and exam, documentation and further activities per the note         Return in about 6 months (around 6/13/2022) for BP Recheck.    Cheryl Augustin DO  [facility]    Joss Hanson is a 51 year old who presents for the following health issues  accompanied by her None.    History of Present Illness       Mental Health Follow-up:  Patient presents to follow-up on Anxiety.    Patient's anxiety since last visit has been:  Good  The patient is not having other symptoms associated with anxiety.  Any significant life events: No  Patient is not feeling anxious or having panic attacks.  Patient has  no concerns about alcohol or drug use.     Social History  Tobacco Use    Smoking status: Never Smoker    Smokeless tobacco: Never Used  Alcohol use: No  Drug use: No      Today's PHQ-9         PHQ-9 Total Score:         PHQ-9 Q9 Thoughts of better off dead/self-harm past 2 weeks :       Thoughts of suicide or self harm:      Self-harm Plan:        Self-harm Action:          Safety concerns for self or others:           Hypertension: She presents for follow up of hypertension.  She does check blood pressure  regularly outside of the clinic. Outside blood pressures have been over 140/90. She follows a low salt diet.     She eats 2-3 servings of fruits and vegetables daily.She consumes 2 sweetened beverage(s) daily.She exercises with enough effort to increase her heart rate 20 to 29 minutes per day.    She is taking medications regularly.  Answers for HPI/ROS submitted by the patient on 12/13/2021  ADELIA 7 TOTAL SCORE: 2    Has Cuff machine at home.  Cozaar 100 mg daily    Prozac 40 mg daily for anxiety.  She is no longer needing the Vistaril.  She is taking buspar 5 mg bid and doing well.      She fell and hit her toe.  The xray was negative so she was told to sola tape it and it is better.      Review of Systems   Constitutional, HEENT, cardiovascular, pulmonary, gi and gu systems are negative, except as otherwise noted.      Objective    BP (!) 150/86 (BP Location: Right arm, Patient Position: Sitting, Cuff Size: Adult Regular)   Pulse 96   Temp 98.6  F (37  C) (Oral)   Wt 83.5 kg (184 lb)   SpO2 98%   BMI 30.39 kg/m    Body mass index is 30.39 kg/m .  Physical Exam   GENERAL: healthy, alert and no distress  NECK: no adenopathy, no asymmetry, masses, or scars and thyroid normal to palpation  RESP: lungs clear to auscultation - no rales, rhonchi or wheezes  CV: regular rate and rhythm, normal S1 S2, no S3 or S4, no murmur, click or rub, no peripheral edema and peripheral pulses strong  MS: Nodule on the PIP  joint of the second right toe  SKIN: 2 mm nodule over the left fifth digit DIP joint  PSYCH: mentation appears normal, affect normal/bright

## 2021-12-14 LAB
ANION GAP SERPL CALCULATED.3IONS-SCNC: 6 MMOL/L (ref 3–14)
BUN SERPL-MCNC: 12 MG/DL (ref 7–30)
CALCIUM SERPL-MCNC: 8.5 MG/DL (ref 8.5–10.1)
CHLORIDE BLD-SCNC: 101 MMOL/L (ref 94–109)
CO2 SERPL-SCNC: 27 MMOL/L (ref 20–32)
CREAT SERPL-MCNC: 1 MG/DL (ref 0.52–1.04)
GFR SERPL CREATININE-BSD FRML MDRD: 65 ML/MIN/1.73M2
GLUCOSE BLD-MCNC: 87 MG/DL (ref 70–99)
POTASSIUM BLD-SCNC: 4 MMOL/L (ref 3.4–5.3)
RHEUMATOID FACT SER NEPH-ACNC: 9 IU/ML
SODIUM SERPL-SCNC: 134 MMOL/L (ref 133–144)

## 2021-12-14 ASSESSMENT — ANXIETY QUESTIONNAIRES: GAD7 TOTAL SCORE: 2

## 2022-01-01 ENCOUNTER — HEALTH MAINTENANCE LETTER (OUTPATIENT)
Age: 52
End: 2022-01-01

## 2022-01-31 DIAGNOSIS — F33.1 MODERATE EPISODE OF RECURRENT MAJOR DEPRESSIVE DISORDER (H): ICD-10-CM

## 2022-01-31 DIAGNOSIS — F41.1 GAD (GENERALIZED ANXIETY DISORDER): ICD-10-CM

## 2022-02-01 RX ORDER — FLUOXETINE 40 MG/1
CAPSULE ORAL
Qty: 90 CAPSULE | Refills: 1 | Status: SHIPPED | OUTPATIENT
Start: 2022-02-01 | End: 2022-08-15

## 2022-04-09 DIAGNOSIS — F41.1 GAD (GENERALIZED ANXIETY DISORDER): ICD-10-CM

## 2022-04-11 RX ORDER — BUSPIRONE HYDROCHLORIDE 5 MG/1
5-10 TABLET ORAL 2 TIMES DAILY
Qty: 90 TABLET | Refills: 1 | Status: SHIPPED | OUTPATIENT
Start: 2022-04-11 | End: 2022-06-07

## 2022-05-04 ENCOUNTER — TELEPHONE (OUTPATIENT)
Dept: FAMILY MEDICINE | Facility: CLINIC | Age: 52
End: 2022-05-04
Payer: COMMERCIAL

## 2022-05-04 NOTE — TELEPHONE ENCOUNTER
Reason for call:  Patient reporting a symptom    Symptom or request: Covid symptoms? Headache, tiredness, fever.     Duration (how long have symptoms been present): 1 week since covid Positive testing at home last thursday    Have you been treated for this before? No    Additional comments:  is calling. Has some covid questions. States all 4 members of family tested positive. Please call.     Phone Number patient can be reached at:  Other phone number:  [phone]    Best Time:  any    Can we leave a detailed message on this number:  NO    Call taken on 5/4/2022 at 3:48 PM by Vanessa Stevenson

## 2022-05-04 NOTE — TELEPHONE ENCOUNTER
Called and discussed COVID concerning questions. Reviewed quarantining and OTC treatments.    Valeri Jacobson RN

## 2022-05-22 ENCOUNTER — MYC MEDICAL ADVICE (OUTPATIENT)
Dept: FAMILY MEDICINE | Facility: CLINIC | Age: 52
End: 2022-05-22
Payer: COMMERCIAL

## 2022-05-23 DIAGNOSIS — F40.01 FEAR OF TRAVEL WITH PANIC ATTACKS: ICD-10-CM

## 2022-05-23 DIAGNOSIS — I10 HYPERTENSION GOAL BP (BLOOD PRESSURE) < 140/90: ICD-10-CM

## 2022-05-23 RX ORDER — LOSARTAN POTASSIUM 100 MG/1
100 TABLET ORAL DAILY
Qty: 30 TABLET | Refills: 0 | Status: SHIPPED | OUTPATIENT
Start: 2022-05-23 | End: 2022-05-26

## 2022-05-23 RX ORDER — HYDROXYZINE PAMOATE 25 MG/1
25 CAPSULE ORAL 3 TIMES DAILY PRN
Qty: 20 CAPSULE | Refills: 0 | Status: SHIPPED | OUTPATIENT
Start: 2022-05-23 | End: 2022-09-13

## 2022-05-23 NOTE — TELEPHONE ENCOUNTER
Routing refill request to provider for review/approval because:  Labs out of range:    BP Readings from Last 3 Encounters:   12/13/21 (!) 150/86   10/10/21 (!) 143/86   02/04/20 126/76     Venita Parks, RN, BSN, PHN  [facility]: [place]

## 2022-05-26 ENCOUNTER — TELEPHONE (OUTPATIENT)
Dept: FAMILY MEDICINE | Facility: CLINIC | Age: 52
End: 2022-05-26
Payer: COMMERCIAL

## 2022-05-26 DIAGNOSIS — I10 HYPERTENSION GOAL BP (BLOOD PRESSURE) < 140/90: ICD-10-CM

## 2022-05-26 RX ORDER — LOSARTAN POTASSIUM 100 MG/1
100 TABLET ORAL DAILY
Qty: 90 TABLET | Refills: 0 | Status: SHIPPED | OUTPATIENT
Start: 2022-05-26 | End: 2022-08-23

## 2022-05-26 NOTE — TELEPHONE ENCOUNTER
RN spoke with patients Pharmacy.    Insurance will only cover 90 day supply of Losartan.    RX reordered for 90 days.    RN replied to patient via [employer]. See message for details.     Joseph Robert RN, BSN, PHN  [facility]: [place]

## 2022-06-18 ENCOUNTER — HEALTH MAINTENANCE LETTER (OUTPATIENT)
Age: 52
End: 2022-06-18

## 2022-06-25 NOTE — TELEPHONE ENCOUNTER
Routing refill request to provider for review/approval because:  PHQ9 score less then 5 in past 6 months    PHQ-9 score:    PHQ 7/29/2021   PHQ-9 Total Score 2   Q9: Thoughts of better off dead/self-harm past 2 weeks Not at all     Last seen 12/13/21    Valeri Jacobson RN                  negative...

## 2022-08-13 DIAGNOSIS — F33.1 MODERATE EPISODE OF RECURRENT MAJOR DEPRESSIVE DISORDER (H): ICD-10-CM

## 2022-08-13 DIAGNOSIS — F41.1 GAD (GENERALIZED ANXIETY DISORDER): ICD-10-CM

## 2022-08-15 DIAGNOSIS — F33.1 MODERATE EPISODE OF RECURRENT MAJOR DEPRESSIVE DISORDER (H): ICD-10-CM

## 2022-08-15 DIAGNOSIS — F41.1 GAD (GENERALIZED ANXIETY DISORDER): ICD-10-CM

## 2022-08-15 RX ORDER — FLUOXETINE 40 MG/1
40 CAPSULE ORAL DAILY
Qty: 30 CAPSULE | Refills: 0 | Status: SHIPPED | OUTPATIENT
Start: 2022-08-15 | End: 2022-08-18

## 2022-08-15 NOTE — TELEPHONE ENCOUNTER
Routing refill request to provider for review/approval because:  It has been 6 months since last visit.   phq-9 score not current       Jaz Garrett RN, BSN

## 2022-08-17 NOTE — TELEPHONE ENCOUNTER
Routing refill request to provider for review/approval because:  Failed protocol.      Lyndsey Walker RN

## 2022-08-18 RX ORDER — FLUOXETINE 40 MG/1
40 CAPSULE ORAL DAILY
Qty: 30 CAPSULE | Refills: 0 | Status: SHIPPED | OUTPATIENT
Start: 2022-08-18 | End: 2022-09-13

## 2022-08-21 DIAGNOSIS — I10 HYPERTENSION GOAL BP (BLOOD PRESSURE) < 140/90: ICD-10-CM

## 2022-08-23 RX ORDER — LOSARTAN POTASSIUM 100 MG/1
100 TABLET ORAL DAILY
Qty: 30 TABLET | Refills: 0 | Status: SHIPPED | OUTPATIENT
Start: 2022-08-23 | End: 2022-09-13

## 2022-08-23 NOTE — TELEPHONE ENCOUNTER
Routing refill request to provider for review/approval because:  Drug not on the [facility] refill protocol   BP Readings from Last 3 Encounters:   12/13/21 (!) 150/86   10/10/21 (!) 143/86   02/04/20 126/76

## 2022-08-23 NOTE — PROGRESS NOTES
[facility] [place]  [address]  [place] [address]  Phone: [phone]  Primary Provider: Cheryl Augustin  Pre-op Performing Provider: BLADIMIR HASTINGS      PREOPERATIVE EVALUATION:  Today's date: 8/29/2022    Margie Becker is a 52 year old female who presents for a preoperative evaluation.    Surgical Information:  Surgery/Procedure: Right Eye Cataract  Surgery Location: [facility] [place]  Surgeon: Patient does not know  Surgery Date: 8/31/22  Time of Surgery: AM  Where patient plans to recover: At home with family  Fax number for surgical facility: Patient does not know, will hand carry pre op clearance to surgery    Type of Anesthesia Anticipated: to be determined    Margie was seen today for pre-op exam.    Diagnoses and all orders for this visit:    Preop general physical exam    Other cataract of right eye    Other orders  -     REVIEW OF HEALTH MAINTENANCE PROTOCOL ORDERS        Margie is cleared for surgery and appropriate anesthesia  Subjective     HPI related to upcoming procedure: Right Eye Cataract      Preop Questions 8/29/2022   1. Have you ever had a heart attack or stroke? No   2. Have you ever had surgery on your heart or blood vessels, such as a stent placement, a coronary artery bypass, or surgery on an artery in your head, neck, heart, or legs? No   3. Do you have chest pain with activity? No   4. Do you have a history of  heart failure? No   5. Do you currently have a cold, bronchitis or symptoms of other infection? No   6. Do you have a cough, shortness of breath, or wheezing? No   7. Do you or anyone in your family have previous history of blood clots? No   8. Do you or does anyone in your family have a serious bleeding problem such as prolonged bleeding following surgeries or cuts? No   9. Have you ever had problems with anemia or been told to take iron pills? YES    10. Have you had any abnormal blood loss such as black, tarry or bloody stools, or  abnormal vaginal bleeding? No   11. Have you ever had a blood transfusion? No   12. Are you willing to have a blood transfusion if it is medically needed before, during, or after your surgery? Yes   13. Have you or any of your relatives ever had problems with anesthesia? No   14. Do you have sleep apnea, excessive snoring or daytime drowsiness? No   15. Do you have any artifical heart valves or other implanted medical devices like a pacemaker, defibrillator, or continuous glucose monitor? No   16. Do you have artificial joints? No   17. Are you allergic to latex? No   18. Is there any chance that you may be pregnant? No     Health Care Directive:  Patient does not have a Health Care Directive or Living Will: Discussed advance care planning with patient; however, patient declined at this time.    Preoperative Review of :   reviewed - no record of controlled substances prescribed.      Status of Chronic Conditions:  See problem list for active medical problems.  Problems all longstanding and stable, except as noted/documented.  See ROS for pertinent symptoms related to these conditions.      Review of Systems  CONSTITUTIONAL: NEGATIVE for fever, chills, change in weight  ENT/MOUTH: NEGATIVE for ear, mouth and throat problems  RESP: NEGATIVE for significant cough or SOB  CV: NEGATIVE for chest pain, palpitations or peripheral edema    Patient Active Problem List    Diagnosis Date Noted     ADELIA (generalized anxiety disorder) 06/08/2021     Priority: Medium     Moderate episode of recurrent major depressive disorder (H) 06/08/2021     Priority: Medium     Hypertension goal BP (blood pressure) < 140/90 07/15/2015     Priority: Medium     Vitamin D deficiency 01/03/2014     Priority: Medium     Problem list name updated by automated process. Provider to review       Uterine leiomyoma 07/13/2010     Priority: Medium     Pt underwent a abdominal myomectomy in 2010. One large fibroid removed.   (Problem list name  updated by automated process. Provider to review and confirm.)       Excessive or frequent menstruation 2010     Priority: Low     Fear of travel with panic attacks 2010     Priority: Low     Atopic rhinitis 10/22/2009     Priority: Low     (Problem list name updated by automated process. Provider to review and confirm.)       FAMILY HX PSYCHIATRIC COND anxiety, obsession 2006     Priority: Low      Past Medical History:   Diagnosis Date     Allergic rhinitis, cause unspecified      Anemia      Depressive disorder, not elsewhere classified      Esophageal reflux      Fear of travel with panic attacks 2010     Hx of previous reproductive problem     ivf pregnancy     Migraines      Personal history of urinary calculi      PONV (postoperative nausea and vomiting)      Past Surgical History:   Procedure Laterality Date      SECTION  2013    Procedure:  SECTION;   Section;  Surgeon: Lola Fuentes MD;  Location: [facility] L[facility]D     DILATION AND CURETTAGE, OPERATIVE HYSTEROSCOPY, COMBINED  2011    Procedure:COMBINED DILATION AND CURETTAGE, OPERATIVE HYSTEROSCOPY; Removal Endometrial Polyp; Surgeon:LOLA FUENTES; Location:[facility] OR     HERNIA REPAIR      Inguinal     MYOMECTOMY UTERUS  2010     MYOMECTOMY UTERUS       RHINOPLASTY       SURGICAL HISTORY OF -       rhinoplasty     Current Outpatient Medications   Medication Sig Dispense Refill     CENTRUM OR TABS 1 TABLET DAILY       cetirizine (ZYRTEC) 10 MG tablet Take 10 mg by mouth daily.       FLUoxetine (PROZAC) 40 MG capsule Take 1 capsule (40 mg) by mouth daily Needs appointment 30 capsule 0     hydrOXYzine (VISTARIL) 25 MG capsule Take 1 capsule (25 mg) by mouth 3 times daily as needed for itching 20 capsule 0     losartan (COZAAR) 100 MG tablet TAKE 1 TABLET (100 MG) BY MOUTH DAILY DUE FOR AN OFFICE VISIT 30 tablet 0     ondansetron (ZOFRAN-ODT) 4 MG ODT tab Take every 6 hours as needed for  "nausea 20 tablet 1     scopolamine (TRANSDERM) 1 MG/3DAYS 72 hr patch Place 1 patch onto the skin every 72 hours.  Apply to hairless area behind one ear at least 4 hours before travel.  Remove old patch and change every 3 days. 6 patch 2     busPIRone (BUSPAR) 5 MG tablet TAKE 1-2 TABLETS (5-10 MG) BY MOUTH 2 TIMES DAILY (Patient not taking: Reported on 8/29/2022) 120 tablet 0     Cholecalciferol (VITAMIN D) 1000 UNIT capsule Take 1 capsule by mouth daily. (Patient not taking: No sig reported)       Ferrous Sulfate (IRON SUPPLEMENT PO) Take  by mouth. (Patient not taking: No sig reported)       folic acid 800 MCG TABS Take  by mouth. (Patient not taking: No sig reported)         Allergies   Allergen Reactions     Iodine Hives     xray dye     Penicillins Hives     Dust Mites      Hydrochlorothiazide Rash     Lexapro [Escitalopram Oxalate] Rash     Ragweeds      Sulfa Drugs Rash        Social History     Tobacco Use     Smoking status: Never Smoker     Smokeless tobacco: Never Used   Substance Use Topics     Alcohol use: Yes     Comment: 1-2 on weekends     Family History   Problem Relation Age of Onset     Arthritis Mother      Thyroid Disease Mother         hypothyroidism     Hypertension Mother      Breast Cancer Mother      Depression Father      Alcohol/Drug Father      Neurologic Disorder Father         seizures     Lipids Father      Diabetes Maternal Grandmother      Heart Disease Maternal Grandfather      Heart Disease Paternal Grandmother      Genitourinary Problems Paternal Grandfather      Depression Sister      Depression Sister      History   Drug Use No         Objective     /89   Pulse 111   Temp 98  F (36.7  C) (Tympanic)   Resp 20   Ht 1.66 m (5' 5.35\")   Wt 84.6 kg (186 lb 6.4 oz)   SpO2 95%   Breastfeeding No   BMI 30.68 kg/m      Physical Exam  GENERAL APPEARANCE: healthy, alert and no distress  HENT: ear canals and TM's normal and nose and mouth without ulcers or lesions  RESP: " lungs clear to auscultation - no rales, rhonchi or wheezes  CV: regular rate and rhythm, normal S1 S2, no S3 or S4 and no murmur, click or rub   ABDOMEN: soft, nontender, no HSM or masses and bowel sounds normal  NEURO: Normal strength and tone, sensory exam grossly normal, mentation intact and speech normal    Recent Labs   Lab Test 12/13/21  1603      POTASSIUM 4.0   CR 1.00        Diagnostics:  No labs were ordered during this visit.   No EKG required for low risk surgery (cataract, skin procedure, breast biopsy, etc).    Revised Cardiac Risk Index (RCRI):  The patient has the following serious cardiovascular risks for perioperative complications:   - No serious cardiac risks = 0 points     RCRI Interpretation: 0 points: Class I (very low risk - 0.4% complication rate)           Signed Electronically by: Bruce Parks PA-C  Copy of this evaluation report is provided to requesting physician.      Answers for HPI/ROS submitted by the patient on 8/29/2022  If you checked off any problems, how difficult have these problems made it for you to do your work, take care of things at home, or get along with other people?: Not difficult at all  PHQ9 TOTAL SCORE: 0

## 2022-08-29 ENCOUNTER — OFFICE VISIT (OUTPATIENT)
Dept: FAMILY MEDICINE | Facility: CLINIC | Age: 52
End: 2022-08-29
Payer: COMMERCIAL

## 2022-08-29 VITALS
DIASTOLIC BLOOD PRESSURE: 89 MMHG | BODY MASS INDEX: 31.06 KG/M2 | OXYGEN SATURATION: 95 % | RESPIRATION RATE: 20 BRPM | TEMPERATURE: 98 F | HEART RATE: 111 BPM | HEIGHT: 65 IN | SYSTOLIC BLOOD PRESSURE: 132 MMHG | WEIGHT: 186.4 LBS

## 2022-08-29 DIAGNOSIS — H26.8 OTHER CATARACT OF RIGHT EYE: ICD-10-CM

## 2022-08-29 DIAGNOSIS — Z01.818 PREOP GENERAL PHYSICAL EXAM: Primary | ICD-10-CM

## 2022-08-29 PROCEDURE — 99214 OFFICE O/P EST MOD 30 MIN: CPT | Performed by: PHYSICIAN ASSISTANT

## 2022-08-29 ASSESSMENT — PATIENT HEALTH QUESTIONNAIRE - PHQ9
SUM OF ALL RESPONSES TO PHQ QUESTIONS 1-9: 0
SUM OF ALL RESPONSES TO PHQ QUESTIONS 1-9: 0
10. IF YOU CHECKED OFF ANY PROBLEMS, HOW DIFFICULT HAVE THESE PROBLEMS MADE IT FOR YOU TO DO YOUR WORK, TAKE CARE OF THINGS AT HOME, OR GET ALONG WITH OTHER PEOPLE: NOT DIFFICULT AT ALL

## 2022-08-29 ASSESSMENT — PAIN SCALES - GENERAL: PAINLEVEL: NO PAIN (0)

## 2022-09-13 ENCOUNTER — OFFICE VISIT (OUTPATIENT)
Dept: FAMILY MEDICINE | Facility: CLINIC | Age: 52
End: 2022-09-13
Payer: COMMERCIAL

## 2022-09-13 VITALS
RESPIRATION RATE: 16 BRPM | BODY MASS INDEX: 30.92 KG/M2 | WEIGHT: 185.6 LBS | TEMPERATURE: 98.4 F | SYSTOLIC BLOOD PRESSURE: 124 MMHG | HEIGHT: 65 IN | DIASTOLIC BLOOD PRESSURE: 80 MMHG | HEART RATE: 91 BPM | OXYGEN SATURATION: 97 %

## 2022-09-13 DIAGNOSIS — Z11.59 NEED FOR HEPATITIS C SCREENING TEST: ICD-10-CM

## 2022-09-13 DIAGNOSIS — F40.01 FEAR OF TRAVEL WITH PANIC ATTACKS: ICD-10-CM

## 2022-09-13 DIAGNOSIS — F33.1 MODERATE EPISODE OF RECURRENT MAJOR DEPRESSIVE DISORDER (H): ICD-10-CM

## 2022-09-13 DIAGNOSIS — I10 HYPERTENSION GOAL BP (BLOOD PRESSURE) < 140/90: Primary | ICD-10-CM

## 2022-09-13 DIAGNOSIS — Z12.11 SCREEN FOR COLON CANCER: ICD-10-CM

## 2022-09-13 DIAGNOSIS — Z12.31 VISIT FOR SCREENING MAMMOGRAM: ICD-10-CM

## 2022-09-13 DIAGNOSIS — F41.1 GAD (GENERALIZED ANXIETY DISORDER): ICD-10-CM

## 2022-09-13 PROCEDURE — 86803 HEPATITIS C AB TEST: CPT | Performed by: FAMILY MEDICINE

## 2022-09-13 PROCEDURE — 99214 OFFICE O/P EST MOD 30 MIN: CPT | Mod: 25 | Performed by: FAMILY MEDICINE

## 2022-09-13 PROCEDURE — 96127 BRIEF EMOTIONAL/BEHAV ASSMT: CPT | Performed by: FAMILY MEDICINE

## 2022-09-13 PROCEDURE — 80048 BASIC METABOLIC PNL TOTAL CA: CPT | Performed by: FAMILY MEDICINE

## 2022-09-13 PROCEDURE — 36415 COLL VENOUS BLD VENIPUNCTURE: CPT | Performed by: FAMILY MEDICINE

## 2022-09-13 PROCEDURE — 90682 RIV4 VACC RECOMBINANT DNA IM: CPT | Performed by: FAMILY MEDICINE

## 2022-09-13 PROCEDURE — 90471 IMMUNIZATION ADMIN: CPT | Performed by: FAMILY MEDICINE

## 2022-09-13 RX ORDER — LOSARTAN POTASSIUM 100 MG/1
100 TABLET ORAL DAILY
Qty: 90 TABLET | Refills: 1 | Status: SHIPPED | OUTPATIENT
Start: 2022-09-13 | End: 2023-04-03

## 2022-09-13 RX ORDER — HYDROXYZINE PAMOATE 25 MG/1
25 CAPSULE ORAL 3 TIMES DAILY PRN
Qty: 20 CAPSULE | Refills: 0 | Status: SHIPPED | OUTPATIENT
Start: 2022-09-13 | End: 2023-05-02

## 2022-09-13 RX ORDER — FLUOXETINE 40 MG/1
40 CAPSULE ORAL DAILY
Qty: 90 CAPSULE | Refills: 1 | Status: SHIPPED | OUTPATIENT
Start: 2022-09-13 | End: 2023-03-07

## 2022-09-13 ASSESSMENT — ANXIETY QUESTIONNAIRES
IF YOU CHECKED OFF ANY PROBLEMS ON THIS QUESTIONNAIRE, HOW DIFFICULT HAVE THESE PROBLEMS MADE IT FOR YOU TO DO YOUR WORK, TAKE CARE OF THINGS AT HOME, OR GET ALONG WITH OTHER PEOPLE: NOT DIFFICULT AT ALL
4. TROUBLE RELAXING: NOT AT ALL
1. FEELING NERVOUS, ANXIOUS, OR ON EDGE: SEVERAL DAYS
GAD7 TOTAL SCORE: 1
6. BECOMING EASILY ANNOYED OR IRRITABLE: NOT AT ALL
5. BEING SO RESTLESS THAT IT IS HARD TO SIT STILL: NOT AT ALL
7. FEELING AFRAID AS IF SOMETHING AWFUL MIGHT HAPPEN: NOT AT ALL
3. WORRYING TOO MUCH ABOUT DIFFERENT THINGS: NOT AT ALL
GAD7 TOTAL SCORE: 1
2. NOT BEING ABLE TO STOP OR CONTROL WORRYING: NOT AT ALL
8. IF YOU CHECKED OFF ANY PROBLEMS, HOW DIFFICULT HAVE THESE MADE IT FOR YOU TO DO YOUR WORK, TAKE CARE OF THINGS AT HOME, OR GET ALONG WITH OTHER PEOPLE?: NOT DIFFICULT AT ALL
GAD7 TOTAL SCORE: 1
7. FEELING AFRAID AS IF SOMETHING AWFUL MIGHT HAPPEN: NOT AT ALL

## 2022-09-13 ASSESSMENT — PATIENT HEALTH QUESTIONNAIRE - PHQ9
SUM OF ALL RESPONSES TO PHQ QUESTIONS 1-9: 1
SUM OF ALL RESPONSES TO PHQ QUESTIONS 1-9: 1

## 2022-09-13 ASSESSMENT — PAIN SCALES - GENERAL: PAINLEVEL: NO PAIN (0)

## 2022-09-13 NOTE — PROGRESS NOTES
"  Assessment & Plan     Hypertension goal BP (blood pressure) < 140/90  The current medical regimen is effective;  continue present plan and medications.    - losartan (COZAAR) 100 MG tablet; Take 1 tablet (100 mg) by mouth daily Due for an office visit  - Basic metabolic panel  (Ca, Cl, CO2, Creat, Gluc, K, Na, BUN); Future    ADELIA (generalized anxiety disorder)  The current medical regimen is effective;  continue present plan and medications.    - FLUoxetine (PROZAC) 40 MG capsule; Take 1 capsule (40 mg) by mouth daily    Moderate episode of recurrent major depressive disorder (H)  The current medical regimen is effective;  continue present plan and medications.    - FLUoxetine (PROZAC) 40 MG capsule; Take 1 capsule (40 mg) by mouth daily    Fear of travel with panic attacks  The current medical regimen is effective;  continue present plan and medications.    - hydrOXYzine (VISTARIL) 25 MG capsule; Take 1 capsule (25 mg) by mouth 3 times daily as needed for itching    Screen for colon cancer    - Colonoscopy Screening  Referral; Future    Need for hepatitis C screening test    - Hepatitis C Screen Reflex to HCV RNA Quant and Genotype; Future    Visit for screening mammogram    - MA Screen Bilateral w/Wenceslao; Future      30 minutes spent on the date of the encounter doing chart review, history and exam, documentation and further activities per the note       BMI:   Estimated body mass index is 30.65 kg/m  as calculated from the following:    Height as of this encounter: 1.657 m (5' 5.25\").    Weight as of this encounter: 84.2 kg (185 lb 9.6 oz).   Weight management plan: Discussed healthy diet and exercise guidelines      Return in about 6 months (around 3/13/2023) for Physical Exam, BP Recheck, mood recheck.    Cheryl Augustin DO  [facility]    Joss Hanson is a 52 year old, presenting for the following health issues:  Chronic Disease Management      History of Present Illness "       Hypertension: She presents for follow up of hypertension.  She does check blood pressure  regularly outside of the clinic. Outpatient blood pressures have not been over 140/90. She follows a low salt diet.      Today's PHQ-9         PHQ-9 Total Score: 1    PHQ-9 Q9 Thoughts of better off dead/self-harm past 2 weeks :   Not at all      Today's ADELIA-7 Score: 1     Home reading av's/80-90's  For hypertension she is on losartan 100 mg daily    Depression and Anxiety Follow-Up    How are you doing with your depression since your last visit? Improved     How are you doing with your anxiety since your last visit?  Improved     Are you having other symptoms that might be associated with depression or anxiety? No    Have you had a significant life event? No     Do you have any concerns with your use of alcohol or other drugs? No     Prozac 40 mg daily and hydroxyzine 25 mg as needed.  She uses this occasionally     prozac is also helping with hot flashes     Social History     Tobacco Use     Smoking status: Never Smoker     Smokeless tobacco: Never Used   Vaping Use     Vaping Use: Never used   Substance Use Topics     Alcohol use: Yes     Comment: 1-2 on weekends     Drug use: No     PHQ 2021   PHQ-9 Total Score 2 0 1   Q9: Thoughts of better off dead/self-harm past 2 weeks Not at all Not at all Not at all     ADELIA-7 SCORE 2021   Total Score - 2 (minimal anxiety) 1 (minimal anxiety)   Total Score 3 2 1     Last PHQ-9 2022   1.  Little interest or pleasure in doing things 0   2.  Feeling down, depressed, or hopeless 0   3.  Trouble falling or staying asleep, or sleeping too much 0   4.  Feeling tired or having little energy 0   5.  Poor appetite or overeating 1   6.  Feeling bad about yourself 0   7.  Trouble concentrating 0   8.  Moving slowly or restless 0   Q9: Thoughts of better off dead/self-harm past 2 weeks 0   PHQ-9 Total Score 1   Difficulty at work,  "home, or with people -     ADELIA-7  9/13/2022   1. Feeling nervous, anxious, or on edge 1   2. Not being able to stop or control worrying 0   3. Worrying too much about different things 0   4. Trouble relaxing 0   5. Being so restless that it is hard to sit still 0   6. Becoming easily annoyed or irritable 0   7. Feeling afraid, as if something awful might happen 0   ADELIA-7 Total Score 1   If you checked any problems, how difficult have they made it for you to do your work, take care of things at home, or get along with other people? Not difficult at all       Suicide Assessment Five-step Evaluation and Treatment (SAFE-T)          Review of Systems   Constitutional, HEENT, cardiovascular, pulmonary, gi and gu systems are negative, except as otherwise noted.      Objective    /80 (BP Location: Right arm, Patient Position: Sitting, Cuff Size: Adult Regular)   Pulse 91   Temp 98.4  F (36.9  C) (Oral)   Resp 16   Ht 1.657 m (5' 5.25\")   Wt 84.2 kg (185 lb 9.6 oz)   SpO2 97%   BMI 30.65 kg/m    Body mass index is 30.65 kg/m .  Physical Exam   GENERAL: healthy, alert and no distress  NECK: no adenopathy, no asymmetry, masses, or scars and thyroid normal to palpation  RESP: lungs clear to auscultation - no rales, rhonchi or wheezes  CV: regular rate and rhythm, normal S1 S2, no S3 or S4, no murmur, click or rub, no peripheral edema and peripheral pulses strong  MS: no gross musculoskeletal defects noted, no edema  PSYCH: mentation appears normal, affect normal/bright                  "

## 2022-09-14 LAB
ANION GAP SERPL CALCULATED.3IONS-SCNC: 7 MMOL/L (ref 3–14)
BUN SERPL-MCNC: 11 MG/DL (ref 7–30)
CALCIUM SERPL-MCNC: 8.6 MG/DL (ref 8.5–10.1)
CHLORIDE BLD-SCNC: 107 MMOL/L (ref 94–109)
CO2 SERPL-SCNC: 23 MMOL/L (ref 20–32)
CREAT SERPL-MCNC: 0.95 MG/DL (ref 0.52–1.04)
GFR SERPL CREATININE-BSD FRML MDRD: 72 ML/MIN/1.73M2
GLUCOSE BLD-MCNC: 93 MG/DL (ref 70–99)
HCV AB SERPL QL IA: NONREACTIVE
POTASSIUM BLD-SCNC: 4.3 MMOL/L (ref 3.4–5.3)
SODIUM SERPL-SCNC: 137 MMOL/L (ref 133–144)

## 2022-09-19 ENCOUNTER — TELEPHONE (OUTPATIENT)
Dept: GASTROENTEROLOGY | Facility: CLINIC | Age: 52
End: 2022-09-19

## 2022-09-19 ENCOUNTER — HOSPITAL ENCOUNTER (OUTPATIENT)
Facility: AMBULATORY SURGERY CENTER | Age: 52
End: 2022-09-19
Attending: INTERNAL MEDICINE
Payer: COMMERCIAL

## 2022-09-19 NOTE — TELEPHONE ENCOUNTER
Screening Questions  BLUE  KIND OF PREP RED  LOCATION [review exclusion criteria] GREEN  SEDATION TYPE        Y Are you active on mychart?       Cheryl Augustin, DO Ordering/Referring Provider?        BCBS What type of coverage do you have?      N Have you had a positive covid test in the last 90 days?     1. 30.65  BMI  [BMI 40+ - review exclusion criteria]  2. Y  Are you able to give consent for your medical care? [RN REVIEW?]        3. N  Are you taking any prescription pain medications on a routine schedule?        3a. N EXTENDED PREP What kind of prescription?   4. N Do you have any chemical dependencies such as alcohol, street drugs, or methadone?    5. N Do you have any history of post-traumatic stress syndrome, severe anxiety or history of psychosis?      **If yes 2- 5 , please schedule with MAC sedation.**    BMI OVER 40 NEED PAC EVALUATION FOR [facility]          IF YES TO ANY 6 - 10 - HOSPITAL SETTING ONLY.     6.   N Do you need assistance transferring?     7.   N Have you had a heart or lung transplant?    8.   N Are you currently on dialysis?   9.   N Do you use daily home oxygen?   10. N Do you take nitroglycerin?   10a. N If yes, how often?     11. [FEMALES]  N Are you currently pregnant?    11a. N If yes, how many weeks? [ Greater than 12 weeks, OR NEEDED]    12. N Do you have Pulmonary Hypertension? *NEED PAC APPT AT [facility]*     13. N [review exclusion criteria]  Do you have any implantable devices in your body (pacemaker, defib, LVAD)?    14. N In the past 6 months, have you had any heart related issues including cardiomyopathy or heart attack?     14a. N If yes, did it require cardiac stenting if so when?     15. N Have you had a stroke or Transient ischemic attack (TIA - aka  mini stroke ) within 6 months?      16. N Do you have mod to severe Obstructive Sleep Apnea?  [Hospital only - Ok at [facility]]    17. N Do you have SEVERE AND UNCONTROLLED asthma?     18. N Are you currently taking any blood  "thinners?     18a. If yes, inform patient to \"follow up w/ ordering provider for bridging instructions.\"    19. N Do you take the medication Phentermine?    19a. If yes, \"Hold for 7 days before procedure.  Please consult your prescribing provider if you have questions about holding this medication.\"     20. N  Do you have chronic kidney disease?      21. N  Do you have a diagnosis of diabetes?     22. N  On a regular basis do you go 3-5 days between bowel movements?     23.  Preferred LOCAL Pharmacy for Pre Prescription    [ LIST ONLY ONE PHARMACY]      [facility] [id number] [place] [facility] - [place], [place] - [address] [facility]      - CLOSING REMINDERS -    Informed patient they will need an adult    Cannot take any type of public or medical transportation alone    Conscious Sedation- Needs  for 6 hours after the procedure       MAC/General-Needs  for 24 hours after procedure    Pre-Procedure Covid test to be completed [[facility] PCR Testing Required]    Confirmed Nurse will call to complete assessment       - SCHEDULING DETAILS -     LETICIAT  Surgeon    11/21  Date of Procedure  Lower Endoscopy [Colonoscopy]  Type of Procedure Scheduled   [facility]  [facility] PREP-If you answer yes to questions #8, #20, #21Which Colonoscopy Prep was Sent?     MOD Sedation Type     N PAC / Pre-op Required         Additional comments:          "

## 2022-10-20 ENCOUNTER — OFFICE VISIT (OUTPATIENT)
Dept: FAMILY MEDICINE | Facility: CLINIC | Age: 52
End: 2022-10-20
Payer: COMMERCIAL

## 2022-10-20 VITALS
TEMPERATURE: 97.9 F | RESPIRATION RATE: 22 BRPM | WEIGHT: 190 LBS | HEART RATE: 90 BPM | DIASTOLIC BLOOD PRESSURE: 78 MMHG | OXYGEN SATURATION: 98 % | HEIGHT: 65 IN | SYSTOLIC BLOOD PRESSURE: 124 MMHG | BODY MASS INDEX: 31.65 KG/M2

## 2022-10-20 DIAGNOSIS — Z23 HIGH PRIORITY FOR 2019-NCOV VACCINE: ICD-10-CM

## 2022-10-20 DIAGNOSIS — H25.9 SENILE CATARACT OF LEFT EYE, UNSPECIFIED AGE-RELATED CATARACT TYPE: ICD-10-CM

## 2022-10-20 DIAGNOSIS — I10 HYPERTENSION GOAL BP (BLOOD PRESSURE) < 140/90: ICD-10-CM

## 2022-10-20 DIAGNOSIS — Z01.818 PRE-OP EXAM: Primary | ICD-10-CM

## 2022-10-20 PROCEDURE — 0134A COVID-19,PF,MODERNA BIVALENT: CPT | Performed by: NURSE PRACTITIONER

## 2022-10-20 PROCEDURE — 91313 COVID-19,PF,MODERNA BIVALENT: CPT | Performed by: NURSE PRACTITIONER

## 2022-10-20 PROCEDURE — 99214 OFFICE O/P EST MOD 30 MIN: CPT | Mod: 25 | Performed by: NURSE PRACTITIONER

## 2022-10-20 NOTE — PATIENT INSTRUCTIONS
Pre-operation Instructions:    - Stop Aspirin and NSAIDS (Ibuprofen, Motrin, Advil, Aleve, Naproxen, Naprosyn) 7 days prior to the surgery/procedure or follow surgeon's direction.    - Stop all Vitamins and Herbal Supplements (including Vitamin E, Fish Oil, Omega 3 Fatty Acids, Ginseng, Gingko) 7 days prior to the surgery/procedure or follow surgeon's direction.    - Medications:  Continue your medications the morning of your surgery/procedure.    - If you become sick before your surgery/procedure (such as a fever, cough, congestion, or sore throat) you should call your primary care provider and surgeon.    - Unless given permission by your surgeon, do not eat or drink after midnight in the evening prior to your surgery/procedure.

## 2022-10-20 NOTE — PROGRESS NOTES
[address] [address]  Phone: [phone]  Primary Provider: Cheryl Augustin  Pre-op Performing Provider: ABBY EVANS      PREOPERATIVE EVALUATION:  Today's date: 10/20/2022    Margie Becker is a 52 year old female who presents for a preoperative evaluation.    Surgical Information:  Surgery/Procedure:  left eye cataract   Surgery Location: [facility],    Surgeon: Dr. Torres   Surgery Date: 10/29/2022  Time of Surgery: TBD  Where patient plans to recover: At home with family  Fax number for surgical facility: [phone]    Type of Anesthesia Anticipated: Moderate Sedation    Assessment & Plan     The proposed surgical procedure is considered LOW risk.    Pre-op exam    Senile cataract of left eye, unspecified age-related cataract type  Reason for surgery    High priority for 2019-nCoV vaccine    - COVID-19,PF,MODERNA BIVALENT 18+Yrs    Hypertension goal BP (blood pressure) < 140/90  Known issue that I take into account for their medical decisions, no current exacerbations or new concerns.              Risks and Recommendations:  The patient has the following additional risks and recommendations for perioperative complications:   - No identified additional risk factors other than previously addressed    Medication Instructions:   - ACE/ARB: May be continued on the day of surgery.     RECOMMENDATION:  APPROVAL GIVEN to proceed with proposed procedure, without further diagnostic evaluation.                      Subjective     HPI related to upcoming procedure: She had right cataract removed in August and is now scheduled to get the left cataract removed.  She had Covid that delayed this procedure but it is now rescheduled for 10/29/22.    Preop Questions 10/20/2022   1. Have you ever had a heart attack or stroke? No   2. Have you ever had surgery on your heart or blood vessels, such as a stent placement, a coronary artery bypass, or  surgery on an artery in your head, neck, heart, or legs? No   3. Do you have chest pain with activity? No   4. Do you have a history of  heart failure? No   5. Do you currently have a cold, bronchitis or symptoms of other infection? No   6. Do you have a cough, shortness of breath, or wheezing? No   7. Do you or anyone in your family have previous history of blood clots? No   8. Do you or does anyone in your family have a serious bleeding problem such as prolonged bleeding following surgeries or cuts? No   9. Have you ever had problems with anemia or been told to take iron pills? YES - 9 yrs ago    10. Have you had any abnormal blood loss such as black, tarry or bloody stools, or abnormal vaginal bleeding? No   11. Have you ever had a blood transfusion? No   12. Are you willing to have a blood transfusion if it is medically needed before, during, or after your surgery? Yes   13. Have you or any of your relatives ever had problems with anesthesia? YES - nausea and vomiting   14. Do you have sleep apnea, excessive snoring or daytime drowsiness? No   15. Do you have any artifical heart valves or other implanted medical devices like a pacemaker, defibrillator, or continuous glucose monitor? No   16. Do you have artificial joints? No   17. Are you allergic to latex? No   18. Is there any chance that you may be pregnant? No       Health Care Directive:  Patient does not have a Health Care Directive or Living Will: not on file    Preoperative Review of :   reviewed - no record of controlled substances prescribed.    Status of Chronic Conditions:  See problem list for active medical problems.  Problems all longstanding and stable, except as noted/documented.  See ROS for pertinent symptoms related to these conditions.      Review of Systems  Constitutional, neuro, ENT, endocrine, pulmonary, cardiac, gastrointestinal, genitourinary, musculoskeletal, integument and psychiatric systems are negative, except as otherwise  noted.    Patient Active Problem List    Diagnosis Date Noted     ADELIA (generalized anxiety disorder) 2021     Priority: Medium     Moderate episode of recurrent major depressive disorder (H) 2021     Priority: Medium     Hypertension goal BP (blood pressure) < 140/90 07/15/2015     Priority: Medium     Vitamin D deficiency 2014     Priority: Medium     Problem list name updated by automated process. Provider to review       Uterine leiomyoma 2010     Priority: Medium     Pt underwent a abdominal myomectomy in . One large fibroid removed.   (Problem list name updated by automated process. Provider to review and confirm.)       Excessive or frequent menstruation 2010     Priority: Low     Fear of travel with panic attacks 2010     Priority: Low     Atopic rhinitis 10/22/2009     Priority: Low     (Problem list name updated by automated process. Provider to review and confirm.)       FAMILY HX PSYCHIATRIC COND anxiety, obsession 2006     Priority: Low      Past Medical History:   Diagnosis Date     Allergic rhinitis, cause unspecified      Anemia      Depressive disorder, not elsewhere classified      Esophageal reflux      Fear of travel with panic attacks 2010     Hx of previous reproductive problem     ivf pregnancy     Migraines      Personal history of urinary calculi      PONV (postoperative nausea and vomiting)      Past Surgical History:   Procedure Laterality Date     CATARACT EXTRACTION Right 2022      SECTION  2013    Procedure:  SECTION;   Section;  Surgeon: Lola Fuentes MD;  Location: [facility] L[facility]     DILATION AND CURETTAGE, OPERATIVE HYSTEROSCOPY, COMBINED  2011    Procedure:COMBINED DILATION AND CURETTAGE, OPERATIVE HYSTEROSCOPY; Removal Endometrial Polyp; Surgeon:LOLA FUENTES; Location:[facility] OR     HERNIA REPAIR  1975    Inguinal     MYOMECTOMY UTERUS  2010     MYOMECTOMY UTERUS        RHINOPLASTY       SURGICAL HISTORY OF -       rhinoplasty     Current Outpatient Medications   Medication Sig Dispense Refill     CENTRUM OR TABS 1 TABLET DAILY       cetirizine (ZYRTEC) 10 MG tablet Take 10 mg by mouth daily.       FLUoxetine (PROZAC) 40 MG capsule Take 1 capsule (40 mg) by mouth daily 90 capsule 1     hydrOXYzine (VISTARIL) 25 MG capsule Take 1 capsule (25 mg) by mouth 3 times daily as needed for itching 20 capsule 0     losartan (COZAAR) 100 MG tablet Take 1 tablet (100 mg) by mouth daily Due for an office visit 90 tablet 1     ondansetron (ZOFRAN-ODT) 4 MG ODT tab Take every 6 hours as needed for nausea 20 tablet 1     scopolamine (TRANSDERM) 1 MG/3DAYS 72 hr patch Place 1 patch onto the skin every 72 hours.  Apply to hairless area behind one ear at least 4 hours before travel.  Remove old patch and change every 3 days. 6 patch 2       Allergies   Allergen Reactions     Iodine Hives     xray dye     Penicillins Hives     Dust Mites      Hydrochlorothiazide Rash     Lexapro [Escitalopram Oxalate] Rash     Ragweeds      Sulfa Drugs Rash        Social History     Tobacco Use     Smoking status: Never     Smokeless tobacco: Never   Substance Use Topics     Alcohol use: Yes     Comment: 1-2 on weekends     Family History   Problem Relation Age of Onset     Arthritis Mother      Thyroid Disease Mother         hypothyroidism     Hypertension Mother      Breast Cancer Mother      Depression Father      Alcohol/Drug Father      Neurologic Disorder Father         seizures     Lipids Father      Diabetes Maternal Grandmother      Heart Disease Maternal Grandfather      Heart Disease Paternal Grandmother      Genitourinary Problems Paternal Grandfather      Depression Sister      Depression Sister      History   Drug Use No         Objective     /78 (BP Location: Right arm, Patient Position: Sitting, Cuff Size: Adult Large)   Pulse 90   Temp 97.9  F (36.6  C) (Oral)   Resp 22   Ht 1.659 m (5'  "5.3\")   Wt 86.2 kg (190 lb)   SpO2 98%   BMI 31.33 kg/m      Physical Exam    GENERAL APPEARANCE: healthy, alert and no distress     EYES: EOMI, PERRL     HENT: ear canals and TM's normal and nose and mouth without ulcers or lesions     NECK: no adenopathy, no asymmetry, masses, or scars and thyroid normal to palpation     RESP: lungs clear to auscultation - no rales, rhonchi or wheezes     CV: regular rates and rhythm, normal S1 S2, no S3 or S4 and no murmur, click or rub     SKIN: no suspicious lesions or rashes     NEURO: Normal strength and tone, sensory exam grossly normal, mentation intact and speech normal     PSYCH: mentation appears normal. and affect normal/bright     LYMPHATICS: No cervical adenopathy    Recent Labs   Lab Test 09/13/22  1531 12/13/21  1603    134   POTASSIUM 4.3 4.0   CR 0.95 1.00        Diagnostics:  No labs were ordered during this visit.   No EKG required for low risk surgery (cataract, skin procedure, breast biopsy, etc).    Revised Cardiac Risk Index (RCRI):  The patient has the following serious cardiovascular risks for perioperative complications:   - No serious cardiac risks = 0 points     RCRI Interpretation: 0 points: Class I (very low risk - 0.4% complication rate)           Signed Electronically by: Carleen Snowden NP  Copy of this evaluation report is provided to requesting physician.      "

## 2022-11-16 ENCOUNTER — TELEPHONE (OUTPATIENT)
Dept: GASTROENTEROLOGY | Facility: CLINIC | Age: 52
End: 2022-11-16

## 2022-11-16 DIAGNOSIS — Z12.11 ENCOUNTER FOR SCREENING COLONOSCOPY: Primary | ICD-10-CM

## 2022-11-16 RX ORDER — BISACODYL 5 MG/1
TABLET, DELAYED RELEASE ORAL
Qty: 4 TABLET | Refills: 0 | Status: SHIPPED | OUTPATIENT
Start: 2022-11-16 | End: 2023-06-29

## 2022-11-16 NOTE — TELEPHONE ENCOUNTER
Pre assessment questions completed for upcoming colonoscopy procedure scheduled on 11.21.2022    COVID policy reviewed. Patient to complete rapid antigen test one to two days before their scheduled procedure. Patient to bring photo of the results when they come in for their procedure.    Reviewed procedural arrival time 1345 and facility location [facility].    Designated  policy reviewed. Instructed to have someone stay 24 hours post procedure.     Anticoagulation/blood thinners? no    Electronic implanted devices? no    Diabetic? No    Golytely d/t mg citrate recall    Reviewed Golytely prep instructions with patient. No fiber/iron supplements or foods that contain nuts/seeds prior to procedure.     Prep instructions sent via [employer].  Prep prescription sent to    [facility] [id number] [place] [address]    Patient verbalized understanding and had no questions or concerns at this time.    Elmira Thompson RN

## 2022-11-17 ENCOUNTER — TELEPHONE (OUTPATIENT)
Dept: GASTROENTEROLOGY | Facility: CLINIC | Age: 52
End: 2022-11-17

## 2022-11-17 NOTE — TELEPHONE ENCOUNTER
Caller: Margie Becker     Procedure: colonoscopy     Date, Location, and Surgeon of Procedure Cancelled: 11/21/2022 - [facility] [place]     Ordering Provider: Charli     Reason for cancel (please be detailed, any staff messages or encounters to note?):    Per pt -- needed to take care of father         Rescheduled: yes      If rescheduled:    Date: 02/01/2023   Location: [facility]    Prep Resent: yes (changes to prep?)   Covid Test Rescheduled: **INFORMED OF HOME TESTING & LAB OPTION**     Note any change or update to original order/sedation: n/a

## 2022-11-22 NOTE — TELEPHONE ENCOUNTER
LORazepam (ATIVAN) 0.5 MG tablet 20 tablet 1 3/12/2019  No   Sig - Route: Take 0.5-1 tablets (0.25-0.5 mg) by mouth every 8 hours as needed for anxiety Take 30 minutes prior to departure.  Do not operate a vehicle after taking this medication - Oral   Class: Local Print   Order: [id number]       Last Office Visit: 1/11/2019  Future Office visit:       Routing refill request to provider for review/approval because:  Drug not on [place] [facility] [place] [facility] refill protocol or controlled substance    
Per Patient Care Supervisor, routing 10 refill requests to each provider due to being non-compliant.    Sharri Soriano RN    
Script walked to pharmacy next door.    Thanks!  Mitch Light      
The prescription is in the TC outgoing box     Cheryl Augustin D.O.      
No

## 2022-12-12 ENCOUNTER — VIRTUAL VISIT (OUTPATIENT)
Dept: FAMILY MEDICINE | Facility: CLINIC | Age: 52
End: 2022-12-12
Payer: COMMERCIAL

## 2022-12-12 DIAGNOSIS — J01.00 ACUTE MAXILLARY SINUSITIS, RECURRENCE NOT SPECIFIED: Primary | ICD-10-CM

## 2022-12-12 PROCEDURE — 99213 OFFICE O/P EST LOW 20 MIN: CPT | Mod: 95 | Performed by: FAMILY MEDICINE

## 2022-12-12 RX ORDER — AZITHROMYCIN 250 MG/1
TABLET, FILM COATED ORAL
Qty: 6 TABLET | Refills: 0 | Status: SHIPPED | OUTPATIENT
Start: 2022-12-12 | End: 2023-06-29

## 2022-12-12 NOTE — PROGRESS NOTES
Margie is a 52 year old who is being evaluated via a billable video visit.      How would you like to obtain your AVS? James  If the video visit is dropped, the invitation should be resent by:JAMES  Will anyone else be joining your video visit? No        Assessment & Plan     Acute maxillary sinusitis, recurrence not specified  Patient appears to have acute maxillary sinus infection causing her to have significant post nasal discharge which is thick causing cough, sore throat and now hoarseness of voice.  Recommending to start taking azithromycin.  Get over-the-counter Mucinex started as well.  Use more hydration.  Ibuprofen as needed for discomfort and inflammation.  - azithromycin (ZITHROMAX) 250 MG tablet; Two tablets first day, then one tablet daily for four days.      Return in about 5 days (around 12/17/2022) for If symptoms are not improving.    Maylin Rocha MD  [facility]   Margie is a 52 year old, presenting for the following health issues:  Pharyngitis (Ongoing for 6 days )      HPI     Concern - Sore Throat  Onset: 6 days ago  Description: sinus pain, thick nasal discharge, postnasal drip, sore throat, hoarse voice.  NO SOB or wheezing. No fever or chills.    Intensity: moderate  Progression of Symptoms:  worsening  Accompanying Signs & Symptoms: N.A  Previous history of similar problem: N/A   Precipitating factors:        Worsened by: N/A  Alleviating factors:        Improved by: N/A  Therapies tried and outcome: None        Review of Systems   CONSTITUTIONAL: NEGATIVE for fever, chills, change in weight  GI: NEGATIVE for nausea, abdominal pain, heartburn, or change in bowel habits      Objective    Vitals - Patient Reported  Pain Score: No Pain (0)        Physical Exam   GENERAL: Healthy, alert and no distress  EYES: Eyes grossly normal to inspection.  No discharge or erythema, or obvious scleral/conjunctival abnormalities.  RESP: No audible wheeze, cough, or  visible cyanosis.  No visible retractions or increased work of breathing.    SKIN: Visible skin clear. No significant rash, abnormal pigmentation or lesions.  NEURO: Cranial nerves grossly intact.  Mentation and speech appropriate for age.  PSYCH: Mentation appears normal, affect normal/bright, judgement and insight intact, normal speech and appearance well-groomed.                Video-Visit Details    Video Start Time: 2:00 PM    Type of service:  Video Visit    Video End Time:2:08 PM    Originating Location (pt. Location): Home    Distant Location (provider location):  On-site    Platform used for Video Visit: Chino

## 2023-01-19 ENCOUNTER — TELEPHONE (OUTPATIENT)
Dept: GASTROENTEROLOGY | Facility: CLINIC | Age: 53
End: 2023-01-19

## 2023-01-19 DIAGNOSIS — Z12.11 SPECIAL SCREENING FOR MALIGNANT NEOPLASMS, COLON: Primary | ICD-10-CM

## 2023-01-19 RX ORDER — BISACODYL 5 MG
TABLET, DELAYED RELEASE (ENTERIC COATED) ORAL
Qty: 4 TABLET | Refills: 0 | Status: SHIPPED | OUTPATIENT
Start: 2023-01-19 | End: 2023-06-29

## 2023-01-19 NOTE — TELEPHONE ENCOUNTER
Attempted to contact patient regarding upcoming Colonoscopy  procedure on 2/1/23 for pre assessment questions.     Pt answered but was in middle of a meeting. She will return call  to [phone] #4    Discuss Covid policy.     Pre op exam scheduled: N/A    Arrival time: 0700    [facility]; [address] [place] [address], [address] Floor, [place] [address]    Sedation type: Conscious sedation     Anticoagulants: No    Electronic implanted devices? No    Diabetic? No    Indication for procedure: screening colonoscopy    Bowel prep recommendation: Standard Golytely      Prep instructions sent via [employer]. Bowel prep script sent to      [place] [id number] [place] [facility] [place], [place] - [address] [facility]      Sara Damon RN  Endoscopy Procedure Pre Assessment RN

## 2023-01-25 NOTE — TELEPHONE ENCOUNTER
Second attempt for pre-assessment prior to upcoming colonoscopy     No answer.  Unable to leave message to return call.    Ariela Espana, RN  Endoscopy Procedure Pre Assessment RN

## 2023-01-26 ENCOUNTER — TELEPHONE (OUTPATIENT)
Dept: GASTROENTEROLOGY | Facility: CLINIC | Age: 53
End: 2023-01-26
Payer: COMMERCIAL

## 2023-01-26 NOTE — TELEPHONE ENCOUNTER
Caller: Margie Becker  Reason for Reschedule/Cancellation (please be detailed, any staff messages or encounters to note?): schedule conflict      Prior to reschedule please review:    Ordering Provider: Cheryl Augustin DO     Sedation per order: moderate    Does patient have any ASC Exclusions, please identify?: no      Notes on Cancelled Procedure:    Procedure:Lower Endoscopy [Colonoscopy]     Date: 02/01/23    Location:[facility]; [address] [place] [address], [place] [address]    Surgeon: Beverley        Rescheduled: Yes    Procedure: Lower Endoscopy [Colonoscopy]     Date: 04/12/23    Location:[facility]; [address] [place] [address], [place] [address]    Surgeon: Roberto    Sedation Level Scheduled  moderate,  Reason for Sedation Level per order    Prep/Instructions updated and sent: yes

## 2023-03-07 DIAGNOSIS — F33.1 MODERATE EPISODE OF RECURRENT MAJOR DEPRESSIVE DISORDER (H): ICD-10-CM

## 2023-03-07 DIAGNOSIS — F41.1 GAD (GENERALIZED ANXIETY DISORDER): ICD-10-CM

## 2023-03-07 RX ORDER — FLUOXETINE 40 MG/1
CAPSULE ORAL
Qty: 90 CAPSULE | Refills: 0 | Status: SHIPPED | OUTPATIENT
Start: 2023-03-07 | End: 2023-05-02

## 2023-03-28 ENCOUNTER — TELEPHONE (OUTPATIENT)
Dept: GASTROENTEROLOGY | Facility: CLINIC | Age: 53
End: 2023-03-28

## 2023-03-28 DIAGNOSIS — Z12.11 ENCOUNTER FOR SCREENING COLONOSCOPY: Primary | ICD-10-CM

## 2023-03-28 RX ORDER — BISACODYL 5 MG/1
TABLET, DELAYED RELEASE ORAL
Qty: 4 TABLET | Refills: 0 | Status: SHIPPED | OUTPATIENT
Start: 2023-03-28 | End: 2023-06-29

## 2023-03-28 NOTE — TELEPHONE ENCOUNTER
Patient scheduled for Colonoscopy  on 4/12/2023.     Discuss Covid policy.     Pre op exam needed? N/A    Arrival time: 1330. Procedure time 1430    [facility]; [address] [place] [address], [address] Floor, [place] [address]    Sedation type: Conscious sedation     Anticoagulations? No    Electronic implanted devices? No    Diabetic? No    Indication for procedure: screening    Bowel prep recommendation: Standard Golytely     Prep instructions sent via [employer]     Bowel prep script sent to    [facility] [id number] [place] [facility] [place], [place] - [address] [facility]    Pre visit planning completed.    Chayito Pickens RN  Endoscopy Procedure Pre Assessment RN

## 2023-03-28 NOTE — TELEPHONE ENCOUNTER
Attempted to contact patient regarding upcoming Colonoscopy  procedure on 4/12/2023 for pre assessment questions. No answer.     Left message to return call to [phone] #[phone]    Chayito Pickens RN  Endoscopy Procedure Pre Assessment RN

## 2023-04-02 DIAGNOSIS — I10 HYPERTENSION GOAL BP (BLOOD PRESSURE) < 140/90: ICD-10-CM

## 2023-04-03 RX ORDER — LOSARTAN POTASSIUM 100 MG/1
100 TABLET ORAL DAILY
Qty: 90 TABLET | Refills: 0 | Status: SHIPPED | OUTPATIENT
Start: 2023-04-03 | End: 2023-06-29

## 2023-04-05 ENCOUNTER — TELEPHONE (OUTPATIENT)
Dept: GASTROENTEROLOGY | Facility: CLINIC | Age: 53
End: 2023-04-05
Payer: COMMERCIAL

## 2023-04-05 NOTE — TELEPHONE ENCOUNTER
Caller: Margie  Reason for Reschedule/Cancellation (please be detailed, any staff messages or encounters to note?): Patient is going to be traveling for work. Does not want to reschedule at this time      Prior to reschedule please review:    Ordering Provider:Cheryl Augustin DO in [facility]    Sedation per order:moderate    Does patient have any ASC Exclusions, please identify?: n      Notes on Cancelled Procedure:    Procedure:Lower Endoscopy [Colonoscopy]     Date: 04/12/2023    [facility]; [address] [place] [address], [address] Floor, [place] [address]    Surgeon: Roberto        Rescheduled: No    Procedure:     Date:     Location:Surgeon:     Sedation Level Scheduled  ,  Reason for Sedation Level     Prep/Instructions updated and sent:

## 2023-04-05 NOTE — TELEPHONE ENCOUNTER
Rescheduled procedure     Second attempt for pre-assessment prior to upcoming colonoscopy     No answer.  Left message to return call [phone] #4    [employer] message sent        Patient scheduled for Colonoscopy  on 4/12/23.     Discuss Covid policy.     Pre op exam needed? N/A    Arrival time: 1330. Procedure time 1430    [facility]; [address] [place] [address], [address] Floor, [place] [address]    Sedation type: Conscious sedation     Anticoagulations? No    Electronic implanted devices? No    Diabetic? No    Indication for procedure: screening colonoscopy    Bowel prep recommendation: Standard Golytely     Prep instructions sent via [employer] Bowel prep script sent to    [facility] [id number] [place] [facility] [place], [place] - [address] [facility]    Pre visit planning completed.    Ariela Esapna RN  Endoscopy Procedure Pre Assessment RN

## 2023-04-08 ENCOUNTER — HEALTH MAINTENANCE LETTER (OUTPATIENT)
Age: 53
End: 2023-04-08

## 2023-05-02 ENCOUNTER — MYC REFILL (OUTPATIENT)
Dept: FAMILY MEDICINE | Facility: CLINIC | Age: 53
End: 2023-05-02
Payer: COMMERCIAL

## 2023-05-02 DIAGNOSIS — F40.01 FEAR OF TRAVEL WITH PANIC ATTACKS: ICD-10-CM

## 2023-05-02 DIAGNOSIS — F33.1 MODERATE EPISODE OF RECURRENT MAJOR DEPRESSIVE DISORDER (H): ICD-10-CM

## 2023-05-02 DIAGNOSIS — F41.1 GAD (GENERALIZED ANXIETY DISORDER): ICD-10-CM

## 2023-05-07 RX ORDER — FLUOXETINE 40 MG/1
40 CAPSULE ORAL DAILY
Qty: 90 CAPSULE | Refills: 0 | Status: SHIPPED | OUTPATIENT
Start: 2023-05-07 | End: 2023-06-29

## 2023-05-07 RX ORDER — HYDROXYZINE PAMOATE 25 MG/1
25 CAPSULE ORAL 3 TIMES DAILY PRN
Qty: 20 CAPSULE | Refills: 0 | Status: SHIPPED | OUTPATIENT
Start: 2023-05-07 | End: 2024-06-04

## 2023-05-08 DIAGNOSIS — R11.0 NAUSEA: ICD-10-CM

## 2023-05-09 RX ORDER — ONDANSETRON 4 MG/1
TABLET, ORALLY DISINTEGRATING ORAL
Qty: 20 TABLET | Refills: 0 | Status: SHIPPED | OUTPATIENT
Start: 2023-05-09 | End: 2023-05-24

## 2023-05-24 ENCOUNTER — MYC REFILL (OUTPATIENT)
Dept: FAMILY MEDICINE | Facility: CLINIC | Age: 53
End: 2023-05-24
Payer: COMMERCIAL

## 2023-05-24 DIAGNOSIS — R11.0 NAUSEA: ICD-10-CM

## 2023-05-24 RX ORDER — ONDANSETRON 4 MG/1
TABLET, ORALLY DISINTEGRATING ORAL
Qty: 20 TABLET | Refills: 0 | Status: SHIPPED | OUTPATIENT
Start: 2023-05-24 | End: 2023-08-30

## 2023-06-25 ENCOUNTER — HEALTH MAINTENANCE LETTER (OUTPATIENT)
Age: 53
End: 2023-06-25

## 2023-06-29 ENCOUNTER — OFFICE VISIT (OUTPATIENT)
Dept: FAMILY MEDICINE | Facility: CLINIC | Age: 53
End: 2023-06-29
Payer: COMMERCIAL

## 2023-06-29 VITALS
HEART RATE: 95 BPM | OXYGEN SATURATION: 98 % | SYSTOLIC BLOOD PRESSURE: 134 MMHG | DIASTOLIC BLOOD PRESSURE: 80 MMHG | TEMPERATURE: 98.7 F | BODY MASS INDEX: 29.63 KG/M2 | HEIGHT: 66 IN | RESPIRATION RATE: 20 BRPM | WEIGHT: 184.4 LBS

## 2023-06-29 DIAGNOSIS — Z11.59 SCREENING FOR VIRAL DISEASE: ICD-10-CM

## 2023-06-29 DIAGNOSIS — I10 HYPERTENSION GOAL BP (BLOOD PRESSURE) < 140/90: ICD-10-CM

## 2023-06-29 DIAGNOSIS — F33.1 MODERATE EPISODE OF RECURRENT MAJOR DEPRESSIVE DISORDER (H): ICD-10-CM

## 2023-06-29 DIAGNOSIS — Z12.4 CERVICAL CANCER SCREENING: ICD-10-CM

## 2023-06-29 DIAGNOSIS — Z12.31 ENCOUNTER FOR SCREENING MAMMOGRAM FOR BREAST CANCER: ICD-10-CM

## 2023-06-29 DIAGNOSIS — Z13.220 LIPID SCREENING: ICD-10-CM

## 2023-06-29 DIAGNOSIS — Z00.00 ROUTINE GENERAL MEDICAL EXAMINATION AT A HEALTH CARE FACILITY: Primary | ICD-10-CM

## 2023-06-29 DIAGNOSIS — Z12.11 SCREEN FOR COLON CANCER: ICD-10-CM

## 2023-06-29 DIAGNOSIS — F41.1 GAD (GENERALIZED ANXIETY DISORDER): ICD-10-CM

## 2023-06-29 PROCEDURE — 90471 IMMUNIZATION ADMIN: CPT | Performed by: FAMILY MEDICINE

## 2023-06-29 PROCEDURE — 87624 HPV HI-RISK TYP POOLED RSLT: CPT | Performed by: FAMILY MEDICINE

## 2023-06-29 PROCEDURE — 99396 PREV VISIT EST AGE 40-64: CPT | Mod: 25 | Performed by: FAMILY MEDICINE

## 2023-06-29 PROCEDURE — 99214 OFFICE O/P EST MOD 30 MIN: CPT | Mod: 25 | Performed by: FAMILY MEDICINE

## 2023-06-29 PROCEDURE — 90750 HZV VACC RECOMBINANT IM: CPT | Performed by: FAMILY MEDICINE

## 2023-06-29 PROCEDURE — 96127 BRIEF EMOTIONAL/BEHAV ASSMT: CPT | Performed by: FAMILY MEDICINE

## 2023-06-29 PROCEDURE — G0145 SCR C/V CYTO,THINLAYER,RESCR: HCPCS | Performed by: FAMILY MEDICINE

## 2023-06-29 RX ORDER — FLUOXETINE 40 MG/1
40 CAPSULE ORAL DAILY
Qty: 90 CAPSULE | Refills: 3 | Status: SHIPPED | OUTPATIENT
Start: 2023-06-29

## 2023-06-29 RX ORDER — LOSARTAN POTASSIUM 100 MG/1
100 TABLET ORAL DAILY
Qty: 90 TABLET | Refills: 1 | Status: SHIPPED | OUTPATIENT
Start: 2023-06-29 | End: 2023-12-26

## 2023-06-29 ASSESSMENT — ENCOUNTER SYMPTOMS
NAUSEA: 1
SORE THROAT: 0
JOINT SWELLING: 0
CONSTIPATION: 0
MYALGIAS: 0
HEADACHES: 0
NERVOUS/ANXIOUS: 0
HEARTBURN: 0
ARTHRALGIAS: 0
FREQUENCY: 0
PARESTHESIAS: 0
DYSURIA: 0
DIARRHEA: 0
COUGH: 0
PALPITATIONS: 0
CHILLS: 0
WEAKNESS: 0
EYE PAIN: 0
ABDOMINAL PAIN: 0
DIZZINESS: 0
BREAST MASS: 0
SHORTNESS OF BREATH: 0
HEMATURIA: 0
HEMATOCHEZIA: 0
FEVER: 0

## 2023-06-29 ASSESSMENT — ANXIETY QUESTIONNAIRES
7. FEELING AFRAID AS IF SOMETHING AWFUL MIGHT HAPPEN: NOT AT ALL
3. WORRYING TOO MUCH ABOUT DIFFERENT THINGS: NOT AT ALL
6. BECOMING EASILY ANNOYED OR IRRITABLE: NOT AT ALL
GAD7 TOTAL SCORE: 0
7. FEELING AFRAID AS IF SOMETHING AWFUL MIGHT HAPPEN: NOT AT ALL
4. TROUBLE RELAXING: NOT AT ALL
GAD7 TOTAL SCORE: 0
5. BEING SO RESTLESS THAT IT IS HARD TO SIT STILL: NOT AT ALL
8. IF YOU CHECKED OFF ANY PROBLEMS, HOW DIFFICULT HAVE THESE MADE IT FOR YOU TO DO YOUR WORK, TAKE CARE OF THINGS AT HOME, OR GET ALONG WITH OTHER PEOPLE?: NOT DIFFICULT AT ALL
1. FEELING NERVOUS, ANXIOUS, OR ON EDGE: NOT AT ALL
IF YOU CHECKED OFF ANY PROBLEMS ON THIS QUESTIONNAIRE, HOW DIFFICULT HAVE THESE PROBLEMS MADE IT FOR YOU TO DO YOUR WORK, TAKE CARE OF THINGS AT HOME, OR GET ALONG WITH OTHER PEOPLE: NOT DIFFICULT AT ALL
GAD7 TOTAL SCORE: 0
2. NOT BEING ABLE TO STOP OR CONTROL WORRYING: NOT AT ALL

## 2023-06-29 ASSESSMENT — PAIN SCALES - GENERAL: PAINLEVEL: NO PAIN (0)

## 2023-06-29 ASSESSMENT — PATIENT HEALTH QUESTIONNAIRE - PHQ9
10. IF YOU CHECKED OFF ANY PROBLEMS, HOW DIFFICULT HAVE THESE PROBLEMS MADE IT FOR YOU TO DO YOUR WORK, TAKE CARE OF THINGS AT HOME, OR GET ALONG WITH OTHER PEOPLE: NOT DIFFICULT AT ALL
SUM OF ALL RESPONSES TO PHQ QUESTIONS 1-9: 1
SUM OF ALL RESPONSES TO PHQ QUESTIONS 1-9: 1

## 2023-06-29 NOTE — PROGRESS NOTES
SUBJECTIVE:   CC: Margie is an 53 year old who presents for preventive health visit.       6/29/2023     2:13 PM   Additional Questions   Roomed by Anisa         6/29/2023     2:13 PM   Patient Reported Additional Medications   Patient reports taking the following new medications None     Healthy Habits:     Getting at least 3 servings of Calcium per day:  Yes    Bi-annual eye exam:  Yes    Dental care twice a year:  Yes    Sleep apnea or symptoms of sleep apnea:  None    Diet:  Low salt    Frequency of exercise:  2-3 days/week    Duration of exercise:  30-45 minutes    Taking medications regularly:  Yes    Medication side effects:  None    Additional concerns today:  No      Hypertension Follow-up      Do you check your blood pressure regularly outside of the clinic? Yes     Are you following a low salt diet? Yes    Are your blood pressures ever more than 140 on the top number (systolic) OR more   than 90 on the bottom number (diastolic), for example 140/90? Yes- sometimes if stressed    Depression and Anxiety Follow-Up    How are you doing with your depression since your last visit? No change    How are you doing with your anxiety since your last visit?  No change    Are you having other symptoms that might be associated with depression or anxiety? No    Have you had a significant life event? No     Do you have any concerns with your use of alcohol or other drugs? No     She still gets hot flashes at times.  She gets nauseated during hot flashes.     Social History     Tobacco Use     Smoking status: Never     Passive exposure: Never     Smokeless tobacco: Never   Vaping Use     Vaping Use: Never used   Substance Use Topics     Alcohol use: Yes     Comment: 1-2 on weekends     Drug use: No         8/29/2022     8:50 AM 9/13/2022     2:21 PM 6/29/2023     2:02 PM   PHQ   PHQ-9 Total Score 0 1 1   Q9: Thoughts of better off dead/self-harm past 2 weeks Not at all Not at all Not at all         12/13/2021     2:55  PM 9/13/2022     2:22 PM 6/29/2023     2:03 PM   ADELIA-7 SCORE   Total Score 2 (minimal anxiety) 1 (minimal anxiety) 0 (minimal anxiety)   Total Score 2 1 0         6/29/2023     2:02 PM   Last PHQ-9   1.  Little interest or pleasure in doing things 0   2.  Feeling down, depressed, or hopeless 0   3.  Trouble falling or staying asleep, or sleeping too much 1   4.  Feeling tired or having little energy 0   5.  Poor appetite or overeating 0   6.  Feeling bad about yourself 0   7.  Trouble concentrating 0   8.  Moving slowly or restless 0   Q9: Thoughts of better off dead/self-harm past 2 weeks 0   PHQ-9 Total Score 1         6/29/2023     2:03 PM   ADELIA-7    1. Feeling nervous, anxious, or on edge 0   2. Not being able to stop or control worrying 0   3. Worrying too much about different things 0   4. Trouble relaxing 0   5. Being so restless that it is hard to sit still 0   6. Becoming easily annoyed or irritable 0   7. Feeling afraid, as if something awful might happen 0   ADELIA-7 Total Score 0   If you checked any problems, how difficult have they made it for you to do your work, take care of things at home, or get along with other people? Not difficult at all       Suicide Assessment Five-step Evaluation and Treatment (SAFE-T)          Have you ever done Advance Care Planning? (For example, a Health Directive, POLST, or a discussion with a medical provider or your loved ones about your wishes): No, advance care planning information given to patient to review.  Patient plans to discuss their wishes with loved ones or provider.      Social History     Tobacco Use     Smoking status: Never     Passive exposure: Never     Smokeless tobacco: Never   Substance Use Topics     Alcohol use: Yes     Comment: 1-2 on weekends             6/29/2023     2:07 PM   Alcohol Use   Prescreen: >3 drinks/day or >7 drinks/week? No          No data to display              Reviewed orders with patient.  Reviewed health maintenance and updated  orders accordingly - Yes  BP Readings from Last 3 Encounters:   06/29/23 (!) 146/80   10/20/22 124/78   09/13/22 124/80    Wt Readings from Last 3 Encounters:   06/29/23 83.6 kg (184 lb 6.4 oz)   10/20/22 86.2 kg (190 lb)   09/13/22 84.2 kg (185 lb 9.6 oz)                    Breast Cancer Screening:    FHS-7:       8/29/2022     8:53 AM 10/20/2022     9:49 AM 6/29/2023     2:08 PM   Breast CA Risk Assessment (FHS-7)   Did any of your first-degree relatives have breast or ovarian cancer? Yes Yes No   Did any of your relatives have bilateral breast cancer? No No No   Did any man in your family have breast cancer? No No No   Did any woman in your family have breast and ovarian cancer? Yes Yes Yes   Did any woman in your family have breast cancer before age 50 y? No No No   Do you have 2 or more relatives with breast and/or ovarian cancer? No No No   Do you have 2 or more relatives with breast and/or bowel cancer? No No No     click delete button to remove this line now  Mammogram Screening: Recommended annual mammography  Pertinent mammograms are reviewed under the imaging tab.    History of abnormal Pap smear: NO - age 30-65 PAP every 5 years with negative HPV co-testing recommended      Latest Ref Rng & Units 10/20/2017     1:37 PM 10/20/2017     1:00 PM 1/3/2014    12:00 AM   PAP / HPV   PAP (Historical)  NIL   NIL    HPV 16 DNA NEG^Negative  Negative     HPV 18 DNA NEG^Negative  Negative     Other HR HPV NEG^Negative  Negative       Reviewed and updated as needed this visit by clinical staff   Tobacco  Allergies  Meds              Reviewed and updated as needed this visit by Provider                     Review of Systems   Constitutional: Negative for chills and fever.   HENT: Negative for congestion, ear pain, hearing loss and sore throat.    Eyes: Negative for pain and visual disturbance.   Respiratory: Negative for cough and shortness of breath.    Cardiovascular: Negative for chest pain, palpitations and  "peripheral edema.   Gastrointestinal: Positive for nausea. Negative for abdominal pain, constipation, diarrhea, heartburn and hematochezia.   Breasts:  Negative for tenderness, breast mass and discharge.   Genitourinary: Negative for dysuria, frequency, genital sores, hematuria, pelvic pain, urgency, vaginal bleeding and vaginal discharge.   Musculoskeletal: Negative for arthralgias, joint swelling and myalgias.   Skin: Negative for rash.   Neurological: Negative for dizziness, weakness, headaches and paresthesias.   Psychiatric/Behavioral: Negative for mood changes. The patient is not nervous/anxious.           OBJECTIVE:   BP (!) 146/80 (BP Location: Right arm, Patient Position: Sitting, Cuff Size: Adult Regular)   Pulse 95   Temp 98.7  F (37.1  C) (Oral)   Resp 20   Ht 1.67 m (5' 5.75\")   Wt 83.6 kg (184 lb 6.4 oz)   LMP 04/29/2023   SpO2 98%   BMI 29.99 kg/m    Physical Exam  GENERAL: healthy, alert and no distress  EYES: Eyes grossly normal to inspection, PERRL and conjunctivae and sclerae normal  HENT: ear canals and TM's normal, nose and mouth without ulcers or lesions  NECK: no adenopathy, no asymmetry, masses, or scars and thyroid normal to palpation  RESP: lungs clear to auscultation - no rales, rhonchi or wheezes  BREAST: normal without masses, tenderness or nipple discharge and no palpable axillary masses or adenopathy  CV: regular rate and rhythm, normal S1 S2, no S3 or S4, no murmur, click or rub, no peripheral edema and peripheral pulses strong  ABDOMEN: soft, nontender, no hepatosplenomegaly, no masses and bowel sounds normal   (female): normal female external genitalia, normal urethral meatus, vaginal mucosa pink, moist, well rugated, and normal cervix/adnexa/uterus without masses or discharge  MS: no gross musculoskeletal defects noted, no edema  SKIN: no suspicious lesions or rashes  NEURO: Normal strength and tone, mentation intact and speech normal  PSYCH: mentation appears normal, " "affect normal/bright    Diagnostic Test Results:  Labs reviewed in Epic  No results found for this or any previous visit (from the past 24 hour(s)).    ASSESSMENT/PLAN:   (Z00.00) Routine general medical examination at a health care facility  (primary encounter diagnosis)  Comment:   Plan:     (I10) Hypertension goal BP (blood pressure) < 140/90  Comment: The current medical regimen is effective;  continue present plan and medications.    Plan: losartan (COZAAR) 100 MG tablet, Basic         metabolic panel  (Ca, Cl, CO2, Creat, Gluc, K,         Na, BUN)            (F41.1) ADELIA (generalized anxiety disorder)  Comment: The current medical regimen is effective;  continue present plan and medications.    Plan: FLUoxetine (PROZAC) 40 MG capsule            (F33.1) Moderate episode of recurrent major depressive disorder (H)  Comment: The current medical regimen is effective;  continue present plan and medications.    Plan: FLUoxetine (PROZAC) 40 MG capsule            (Z12.31) Encounter for screening mammogram for breast cancer  Comment:   Plan: MA Screen Bilateral w/Wenceslao            (Z11.59) Screening for viral disease  Comment:   Plan: Hepatitis B Surface Antibody            (Z12.11) Screen for colon cancer  Comment:   Plan: Colonoscopy Screening  Referral            (Z13.220) Lipid screening  Comment:   Plan: Lipid panel reflex to direct LDL Fasting            (Z12.4) Cervical cancer screening  Comment:   Plan: Pap Screen with HPV - recommended age 30 - 65         years                    COUNSELING:  Reviewed preventive health counseling, as reflected in patient instructions       Regular exercise       Healthy diet/nutrition       Osteoporosis prevention/bone health      BMI:   Estimated body mass index is 29.99 kg/m  as calculated from the following:    Height as of this encounter: 1.67 m (5' 5.75\").    Weight as of this encounter: 83.6 kg (184 lb 6.4 oz).   Weight management plan: Discussed healthy diet and " exercise guidelines      She reports that she has never smoked. She has never been exposed to tobacco smoke. She has never used smokeless tobacco.      Cheryl Augustin DO  [facility]

## 2023-07-05 LAB
BKR LAB AP GYN ADEQUACY: NORMAL
BKR LAB AP GYN INTERPRETATION: NORMAL
BKR LAB AP HPV REFLEX: NORMAL
BKR LAB AP PREVIOUS ABNORMAL: NORMAL
PATH REPORT.COMMENTS IMP SPEC: NORMAL
PATH REPORT.COMMENTS IMP SPEC: NORMAL
PATH REPORT.RELEVANT HX SPEC: NORMAL

## 2023-07-06 LAB
HUMAN PAPILLOMA VIRUS 16 DNA: NEGATIVE
HUMAN PAPILLOMA VIRUS 18 DNA: NEGATIVE
HUMAN PAPILLOMA VIRUS FINAL DIAGNOSIS: NORMAL
HUMAN PAPILLOMA VIRUS OTHER HR: NEGATIVE

## 2023-08-23 ENCOUNTER — APPOINTMENT (OUTPATIENT)
Dept: CT IMAGING | Facility: CLINIC | Age: 53
End: 2023-08-23
Attending: EMERGENCY MEDICINE
Payer: COMMERCIAL

## 2023-08-23 ENCOUNTER — HOSPITAL ENCOUNTER (EMERGENCY)
Facility: CLINIC | Age: 53
Discharge: HOME | End: 2023-08-24
Attending: EMERGENCY MEDICINE | Admitting: EMERGENCY MEDICINE
Payer: COMMERCIAL

## 2023-08-23 VITALS
SYSTOLIC BLOOD PRESSURE: 136 MMHG | HEART RATE: 85 BPM | OXYGEN SATURATION: 97 % | DIASTOLIC BLOOD PRESSURE: 92 MMHG | TEMPERATURE: 98.2 F | RESPIRATION RATE: 16 BRPM

## 2023-08-23 DIAGNOSIS — S09.93XA FACIAL TRAUMA, INITIAL ENCOUNTER: ICD-10-CM

## 2023-08-23 DIAGNOSIS — S05.11XA TRAUMATIC HEMATOMA OF RIGHT ORBIT, INITIAL ENCOUNTER: ICD-10-CM

## 2023-08-23 DIAGNOSIS — S02.31XA: Primary | ICD-10-CM

## 2023-08-23 DIAGNOSIS — S02.85XA FRACTURE OF RIGHT ORBITAL WALL (H): ICD-10-CM

## 2023-08-23 PROCEDURE — 70486 CT MAXILLOFACIAL W/O DYE: CPT

## 2023-08-23 PROCEDURE — 96374 THER/PROPH/DIAG INJ IV PUSH: CPT

## 2023-08-23 PROCEDURE — 70450 CT HEAD/BRAIN W/O DYE: CPT

## 2023-08-23 PROCEDURE — 99284 EMERGENCY DEPT VISIT MOD MDM: CPT | Performed by: EMERGENCY MEDICINE

## 2023-08-23 PROCEDURE — 250N000011 HC RX IP 250 OP 636: Mod: JZ | Performed by: EMERGENCY MEDICINE

## 2023-08-23 PROCEDURE — 99285 EMERGENCY DEPT VISIT HI MDM: CPT | Mod: 25

## 2023-08-23 RX ORDER — ECHINACEA PURPUREA EXTRACT 125 MG
TABLET ORAL
Qty: 30 ML | Refills: 0 | Status: SHIPPED | OUTPATIENT
Start: 2023-08-23

## 2023-08-23 RX ORDER — MORPHINE SULFATE 4 MG/ML
4 INJECTION, SOLUTION INTRAMUSCULAR; INTRAVENOUS
Status: COMPLETED | OUTPATIENT
Start: 2023-08-23 | End: 2023-08-23

## 2023-08-23 RX ORDER — ERYTHROMYCIN 5 MG/G
0.5 OINTMENT OPHTHALMIC 2 TIMES DAILY
Qty: 7 G | Refills: 0 | Status: SHIPPED | OUTPATIENT
Start: 2023-08-23 | End: 2023-08-30

## 2023-08-23 RX ADMIN — MORPHINE SULFATE 4 MG: 4 INJECTION INTRAVENOUS at 23:22

## 2023-08-23 ASSESSMENT — VISUAL ACUITY
OS: 20/50;WITHOUT CORRECTIVE LENSES
OD: 20/40;WITHOUT CORRECTIVE LENSES

## 2023-08-23 ASSESSMENT — ACTIVITIES OF DAILY LIVING (ADL)
ADLS_ACUITY_SCORE: 35
ADLS_ACUITY_SCORE: 35

## 2023-08-24 ENCOUNTER — TELEPHONE (OUTPATIENT)
Dept: OTOLARYNGOLOGY | Facility: CLINIC | Age: 53
End: 2023-08-24
Payer: COMMERCIAL

## 2023-08-24 RX ORDER — HYDROCODONE BITARTRATE AND ACETAMINOPHEN 5; 325 MG/1; MG/1
1 TABLET ORAL EVERY 6 HOURS PRN
Qty: 15 TABLET | Refills: 0 | Status: SHIPPED | OUTPATIENT
Start: 2023-08-24 | End: 2023-08-24

## 2023-08-24 RX ORDER — ERYTHROMYCIN 5 MG/G
0.5 OINTMENT OPHTHALMIC AT BEDTIME
Qty: 4 G | Refills: 0 | Status: SHIPPED | OUTPATIENT
Start: 2023-08-24 | End: 2023-08-24

## 2023-08-24 RX ORDER — ERYTHROMYCIN 5 MG/G
0.5 OINTMENT OPHTHALMIC AT BEDTIME
Qty: 4 G | Refills: 0 | Status: SHIPPED | OUTPATIENT
Start: 2023-08-24

## 2023-08-24 RX ORDER — HYDROCODONE BITARTRATE AND ACETAMINOPHEN 5; 325 MG/1; MG/1
1 TABLET ORAL EVERY 6 HOURS PRN
Qty: 15 TABLET | Refills: 0 | Status: SHIPPED | OUTPATIENT
Start: 2023-08-24 | End: 2023-08-29

## 2023-08-24 ASSESSMENT — ACTIVITIES OF DAILY LIVING (ADL): ADLS_ACUITY_SCORE: 35

## 2023-08-24 NOTE — TELEPHONE ENCOUNTER
FUTURE VISIT INFORMATION      FUTURE VISIT INFORMATION:  Date: 9/1/23  Time: 12  Location: [facility]  REFERRAL INFORMATION:  Referring provider:  Winter Villanueva MD  Referring providers clinic:  [facility]   Reason for visit/diagnosis  Fracture of right orbital wall - Referred by Winter Villanueva MD     RECORDS REQUESTED FROM:       Clinic name Comments Records Status Imaging Status   [facility]  8/23/23- 8/24/23- ED VISIT WITH Winter Villanueva MD EPIC     IMAGING  8/23/23- CT FACIAL BONES, CT HEAD  EPIC  PACS

## 2023-08-24 NOTE — CONSULTS
OPHTHALMOLOGY CONSULT NOTE  23    Patient: Margie Becker  Consulted by: ED  Reason for Consult: orbital floor fracture, extraconal hemorrhage    HISTORY OF PRESENTING ILLNESS:     Margie Becker is a 53 year old female who presented to ED after a fall at home. Patient slipped in the shower and hit the right side of her face on the side of the tub. She had a bloody nose and noted that her right eye was painful and swollen.     She denies any flashes, floaters, loss of vision or blurry vision. Has some mild pain with eye movement in upgaze. Notes significant swelling as well.    Review of systems were otherwise negative except for that which has been stated above.      OCULAR/MEDICAL/SURGICAL HISTORIES:     Past Ocular History:  Prior eye surgery/laser: Hx of CEIOL both eyes  Glasses: Wears readers, forgot them today  Eyedrops: None    Family History:  None    Social History:  Here with family members    Past Medical History:   Diagnosis Date    Allergic rhinitis, cause unspecified     Anemia     Depressive disorder, not elsewhere classified     Esophageal reflux     Fear of travel with panic attacks 2010    Hx of previous reproductive problem     ivf pregnancy    Migraines     Personal history of urinary calculi     PONV (postoperative nausea and vomiting)        Past Surgical History:   Procedure Laterality Date    CATARACT EXTRACTION Right 2022     SECTION  2013    Procedure:  SECTION;   Section;  Surgeon: Lola Fuentes MD;  Location: [facility] L+D    DILATION AND CURETTAGE, OPERATIVE HYSTEROSCOPY, COMBINED  2011    Procedure:COMBINED DILATION AND CURETTAGE, OPERATIVE HYSTEROSCOPY; Removal Endometrial Polyp; Surgeon:LOLA FUENTES; Location:[facility] OR    HERNIA REPAIR      Inguinal    MYOMECTOMY UTERUS  2010    MYOMECTOMY UTERUS      RHINOPLASTY      SURGICAL HISTORY OF -       rhinoplasty       EXAMINATION:     Base Eye Exam       Visual Acuity  (Snellen - Linear)         Right Left    Near sc 20/25, PH 20/20-1 20/20-1              Tonometry (Tonopen, 11:09 PM)         Right Left    Pressure 21 18              Pupils         Pupils APD    Right PERRL None    Left PERRL None              Visual Fields         Left Right     Full     Restrictions  Partial outer superior temporal, superior nasal deficiencies   Right eye limited by edema             Extraocular Movement         Right Left     Full, Ortho Full, Ortho              Dilation       Both eyes: 1.0% Mydriacyl, 2.5% John Paul Synephrine @ 11:10 PM                  Slit Lamp and Fundus Exam       External Exam         Right Left    External Periorbital edema and ecchymoses Normal              Slit Lamp Exam         Right Left    Lids/Lashes periorbital edema and ecchymoses Normal    Conjunctiva/Sclera tr nisreen White and quiet    Cornea Clear Clear    Anterior Chamber Deep and quiet Deep and quiet    Iris Round and reactive Round and reactive    Lens PCIOL PCIOL    Anterior Vitreous Normal Normal              Fundus Exam         Right Left    Disc Normal, tilted Normal, tilted    C/D Ratio 0.3 0.3    Macula Normal, no heme or commotio Normal, no heme or commotio    Vessels Normal Normal    Periphery Normal, no heme or commotio Normal, no heme or commotio                    Labs/Studies/Imaging Performed:  CT head without contrast/CT facial bones (8/23/23):  IMPRESSION:  HEAD CT:  1.  No acute intracranial hemorrhage or calvarial fracture.     FACIAL BONE CT:  1.  Right orbital floor fracture.  2.  Small amount of extraconal hemorrhage/inflammation in the inferior right orbit as well as a small to moderate amount of intraorbital gas associated with the fracture. There is mild right proptosis.     ASSESSMENT/PLAN:     Margie Becker is a 53 year old female who presents with     # Right orbital floor fracture  # Mild extraconal hemorrhage, inferior right orbit  -Date of injury: 8/23/23  -Mechanism of injury: fall in  the shower, hit face on side of tub  -Visual acuity 20/20 OU. Pupil exam normal, without APD. Intraocular pressure normal.  -Extraocular motility full with mild pain in upgaze ; mild limitation likely 2/2 periorbital edema. No evidence of muscle entrapment (non-resolving bradycardia, heart block, nausea/vomiting, or syncope).  -Anterior exam notable for periorbital edema and ecchymoses, otherwise unremarkable  -Dilated exam unremarkable.  -CT scan without contrast (8/23/23) shows right floor fracture and mild extraconal hemorrhage  -Globe intact on exam and on imaging.  -No signs of orbital compartment syndrome. No APD. IOP WNL, no obvious proptosis. No resistance to retropulsion, lids are not tense.    Plan:  -Erythromycin ophthalmic ointment BID prn x 5 d into the eye  -Artificial tears prn  -Nasal precautions for 4-6 wk; no nose blowing, strenuous activity, usage of straws, coughing, holding sneezes  -Ice packs to the eyelids for 20 minutes q1-2 hr for 24-48 hr.  -Follow-up in eye clinic in 7-10 days; patient would prefer to follow up with her home ophthalmologist. She will call our clinic to arrange follow up if not able to follow up with her previous eye care provider.  -Please do not hesitate to contact the on-call resident should any questions/concerns arise.  -counseled return precautions    It is our pleasure to participate in this patient's care and treatment. Please contact us with any further questions or concerns.      Conchis Charles MD  Resident Physician, PGY-3  Department of Ophthalmology

## 2023-08-24 NOTE — TELEPHONE ENCOUNTER
[facility] Call Center    Phone Message    May a detailed message be left on voicemail: yes     Reason for Call: Appointment Intake    Referring Provider Name: Winter Villanueva MD   Diagnosis and/or Symptoms: Fracture of right orbital wall (H)     Per [place] on priority line, send TE    Action Taken: Message routed to:  [facility] ([facility] ENT    Travel Screening: Not Applicable

## 2023-08-24 NOTE — DISCHARGE INSTRUCTIONS
You have a fracture of the bones below your right eye from the fall.   You need to follow up with ENT and the eye specialist.     Please make an appointment to follow up with [facility] (phone: [phone]) and Eye Clinic (phone: [phone]) in 7-10 days.        Do not blow your nose.   If you need to sneeze, please open your mouth.   Use the saline nasal spray twice a day.   Please follow up with ENT-they will call to schedule an appointment.     Use ice packs to the eyelids for 20 minutes every 2 hours while awake for the next 48 hours.     Use erythromycin ointment in your eye for next 5 days.

## 2023-08-24 NOTE — ED PROVIDER NOTES
"ED Provider Note  [facility]      History     Chief Complaint   Patient presents with    Head Injury     Onset one hour ago, \"I slipped and fell in the shower,\" hit right side of face on tub, swelling above right eye, bloody nose resolved, slight nausea.     HPI  Margie Becker is a 53 year old female with PMH of cataract and HTN (taking losartan) who presents to the ED for evaluation following a fall. About an hour ago she fell in the shower. She states she does not know how she slipped, she did not feel faint prior. She hit the right side of her head on the side of the tub. Immediately after she had a bloody nose, which stopped on its own. Her right eye is swollen and tender, and she says it hurts to look up. She is also having pain on the upper right side of her skull. She says she has nausea rather often and had some while in waiting room for ED. She also says her mouth feels very dry. No abdominal pain. She notes taking prozac in addition to her BP med.     Past Medical History  Past Medical History:   Diagnosis Date    Allergic rhinitis, cause unspecified     Anemia     Depressive disorder, not elsewhere classified     Esophageal reflux     Fear of travel with panic attacks 2010    Hx of previous reproductive problem     ivf pregnancy    Migraines     Personal history of urinary calculi     PONV (postoperative nausea and vomiting)      Past Surgical History:   Procedure Laterality Date    CATARACT EXTRACTION Right 2022     SECTION  2013    Procedure:  SECTION;   Section;  Surgeon: Lola Fuentes MD;  Location: [facility]    DILATION AND CURETTAGE, OPERATIVE HYSTEROSCOPY, COMBINED  2011    Procedure:COMBINED DILATION AND CURETTAGE, OPERATIVE HYSTEROSCOPY; Removal Endometrial Polyp; Surgeon:LOLA FUENTES; Location:[facility] OR    HERNIA REPAIR      Inguinal    MYOMECTOMY UTERUS  2010    MYOMECTOMY UTERUS      RHINOPLASTY   "    SURGICAL HISTORY OF -       rhinoplasty     CENTRUM OR TABS  cetirizine (ZYRTEC) 10 MG tablet  FLUoxetine (PROZAC) 40 MG capsule  hydrOXYzine (VISTARIL) 25 MG capsule  losartan (COZAAR) 100 MG tablet  ondansetron (ZOFRAN ODT) 4 MG ODT tab      Allergies   Allergen Reactions    Iodine Hives     xray dye    Penicillins Hives    Dust Mites     Hydrochlorothiazide Rash    Lexapro [Escitalopram Oxalate] Rash    Ragweeds     Sulfa Antibiotics Rash     Family History  Family History   Problem Relation Age of Onset    Arthritis Mother     Thyroid Disease Mother         hypothyroidism    Hypertension Mother     Breast Cancer Mother     Depression Father     Alcohol/Drug Father     Neurologic Disorder Father         seizures    Lipids Father     Diabetes Maternal Grandmother     Heart Disease Maternal Grandfather     Heart Disease Paternal Grandmother     Genitourinary Problems Paternal Grandfather     Depression Sister     Depression Sister      Social History   Social History     Tobacco Use    Smoking status: Never     Passive exposure: Never    Smokeless tobacco: Never   Vaping Use    Vaping Use: Never used   Substance Use Topics    Alcohol use: Yes     Comment: 1-2 on weekends    Drug use: No      Past medical history, past surgical history, medications, allergies, family history, and social history were reviewed with the patient. No additional pertinent items.      A medically appropriate review of systems was performed with pertinent positives and negatives noted in the HPI, and all other systems negative.    Physical Exam   BP: 133/87  Pulse: 104  Temp: 98.2  F (36.8  C)  Resp: 16  SpO2: 100 %  Physical Exam  HENT:      Head: No raccoon eyes.      Jaw: There is normal jaw occlusion.        Comments: EOM intact bilaterally; no laceration     Nose: No nasal deformity, septal deviation, mucosal edema or rhinorrhea.      Right Nostril: No septal hematoma.      Left Nostril: No septal hematoma.      Mouth/Throat:       Mouth: Mucous membranes are moist.      Tongue: No lesions.   Eyes:      Pupils: Pupils are equal, round, and reactive to light.      Comments: Pain with upward gaze on the right side but able to fully move pupil   Pulmonary:      Effort: Pulmonary effort is normal. No respiratory distress.   Abdominal:      General: Bowel sounds are normal. There is no distension.      Tenderness: There is no abdominal tenderness.   Neurological:      General: No focal deficit present.      Mental Status: She is oriented to person, place, and time.   Psychiatric:         Mood and Affect: Mood normal.         Behavior: Behavior normal.         Thought Content: Thought content normal.           ED Course, Procedures, & Data      Procedures       Results for orders placed or performed during the hospital encounter of 08/23/23   CT Head w/o Contrast     Status: None    Narrative    EXAM: CT HEAD W/O CONTRAST, CT FACIAL BONES WITHOUT CONTRAST  LOCATION: [facility]  DATE: 8/23/2023    INDICATION: Fall, head injury, face injury.  COMPARISON: None.  TECHNIQUE:   1) Routine CT Head without IV contrast. Multiplanar reformats. Dose reduction techniques were used.  2) Routine CT Facial Bones without IV contrast. Multiplanar reformats. Dose reduction techniques were used.    FINDINGS:  HEAD CT:   INTRACRANIAL CONTENTS: No intracranial hemorrhage, extraaxial collection, or mass effect. No CT evidence of acute infarct. Normal parenchymal density for age. The ventricles and sulci are normal for age.     OSSEOUS STRUCTURES/SOFT TISSUES: No significant abnormality.     FACIAL BONE CT:  OSSEOUS STRUCTURES/SOFT TISSUES: Right periorbital soft tissue swelling. Right orbital floor fracture with displaced fracture fragment extending into the upper right maxillary sinus. Small to moderate amount of intraorbital gas as well as a small amount   of intraorbital hemorrhage or inflammation in the inferior  extraconal space. Herniation of small amount of intraorbital fat through the fracture defect. No evidence for dental trauma or periapical abscess. Linear mineralization overlying the nasal bridge   and extending along the midline of the nose.    ORBITAL CONTENTS: As above, small to moderate amount of intraorbital gas in the right orbit associated with the orbital floor fracture. Small amount of orbital hemorrhage or inflammation in the inferior extraconal space. Mild proptosis of the right   globe.    SINUSES: Layering fluid or hemorrhage in the right maxillary sinus.      Impression    IMPRESSION:  HEAD CT:  1.  No acute intracranial hemorrhage or calvarial fracture.    FACIAL BONE CT:  1.  Right orbital floor fracture.  2.  Small amount of extraconal hemorrhage/inflammation in the inferior right orbit as well as a small to moderate amount of intraorbital gas associated with the fracture. There is mild right proptosis.     CT Facial Bones without Contrast     Status: None    Narrative    EXAM: CT HEAD W/O CONTRAST, CT FACIAL BONES WITHOUT CONTRAST  LOCATION: [facility]  DATE: 8/23/2023    INDICATION: Fall, head injury, face injury.  COMPARISON: None.  TECHNIQUE:   1) Routine CT Head without IV contrast. Multiplanar reformats. Dose reduction techniques were used.  2) Routine CT Facial Bones without IV contrast. Multiplanar reformats. Dose reduction techniques were used.    FINDINGS:  HEAD CT:   INTRACRANIAL CONTENTS: No intracranial hemorrhage, extraaxial collection, or mass effect. No CT evidence of acute infarct. Normal parenchymal density for age. The ventricles and sulci are normal for age.     OSSEOUS STRUCTURES/SOFT TISSUES: No significant abnormality.     FACIAL BONE CT:  OSSEOUS STRUCTURES/SOFT TISSUES: Right periorbital soft tissue swelling. Right orbital floor fracture with displaced fracture fragment extending into the upper right maxillary sinus. Small to  moderate amount of intraorbital gas as well as a small amount   of intraorbital hemorrhage or inflammation in the inferior extraconal space. Herniation of small amount of intraorbital fat through the fracture defect. No evidence for dental trauma or periapical abscess. Linear mineralization overlying the nasal bridge   and extending along the midline of the nose.    ORBITAL CONTENTS: As above, small to moderate amount of intraorbital gas in the right orbit associated with the orbital floor fracture. Small amount of orbital hemorrhage or inflammation in the inferior extraconal space. Mild proptosis of the right   globe.    SINUSES: Layering fluid or hemorrhage in the right maxillary sinus.      Impression    IMPRESSION:  HEAD CT:  1.  No acute intracranial hemorrhage or calvarial fracture.    FACIAL BONE CT:  1.  Right orbital floor fracture.  2.  Small amount of extraconal hemorrhage/inflammation in the inferior right orbit as well as a small to moderate amount of intraorbital gas associated with the fracture. There is mild right proptosis.       Medications   morphine (PF) injection 4 mg (4 mg Intravenous $Given 8/23/23 4678)                    No results found for any visits on 08/23/23.  Medications - No data to display  Labs Ordered and Resulted from Time of ED Arrival to Time of ED Departure - No data to display  No orders to display          Critical care was not performed.     Medical Decision Making  The patient's presentation was of high complexity (an acute health issue posing potential threat to life or bodily function).    The patient's evaluation involved:  ordering and/or review of 2 test(s) in this encounter (see separate area of note for details)  discussion of management or test interpretation with another health professional (ENT attending and Ophtho resident)    The patient's management necessitated moderate risk (prescription drug management including medications given in the ED).    Assessment &  Plan    Patient is a healthy 53-year-old female who presents to the ER due to a slip and fall injury in the tub.  Patient is a pending the right side of her face on the bathtub.  Patient has an eye contusion with swelling of the eyelid both upper and lower.  Plan will be to obtain a CT head and CT face.    Patient had a CT head which is negative.  CT face shows orbital floor fracture of the right eye.  Patient has no obvious entrapment.  Patient also had a small amount of blood around her right eye.  I initially discussed the case with facial trauma who is ENT.  They recommend follow-up in clinic after reviewing the CT.  This was done by Dr. Murdock.  Due to the concern for hemorrhage on the eye patient was seen by ophthalmology.  Ophthalmology recommends outpatient follow-up with erythromycin ointment twice a day.  Patient's vision is intact.  Patient be discharged home with outpatient follow-up.    I have reviewed the nursing notes. I have reviewed the findings, diagnosis, plan and need for follow up with the patient.    New Prescriptions    No medications on file       Final diagnoses:   Facial trauma, initial encounter   Fracture of right orbital wall (H)   Traumatic hematoma of right orbit, initial encounter   IRoss, am serving as a trained medical scribe to document services personally performed by Winter Villanueva MD based on the provider's statements to me on August 23, 2023.  This document has been checked and approved by the attending provider.    I, Winter Villanueva MD, was physically present and have reviewed and verified the accuracy of this note documented by Ross Butler medical scribe.      Winter Villanueva MD  [facility] EMERGENCY DEPARTMENT  8/23/2023     Winter Villanueva MD  08/23/23 7827

## 2023-08-30 DIAGNOSIS — R11.0 NAUSEA: ICD-10-CM

## 2023-08-30 RX ORDER — ONDANSETRON 4 MG/1
TABLET, ORALLY DISINTEGRATING ORAL
Qty: 18 TABLET | Refills: 1 | Status: SHIPPED | OUTPATIENT
Start: 2023-08-30 | End: 2024-06-04

## 2023-09-01 ENCOUNTER — PRE VISIT (OUTPATIENT)
Dept: OTOLARYNGOLOGY | Facility: CLINIC | Age: 53
End: 2023-09-01

## 2023-09-01 ENCOUNTER — OFFICE VISIT (OUTPATIENT)
Dept: OTOLARYNGOLOGY | Facility: CLINIC | Age: 53
End: 2023-09-01
Attending: EMERGENCY MEDICINE
Payer: COMMERCIAL

## 2023-09-01 VITALS
BODY MASS INDEX: 31.14 KG/M2 | WEIGHT: 186.9 LBS | DIASTOLIC BLOOD PRESSURE: 85 MMHG | SYSTOLIC BLOOD PRESSURE: 138 MMHG | HEART RATE: 89 BPM | OXYGEN SATURATION: 97 % | HEIGHT: 65 IN

## 2023-09-01 DIAGNOSIS — S02.85XA FRACTURE OF RIGHT ORBITAL WALL (H): ICD-10-CM

## 2023-09-01 PROCEDURE — 99204 OFFICE O/P NEW MOD 45 MIN: CPT | Performed by: STUDENT IN AN ORGANIZED HEALTH CARE EDUCATION/TRAINING PROGRAM

## 2023-09-01 ASSESSMENT — PAIN SCALES - GENERAL: PAINLEVEL: MILD PAIN (3)

## 2023-09-01 NOTE — PROGRESS NOTES
Facial Plastic and Reconstructive Surgery Consultation    Referring Provider:   Winter Villanueva MD  [address] [place] [address]    HPI:   I had the pleasure of seeing Margie Becker today in clinic for consultation for facial trauma. Margie Becker is a 53 year old female.     She suffered facial trauma on 23 resulting in an orbital floor fracture. She fell in the shower. Today, she reports she is doing much better than immediately after the fall.  She reports that she has less pain with looking up.  She has no double vision, blurry vision, or difficulties with her vision.  An ophthalmologist saw her in the ER and she has another appointment with an ophthalmologist closer to home next week.  She has some decreased sensation in the right cheek and upper lip, but this has been improving.  She has been adhering to sinus precautions.  She has had some blood from her nose since the injury.  She has never had an injury like this before.  She did have a rhinoplasty many years ago.  No other symptoms of facial trauma, she has no malocclusion.        Review Of Systems  ROS: 10 point ROS neg other than the symptoms noted above in the HPI.    Patient Active Problem List   Diagnosis    FAMILY HX PSYCHIATRIC COND anxiety, obsession    Atopic rhinitis    Fear of travel with panic attacks    Excessive or frequent menstruation    Uterine leiomyoma    Vitamin D deficiency    Hypertension goal BP (blood pressure) < 140/90    ADELIA (generalized anxiety disorder)    Moderate episode of recurrent major depressive disorder (H)     Past Surgical History:   Procedure Laterality Date    CATARACT EXTRACTION Right 2022     SECTION  2013    Procedure:  SECTION;   Section;  Surgeon: Lola Pérez MD;  Location:  L+D    DILATION AND CURETTAGE, OPERATIVE HYSTEROSCOPY, COMBINED  2011    Procedure:COMBINED DILATION AND CURETTAGE, OPERATIVE HYSTEROSCOPY; Removal  Endometrial Polyp; Surgeon:ARIS FUENTES; Location:[facility] OR    HERNIA REPAIR  1975    Inguinal    MYOMECTOMY UTERUS  07/2010    MYOMECTOMY UTERUS      RHINOPLASTY      SURGICAL HISTORY OF -       rhinoplasty     Current Outpatient Medications   Medication Sig Dispense Refill    CENTRUM OR TABS 1 TABLET DAILY      cetirizine (ZYRTEC) 10 MG tablet Take 10 mg by mouth daily.      erythromycin (ROMYCIN) 5 MG/GM ophthalmic ointment Place 0.5 inches into the right eye At Bedtime 4 g 0    FLUoxetine (PROZAC) 40 MG capsule Take 1 capsule (40 mg) by mouth daily 90 capsule 3    HYDROcodone-acetaminophen (NORCO) 5-325 MG tablet Take 1 tablet by mouth every 6 hours as needed for pain 15 tablet 0    hydrOXYzine (VISTARIL) 25 MG capsule Take 1 capsule (25 mg) by mouth 3 times daily as needed for itching 20 capsule 0    losartan (COZAAR) 100 MG tablet Take 1 tablet (100 mg) by mouth daily Due for an office visit 90 tablet 1    ondansetron (ZOFRAN ODT) 4 MG ODT tab TAKE 1 TABLET BY MOUTH EVERY 6 HOURS AS NEEDED FOR NAUSEA 18 tablet 1    sodium chloride (OCEAN) 0.65 % nasal spray Sig: use the saline nasal wash twice a day to keep the passages moist 30 mL 0     Iodine, Penicillins, Dust mites, Hydrochlorothiazide, Lexapro [escitalopram oxalate], Ragweeds, and Sulfa antibiotics  Social History     Socioeconomic History    Marital status:      Spouse name: Not on file    Number of children: Not on file    Years of education: Not on file    Highest education level: Not on file   Occupational History    Not on file   Tobacco Use    Smoking status: Never     Passive exposure: Never    Smokeless tobacco: Never   Vaping Use    Vaping Use: Never used   Substance and Sexual Activity    Alcohol use: Yes     Comment: 1-2 on weekends    Drug use: No    Sexual activity: Yes     Partners: Male   Other Topics Concern    Parent/sibling w/ CABG, MI or angioplasty before 65F 55M? No   Social History Narrative    Caffeine intake/servings  daily - 1per week    Calcium intake/servings daily - 3    Exercise 3 times weekly - describe cardio, walking    Sunscreen used - Yes    Seatbelts used - Yes    Guns stored in the home - No    Self Breast Exam - Yes    Pap test up to date -  Yes    Eye exam up to date -  Yes    Dental exam up to date -  No    DEXA scan up to date -  Not Applicable    Flex Sig/Colonoscopy up to date -  Not Applicable    Mammography up to date -  Not Applicable    Immunizations reviewed and up to date - Yes    Abuse: Current or Past (Physical, Sexual or Emotional) - No    Do you feel safe in your environment - Yes    Do you cope well with stress - Yes    Do you suffer from insomnia - No    Last updated by: Ana Laura Chan  5/5/2009     Social Determinants of Health     Financial Resource Strain: Not on file   Food Insecurity: Not on file   Transportation Needs: Not on file   Physical Activity: Not on file   Stress: Not on file   Social Connections: Not on file   Intimate Partner Violence: Not on file   Housing Stability: Not on file     Family History   Problem Relation Age of Onset    Arthritis Mother     Thyroid Disease Mother         hypothyroidism    Hypertension Mother     Breast Cancer Mother     Depression Father     Alcohol/Drug Father     Neurologic Disorder Father         seizures    Lipids Father     Diabetes Maternal Grandmother     Heart Disease Maternal Grandfather     Heart Disease Paternal Grandmother     Genitourinary Problems Paternal Grandfather     Depression Sister     Depression Sister        PE:  Alert and Oriented, Answering Questions Appropriately  Atraumatic, Normocephalic, Face Symmetric  Skin: Duarte 2  Facial Nerve Intact and facial movement symmetric  Decreased sensation in right V2 distribution, but she can still feel.   EOMI without any evidence of entrapment.  No double or blurry vision.  She has resolving periorbital ecchymosis and edema on the right.  Nasal Exam: No external Deformity, no septal  hematoma  Chin: Normal   Lips/Teeth/Toungue/Gums: Lips intact  Neck: Trachea midline  Chest: No wheezing, cyanosis, or stridor  Card: not diaphoretic  Neuro/Psych: CN's 2-12 intact, Moves all extremities, ambulation in intact, positive affect, no notable muscle weakness        Imaging:CT max/face from date of injury is reviewed and this shows a right orbital floor fracture with displacement of the bony fractures into the maxillary sinus. No CT evidence of entrapment.       IMPRESSION/PLAN: Margie Becker is a 53 year old female who is 9 days out from facial trauma resulting in an orbital floor fracture. She she continues to do much better.  She has some decrease sensation on the right that is improving.  No double or blurry vision.  No evidence of entrapment on examination. She does have decreased sensation in the right V2 distribution.  I went over her images with her.  She has a fracture with displacement of the bony fragments on the right orbital floor.  Orbital rim is intact.  This does involve the infraorbital nerve but there is no obvious entrapment of this nerve on imaging.  Risks and benefits of an orbital floor fracture repair were discussed with the patient including but not limited to bleeding, infection, damage to the eye muscles, damage to vision, need for additional procedures, entrapment.  We discussed that given she is not having any vision issues right now, observation is reasonable and that is her preference.  She does have a visit with ophthalmology next week.  I told her if she develops any double vision or vision difficulties in the next week I want to know about it right away is that mean that she may need a repair.  Otherwise, no follow-up is needed.  We discussed continuing sinus precautions for another 4 to 6 weeks.      Photodocumentation was obtained.

## 2023-09-01 NOTE — NURSING NOTE
"Blood pressure 138/85, pulse 89, height 1.651 m (5' 5\"), weight 84.8 kg (186 lb 14.4 oz), SpO2 97 %, not currently breastfeeding.  Jeniffer Hansen    "

## 2023-09-01 NOTE — Clinical Note
9/1/2023       RE: Margie Becker  [address] [address]     Dear Colleague,    Thank you for referring your patient, Margie Becker, to the [facility] [place] [facility]. Please see a copy of my visit note below.    Facial Plastic and Reconstructive Surgery Consultation    Referring Provider:   Winter Villanueva MD  [address] [place] [address]    HPI:   I had the pleasure of seeing Margie Becker today in clinic for consultation for facial trauma. Margie Becker is a 53 year old female.     She suffered facial trauma on 8/23/23 resulting in an orbital floor fracture. She fell in the shower. Today, she reports she is doing much better than immediately after the fall.  She reports that she has less pain with looking up.  She has no double vision, blurry vision, or difficulties with her vision.  An ophthalmologist saw her in the ER and she has another appointment with an ophthalmologist closer to home next week.  She has some decreased sensation in the right cheek and upper lip, but this has been improving.  She has been adhering to sinus precautions.  She has had some blood from her nose since the injury.  She has never had an injury like this before.  She did have a rhinoplasty many years ago.  No other symptoms of facial trauma, she has no malocclusion.        Review Of Systems  ROS: 10 point ROS neg other than the symptoms noted above in the HPI.    Patient Active Problem List   Diagnosis    FAMILY HX PSYCHIATRIC COND anxiety, obsession    Atopic rhinitis    Fear of travel with panic attacks    Excessive or frequent menstruation    Uterine leiomyoma    Vitamin D deficiency    Hypertension goal BP (blood pressure) < 140/90    ADELIA (generalized anxiety disorder)    Moderate episode of recurrent major depressive disorder (H)     Past Surgical History:   Procedure Laterality Date    CATARACT  EXTRACTION Right 2022     SECTION  2013    Procedure:  SECTION;   Section;  Surgeon: Lola Fuentes MD;  Location: [facility] L+D    DILATION AND CURETTAGE, OPERATIVE HYSTEROSCOPY, COMBINED  2011    Procedure:COMBINED DILATION AND CURETTAGE, OPERATIVE HYSTEROSCOPY; Removal Endometrial Polyp; Surgeon:LOLA FUENTES; Location:[facility] OR    HERNIA REPAIR  1975    Inguinal    MYOMECTOMY UTERUS  2010    MYOMECTOMY UTERUS      RHINOPLASTY      SURGICAL HISTORY OF -       rhinoplasty     Current Outpatient Medications   Medication Sig Dispense Refill    CENTRUM OR TABS 1 TABLET DAILY      cetirizine (ZYRTEC) 10 MG tablet Take 10 mg by mouth daily.      erythromycin (ROMYCIN) 5 MG/GM ophthalmic ointment Place 0.5 inches into the right eye At Bedtime 4 g 0    FLUoxetine (PROZAC) 40 MG capsule Take 1 capsule (40 mg) by mouth daily 90 capsule 3    HYDROcodone-acetaminophen (NORCO) 5-325 MG tablet Take 1 tablet by mouth every 6 hours as needed for pain 15 tablet 0    hydrOXYzine (VISTARIL) 25 MG capsule Take 1 capsule (25 mg) by mouth 3 times daily as needed for itching 20 capsule 0    losartan (COZAAR) 100 MG tablet Take 1 tablet (100 mg) by mouth daily Due for an office visit 90 tablet 1    ondansetron (ZOFRAN ODT) 4 MG ODT tab TAKE 1 TABLET BY MOUTH EVERY 6 HOURS AS NEEDED FOR NAUSEA 18 tablet 1    sodium chloride (OCEAN) 0.65 % nasal spray Sig: use the saline nasal wash twice a day to keep the passages moist 30 mL 0     Iodine, Penicillins, Dust mites, Hydrochlorothiazide, Lexapro [escitalopram oxalate], Ragweeds, and Sulfa antibiotics  Social History     Socioeconomic History    Marital status:      Spouse name: Not on file    Number of children: Not on file    Years of education: Not on file    Highest education level: Not on file   Occupational History    Not on file   Tobacco Use    Smoking status: Never     Passive exposure: Never    Smokeless tobacco: Never    Vaping Use    Vaping Use: Never used   Substance and Sexual Activity    Alcohol use: Yes     Comment: 1-2 on weekends    Drug use: No    Sexual activity: Yes     Partners: Male   Other Topics Concern    Parent/sibling w/ CABG, MI or angioplasty before 65F 55M? No   Social History Narrative    Caffeine intake/servings daily - 1per week    Calcium intake/servings daily - 3    Exercise 3 times weekly - describe cardio, walking    Sunscreen used - Yes    Seatbelts used - Yes    Guns stored in the home - No    Self Breast Exam - Yes    Pap test up to date -  Yes    Eye exam up to date -  Yes    Dental exam up to date -  No    DEXA scan up to date -  Not Applicable    Flex Sig/Colonoscopy up to date -  Not Applicable    Mammography up to date -  Not Applicable    Immunizations reviewed and up to date - Yes    Abuse: Current or Past (Physical, Sexual or Emotional) - No    Do you feel safe in your environment - Yes    Do you cope well with stress - Yes    Do you suffer from insomnia - No    Last updated by: Ana Laura Chan  5/5/2009     Social Determinants of Health     Financial Resource Strain: Not on file   Food Insecurity: Not on file   Transportation Needs: Not on file   Physical Activity: Not on file   Stress: Not on file   Social Connections: Not on file   Intimate Partner Violence: Not on file   Housing Stability: Not on file     Family History   Problem Relation Age of Onset    Arthritis Mother     Thyroid Disease Mother         hypothyroidism    Hypertension Mother     Breast Cancer Mother     Depression Father     Alcohol/Drug Father     Neurologic Disorder Father         seizures    Lipids Father     Diabetes Maternal Grandmother     Heart Disease Maternal Grandfather     Heart Disease Paternal Grandmother     Genitourinary Problems Paternal Grandfather     Depression Sister     Depression Sister        PE:  Alert and Oriented, Answering Questions Appropriately  Atraumatic, Normocephalic, Face Symmetric  Skin:  Duarte 2  Facial Nerve Intact and facial movement symmetric  Decreased sensation in right V2 distribution, but she can still feel.   EOMI without any evidence of entrapment.  No double or blurry vision.  She has resolving periorbital ecchymosis and edema on the right.  Nasal Exam: No external Deformity, no septal hematoma  Chin: Normal   Lips/Teeth/Toungue/Gums: Lips intact  Neck: Trachea midline  Chest: No wheezing, cyanosis, or stridor  Card: not diaphoretic  Neuro/Psych: CN's 2-12 intact, Moves all extremities, ambulation in intact, positive affect, no notable muscle weakness        Imaging:CT max/face from date of injury is reviewed and this shows a right orbital floor fracture with displacement of the bony fractures into the maxillary sinus. No CT evidence of entrapment.       IMPRESSION/PLAN: Margie Becker is a 53 year old female who is 9 days out from facial trauma resulting in an orbital floor fracture. She she continues to do much better.  She has some decrease sensation on the right that is improving.  No double or blurry vision.  No evidence of entrapment on examination. She does have decreased sensation in the right V2 distribution.  I went over her images with her.  She has a fracture with displacement of the bony fragments on the right orbital floor.  Orbital rim is intact.  This does involve the infraorbital nerve but there is no obvious entrapment of this nerve on imaging.  Risks and benefits of an orbital floor fracture repair were discussed with the patient including but not limited to bleeding, infection, damage to the eye muscles, damage to vision, need for additional procedures, entrapment.  We discussed that given she is not having any vision issues right now, observation is reasonable and that is her preference.  She does have a visit with ophthalmology next week.  I told her if she develops any double vision or vision difficulties in the next week I want to know about it right away is  that mean that she may need a repair.  Otherwise, no follow-up is needed.  We discussed continuing sinus precautions for another 4 to 6 weeks.      Photodocumentation was obtained.       Again, thank you for allowing me to participate in the care of your patient.      Sincerely,    Ailin Padilla MD

## 2023-09-12 ENCOUNTER — VIRTUAL VISIT (OUTPATIENT)
Dept: FAMILY MEDICINE | Facility: CLINIC | Age: 53
End: 2023-09-12
Payer: COMMERCIAL

## 2023-09-12 ENCOUNTER — TELEPHONE (OUTPATIENT)
Dept: OPHTHALMOLOGY | Facility: CLINIC | Age: 53
End: 2023-09-12
Payer: COMMERCIAL

## 2023-09-12 DIAGNOSIS — Z12.11 SCREEN FOR COLON CANCER: Primary | ICD-10-CM

## 2023-09-12 DIAGNOSIS — S02.85XD CLOSED FRACTURE OF ORBIT WITH ROUTINE HEALING, SUBSEQUENT ENCOUNTER: ICD-10-CM

## 2023-09-12 PROCEDURE — 99214 OFFICE O/P EST MOD 30 MIN: CPT | Mod: VID | Performed by: FAMILY MEDICINE

## 2023-09-12 NOTE — TELEPHONE ENCOUNTER
[facility] Call Center    Phone Message    May a detailed message be left on voicemail: yes     Reason for Call: Appointment Intake    Referring Provider Name: ED  Diagnosis and/or Symptoms: Fractured right orbital lobe.     Needing hospital follow up please call to advise.     Action Taken: Other: eye    Travel Screening: Not Applicable

## 2023-09-12 NOTE — PROGRESS NOTES
Margie is a 53 year old who is being evaluated via a billable video visit.      How would you like to obtain your AVS? MyChart  If the video visit is dropped, the invitation should be resent by: Text to cell phone: [phone]  Will anyone else be joining your video visit? No          Assessment & Plan     Closed fracture of orbit with routine healing, subsequent encounter  The patient has been told she does not need follow-up with her ENT/plastic surgery.  However she has concerns regarding colds, allergies, and other sinus issues going forward.  I suggested she see a general ENT for more information, I referred her to therapy Jin to see Dr. Bedolla or [facility]  - Adult ENT  Referral; Future      30 minutes spent by me on the date of the encounter doing chart review, history and exam, documentation and further activities per the note     MED REC REQUIRED  Post Medication Reconciliation Status: discharge medications reconciled, continue medications without change      Cheryl Augustin DO  [facility]   Margie is a 53 year old, presenting for the following health issues:  ER F/U ([facility] 8/28/23)        9/12/2023    12:16 PM   Additional Questions   Roomed by Anisa         9/12/2023    12:16 PM   Patient Reported Additional Medications   Patient reports taking the following new medications none       History of Present Illness       Reason for visit:  Fracture of right orbital eye bone  Symptom onset:  3-4 weeks ago  Symptoms include:  Getting better, swollen, pain, reduced sensation on noses and upper lip  Symptom intensity:  Moderate  Symptom progression:  Improving  Had these symptoms before:  No  What makes it worse:  Physical activity, coughing, sneeze, hard to sleep sometimes  What makes it better:  Ice pack, rest, stopped taking prescription pain meds around Sept 1, now use just Tylenol or Advil    She eats 2-3 servings of fruits and vegetables daily.She consumes 1  sweetened beverage(s) daily.She exercises with enough effort to increase her heart rate 10 to 19 minutes per day.  She exercises with enough effort to increase her heart rate 5 days per week.   She is taking medications regularly.       ED/UC Followup:    Facility:  [facility]  Date of visit: 8/28/23  Reason for visit: Fall/ head injury  Current Status: doing better            Review of Systems   Constitutional, HEENT, cardiovascular, pulmonary, gi and gu systems are negative, except as otherwise noted.      Objective           Vitals:  No vitals were obtained today due to virtual visit.    Physical Exam   GENERAL: Healthy, alert and no distress  EYES: Eyes grossly normal to inspection.  No discharge or erythema, or obvious scleral/conjunctival abnormalities.  RESP: No audible wheeze, cough, or visible cyanosis.  No visible retractions or increased work of breathing.    SKIN: Visible skin clear. No significant rash, abnormal pigmentation or lesions.  NEURO: Cranial nerves grossly intact.  Mentation and speech appropriate for age.  PSYCH: Mentation appears normal, affect normal/bright, judgement and insight intact, normal speech and appearance well-groomed.                Video-Visit Details    Type of service:  Video Visit     Originating Location (pt. Location): Home    Distant Location (provider location):  Off-site  Platform used for Video Visit: Shaser

## 2023-09-12 NOTE — TELEPHONE ENCOUNTER
Follow-up in eye clinic in 7-10 days; patient would prefer to follow up with her home ophthalmologist. She will call our clinic to arrange follow up if not able to follow up with her previous eye care provider.     Above per 8- eye note/ED ophthalmology consult    Note to patient communicator to reach out to review scheduling options-- ok to schedule in acute clinic this week if pt has not seen eye provider since 8- ED visit    Omero Shah RN 12:44 PM 09/12/23

## 2023-09-12 NOTE — TELEPHONE ENCOUNTER
Called and spoke to Margie     Made an appointment for 9/13 @ 230 pm with Dr. Rosario or fractured orbital fracture     Discussed     Billing / insurance    Wait time     Parking /cost     Appointment information will be on my chart      Criselda Iverson Communication Facilitator on 9/12/2023 at 1:12 PM

## 2023-09-13 ENCOUNTER — OFFICE VISIT (OUTPATIENT)
Dept: OPHTHALMOLOGY | Facility: CLINIC | Age: 53
End: 2023-09-13
Attending: OPHTHALMOLOGY
Payer: COMMERCIAL

## 2023-09-13 ENCOUNTER — MYC MEDICAL ADVICE (OUTPATIENT)
Dept: FAMILY MEDICINE | Facility: CLINIC | Age: 53
End: 2023-09-13
Payer: COMMERCIAL

## 2023-09-13 ENCOUNTER — TELEPHONE (OUTPATIENT)
Dept: PLASTIC SURGERY | Facility: CLINIC | Age: 53
End: 2023-09-13

## 2023-09-13 ENCOUNTER — TELEPHONE (OUTPATIENT)
Dept: OTOLARYNGOLOGY | Facility: CLINIC | Age: 53
End: 2023-09-13
Payer: COMMERCIAL

## 2023-09-13 DIAGNOSIS — S02.31XD FRACTURE OF ORBITAL FLOOR, RIGHT SIDE, SUBSEQUENT ENCOUNTER FOR FRACTURE WITH ROUTINE HEALING: ICD-10-CM

## 2023-09-13 DIAGNOSIS — D31.20 ACQUIRED MELANOCYTIC NEVUS OF RETINA: Primary | ICD-10-CM

## 2023-09-13 PROCEDURE — 99213 OFFICE O/P EST LOW 20 MIN: CPT

## 2023-09-13 PROCEDURE — 99212 OFFICE O/P EST SF 10 MIN: CPT | Mod: GC | Performed by: OPHTHALMOLOGY

## 2023-09-13 ASSESSMENT — TONOMETRY
IOP_METHOD: TONOPEN
OS_IOP_MMHG: 18
OD_IOP_MMHG: 17

## 2023-09-13 ASSESSMENT — VISUAL ACUITY
OD_SC+: -2
METHOD: SNELLEN - LINEAR
OS_SC: 20/20
OD_SC: 20/20

## 2023-09-13 ASSESSMENT — CONF VISUAL FIELD
OS_SUPERIOR_TEMPORAL_RESTRICTION: 0
OD_INFERIOR_TEMPORAL_RESTRICTION: 0
OD_INFERIOR_NASAL_RESTRICTION: 0
OD_NORMAL: 1
OS_INFERIOR_TEMPORAL_RESTRICTION: 0
METHOD: COUNTING FINGERS
OS_NORMAL: 1
OD_SUPERIOR_NASAL_RESTRICTION: 0
OD_SUPERIOR_TEMPORAL_RESTRICTION: 0
OS_SUPERIOR_NASAL_RESTRICTION: 0
OS_INFERIOR_NASAL_RESTRICTION: 0

## 2023-09-13 ASSESSMENT — CUP TO DISC RATIO: OD_RATIO: 0.3

## 2023-09-13 ASSESSMENT — SLIT LAMP EXAM - LIDS
COMMENTS: NORMAL
COMMENTS: NORMAL

## 2023-09-13 ASSESSMENT — EXTERNAL EXAM - RIGHT EYE: OD_EXAM: NORMAL

## 2023-09-13 ASSESSMENT — EXTERNAL EXAM - LEFT EYE: OS_EXAM: NORMAL

## 2023-09-13 NOTE — TELEPHONE ENCOUNTER
Spoke with pt that from our end she is okay to be working, while following sinus precautions for  4-6 wks as recommended by Dr. Padilla. And as she is not having surgery, or a condition that would require her to not work, it is not appropriate for us to fill out FMLA at this time. She stated a different dr told her she should be off of work, instructed pt to follow up with that office per their recommendation if that is something she would like to pursue.

## 2023-09-13 NOTE — TELEPHONE ENCOUNTER
[facility] Call Center    Phone Message    May a detailed message be left on voicemail: yes     Reason for Call: Other: Maris Knott absence management is calling to verify STD with Dr Padilla team.  Forms were faxed to [facility] - Please call back  [phone] ext [phone].  [facility] location,  Thanks     Action Taken: Other: ENT    Travel Screening: Not Applicable

## 2023-09-13 NOTE — NURSING NOTE
Chief Complaints and History of Present Illnesses   Patient presents with    Orbital Fracture Evaluation     New patient Right orbital floor fracture 08/23/23.  History of : PCIOL each eye 2022 with Dr. Omreo Torres, corneal scratch right eye      Chief Complaint(s) and History of Present Illness(es)       Orbital Fracture Evaluation              Onset: new    Duration: 3 weeks    Associated signs and symptoms: trauma, eye pain, dry eyes and difficulty chewing.  Negative for blurred vision, double vision, lid lesion, lid swelling, unequal pupils, neck pain and difficulty swallowing    Comments: New patient Right orbital floor fracture 08/23/23.  History of : PCIOL each eye 2022 with Dr. Omero Torres, corneal scratch right eye              Comments    Eye meds: nasal saline wash occasionally, AT's right eye   Patient fell while in shower and injured right eye.  ER visit 08/23/23 with CT/MRI.  Today has numbness in nose.  Teeth feel bruised constantly.  Tender under right eye and cheekbone.  Certain movement with right eye when looking up that causes pain.  Swelling of upper right brow area.  No flashes of light, floaters either eye.    KIM Greenwood 9/13/2023 2:47 PM

## 2023-09-13 NOTE — PROGRESS NOTES
Ophthalmology Acute Clinic     Chief Complaint(s) and History of Present Illness(es)    No visit information to display           HPI:   Margie Becker is a 53 year old female who presents for right orbital floor fracture follow up. She was initially seen in the ED on  after falling in the shower and hitting her face on the side of the tub. At that time, she was noted to have 20/20 vision, mild upgaze restriction due to pain, and no signs of entrapment. CT confirmed right orbital floor fracture and mild extraconal hemorrhage. Discharged on erythromycin ointment BID for 5 days PRN and artificial tears PRN, as well as sinus precautions.     Since , patient's symptoms have remained stable. Overall she has no visual changes and no double vision. She has some numbness under her eye and along her nose. Does feel some pain behind her eye. Denies pain with eye movement, flashes, floaters, or loss of vision. Left eye is asymptomatic.     No results found for: A1C    Past Ocular history:   Prior eye surgery/laser: Hx of CEIOL both eyes  Glasses: Wears readers, forgot them today  Eyedrops: None    PMH:   Past Medical History:   Diagnosis Date    Allergic rhinitis, cause unspecified     Anemia     Depressive disorder, not elsewhere classified     Esophageal reflux     Fear of travel with panic attacks 2010    Hx of previous reproductive problem     ivf pregnancy    Hypertension 2012    Migraines     Personal history of urinary calculi     PONV (postoperative nausea and vomiting)        FH: No glaucoma or AMD.     Review of systems for the eyes was negative other than the pertinent positives/negatives listed in the HPI.      Imagin/23  CT head and facial bones  IMPRESSION:  HEAD CT:  1.  No acute intracranial hemorrhage or calvarial fracture.     FACIAL BONE CT:  1.  Right orbital floor fracture.  2.  Small amount of extraconal hemorrhage/inflammation in the inferior right orbit as well as a small to moderate  amount of intraorbital gas associated with the fracture. There is mild right proptosis    Assessment & Plan      Margie Becker is a 53 year old female with the following diagnoses:     #Right orbital floor fracture  Initial injury 8/23, CT confirmed right orbital floor fracture. No double vision or enophthalmos on exam today. VA 20/20. Residual infraorbital nerve distribution numbness.   Plan:  - Follow up PRN, return precautions provided  - Will follow up with primary ophthalmologist at [facility]    #Retinal nevus, left eye  Seen incidentally on fundus exam inferiorly. Obtained fundus photos.  Plan:  - Follow up 3-6 months to monitor nevus    Patient disposition:   Return in about 3 months (around 12/13/2023) for 3-6 month follow up for retinal nevus.    Patient seen with Dr. Gongora    Thank you for entrusting us with your care  Mirza Rosario MD  Resident Physician, PGY-2  Department of Ophthalmology  09/13/23 12:55 PM    Attending Physician Attestation:  Complete documentation of historical and exam elements from today's encounter can be found in the full encounter summary report (not reduplicated in this progress note).  I personally obtained the chief complaint(s) and history of present illness.  I confirmed and edited as necessary the review of systems, past medical/surgical history, family history, social history, and examination findings as documented by others; and I examined the patient myself.  I personally reviewed the relevant tests, images, and reports as documented above.  I formulated and edited as necessary the assessment and plan and discussed the findings and management plan with the patient and family.  - Braeden Gongora MD

## 2023-12-24 DIAGNOSIS — I10 HYPERTENSION GOAL BP (BLOOD PRESSURE) < 140/90: ICD-10-CM

## 2023-12-26 RX ORDER — LOSARTAN POTASSIUM 100 MG/1
100 TABLET ORAL DAILY
Qty: 90 TABLET | Refills: 0 | Status: SHIPPED | OUTPATIENT
Start: 2023-12-26 | End: 2024-03-27

## 2024-01-11 ENCOUNTER — TELEPHONE (OUTPATIENT)
Dept: FAMILY MEDICINE | Facility: CLINIC | Age: 54
End: 2024-01-11

## 2024-01-11 ENCOUNTER — VIRTUAL VISIT (OUTPATIENT)
Dept: FAMILY MEDICINE | Facility: CLINIC | Age: 54
End: 2024-01-11
Payer: COMMERCIAL

## 2024-01-11 DIAGNOSIS — U07.1 INFECTION DUE TO 2019 NOVEL CORONAVIRUS: Primary | ICD-10-CM

## 2024-01-11 PROCEDURE — 99441 PR PHYSICIAN TELEPHONE EVALUATION 5-10 MIN: CPT | Mod: 93 | Performed by: PHYSICIAN ASSISTANT

## 2024-01-11 NOTE — PROGRESS NOTES
"Margie is a 53 year old who is being evaluated via a billable telephone visit.      What phone number would you like to be contacted at? [phone]  How would you like to obtain your AVS? Lilliana    Distant Location (provider location):  On-site    Assessment & Plan   Infection due to 2019 novel coronavirus    Patient is a 53 YOF who presents to clinic due to symptoms of COVID-19 starting 5 day(s) ago with subsequent positive test results.  Patient is able to speak in full sentences-no signs of respiratory distress. Per CDC criteria, patient qualifies for prescribed treatment of COVID19 as patient meets high risk criteria. Discussed treatment options for COVID-19 as well as risks and benefits.  Patient elects to proceed with Paxlovid.  Discussed home medications and possible interactions.  Also discussed OTC options for managing symptoms of COVID-19.  Return and urgent/emergent follow-up precautions provided.  - nirmatrelvir and ritonavir (PAXLOVID) 300 mg/100 mg therapy pack; Take 3 tablets by mouth 2 times daily for 5 days (Take 2 Nirmatrelvir tablets and 1 Ritonavir tablet twice daily for 5 days)      COVID-19 positive patient.  Encounter for consideration of medication intervention. Patient does qualify for a prescription. Full discussion with patient including medication options, risks and benefits. Potential drug interactions reviewed with patient.     Treatment Planned  Paxlovid    Temporary change to home medications: None    Estimated body mass index is 31.1 kg/m  as calculated from the following:    Height as of 9/1/23: 1.651 m (5' 5\").    Weight as of 9/1/23: 84.8 kg (186 lb 14.4 oz).  GFR Estimate   Date Value Ref Range Status   09/13/2022 72 >60 mL/min/1.73m2 Final     Comment:     Effective December 21, 2021 eGFRcr in adults is calculated using the 2021 CKD-EPI creatinine equation which includes age and gender (Darwin et al., NEJM, DOI: 10.1056/FWADte1462473)   02/04/2020 85 >60 mL/min/[1.73_m2] Final " "    Comment:     Non  GFR Calc  Starting 12/18/2018, serum creatinine based estimated GFR (eGFR) will be   calculated using the Chronic Kidney Disease Epidemiology Collaboration   (CKD-EPI) equation.       No results found for: \"RBVJB95RHI\"    Macey Caceres PA-C  [facility] EMY Hanson is a 53 year old, presenting for the following health issues:  Covid Concern      HPI       COVID-19 Symptom Review  How many days ago did these symptoms start? Tested positive 1/8/24 with symptoms starting 1/6/24    Are any of the following symptoms significant for you?  New or worsening difficulty breathing? No  Worsening cough? Yes, it's a dry cough.   Fever or chills? Yes, I felt feverish or had chills.-highest 103  Headache: No  Sore throat: YES  Chest pain: No  Diarrhea: YES  Body aches? YES    What treatments has patient tried? Acetaminophen and Nonsteroidals   Does patient live in a nursing home, group home, or shelter? No  Does patient have a way to get food/medications during quarantined? Yes, I have a friend or family member who can help me.            Objective           Vitals:  No vitals were obtained today due to virtual visit.    Physical Exam   healthy, alert, and no distress  PSYCH: Alert and oriented times 3; coherent speech, normal   rate and volume, able to articulate logical thoughts, able   to abstract reason, no tangential thoughts, no hallucinations   or delusions  Her affect is normal  RESP: Intermittent cough, no audible wheezing, able to talk in full sentences  Remainder of exam unable to be completed due to telephone visits                Phone call duration: 9 minutes      "

## 2024-01-11 NOTE — TELEPHONE ENCOUNTER
Patient and  called in regards to paxlovid tx. Patient states she thought COVID symptoms possibly started 1/6/24. RN explained that this would put patient at day 6 of symptoms and RN could not prescribe paxlovid. RN scheduled patient for telephone appointment with BE provider Macey Caceres for today at 3:30 pm to discuss tx options and symptoms.       Rosemarie Shah RN on 1/11/2024 at 2:35 PM      RN COVID TREATMENT VISIT  01/11/24      The patient has been triaged and does not require a higher level of care.    Margie Becker  53 year old  Current weight? 180    Has the patient been seen by a primary care provider at an [facility] or [facility] within the past two years? Yes.   Have you been in close proximity to/do you have a known exposure to a person with a confirmed case of influenza? No.     General treatment eligibility:  Date of positive COVID test (PCR or at home)?  1/8/24 home test     Are you or have you been hospitalized for this COVID-19 infection? No.   Have you received monoclonal antibodies or antiviral treatment for COVID-19 since this positive test? No.   Do you have any of the following conditions that place you at risk of being very sick from COVID-19?   - Age 50 years or older  - Heart conditions such as cardiomyopathies, congenital heart defects, coronary artery disease, heart arrhythmias, heart failure, hypertension, valve disorders   - Mental health disorders including mood disorders, depression, schizophrenia spectrum disorders   Yes, patient has at least one high risk condition as noted above.     Current COVID symptoms:   - fever or chills  - cough  - fatigue  - sore throat  - nausea or vomiting  Yes. Patient has at least one symptom as selected.     How many days since symptoms started? 6-7 days.   Is patient at least 16 years old? Yes  Does patient meet one of the following?   - RN is unsure if the patient qualifies based on the criteria provided.  (See Paxlovid Standing Order Policy for full list)    Yes, patient meets criteria or RN is unable to determine based on the information provided. Patient informed they will need to see a provider to discuss treatment options and will be scheduled with a provider or transferred to a  at the end of this call.    Rosemarie Shah RN

## 2024-01-11 NOTE — PATIENT INSTRUCTIONS
Jamar Hanson,      Based on your health history you do qualify for treatment of COVID-19.  For treatment you have been prescribed Paxlovid.  Paxlovid is a combined antiviral medication that reduces risk of hospitalization or severe COVID by 90%.  It is important that you  this medication and start it right away today.  The medication was sent to your pharmacy.    While taking Paxlovid, you can continue medications as prescribed.    You may continue to use Tylenol/ibuprofen for fevers, headache, body ache.  You can also use over-the-counter decongestants and cough suppressants to help manage symptoms.    If you develop any severe symptoms of medication reaction or COVID-19 including chest pain, coughing up blood, shortness of breath, swelling, rashes, or any other severe symptoms, please call 911/go to the emergency department.    Please reach out with any questions or concerns.  Take Care,  Macey Caceres PA-C    COVID-19 Outpatient Treatments  Your care team can help you find the best treatments for COVID-19. Talk to a health care provider or refer to the FDA medicine fact sheets below.  Paxlovid (nirmatrelvir and ritonavir): https://www.paxlovid.com/resources  Molnupiravir (Lagevrio): https://www.fda.gov/media/212296/download  Important: We can only prescribe Paxlovid or Molnupiravir when it can be started within 5 days of first having symptoms.  Paxlovid (nimatrelvir and ritonavir)  How it works  Two medicines (nirmatrelvir and ritonavir) are taken together. They stop the virus from growing. Less amount of virus is easier for your body to fight.  Benefits  Lowers risk of a hospital stay or death from COVID-19.  How to take  Medicine comes in a daily container with both medicine tablets. Take by mouth twice daily (once in the morning, once at night) for 5 days.  The number of tablets to take varies by patient.  Don't chew or break capsules. Swallow whole.  When to take  Take it as soon as possible and within 5  days of your first symptoms.  Who can take it  Patients must be 12 years or older weigh at least 88 pounds. Paxlovid is the preferred treatment for pregnant patients.  Possible side effects  Can cause altered sense of taste, diarrhea (loose, watery stools), high blood pressure, muscle aches.  Medicine conflicts  Some medicines may conflict with Paxlovid and may cause serious side effects.  Tell your care team about all the medicines you take, including prescription and over-the-counter medicines, vitamins, and herbal supplements.  Your care team will review your medicines to make sure that you can safely take Paxlovid.  Cautions  Paxlovid is not advised for patients with severe kidney or liver disease. If you have kidney or liver problems, the dose may need to be changed.  If you're pregnant or breastfeeding, talk to your care team about your options.  If you take hormonal birth control (such as the Pill), then you or your partner should also use a non-hormonal form of birth control (such as a condom). Keep doing this for 1 menstrual cycle after your last dose of Paxlovid.  Molnupiravir (lagevrio)  How it works  Stops the virus from growing. Less amount of virus is easier for your body to fight.  Benefits  Lowers risk of a hospital stay or death from COVID-19.  How to take  Take 4 capsules by mouth every 12 hours (4 in the morning and 4 at night) for 5 days. Don't chew or break capsules. Swallow whole.  When to take  Take as soon as possible and within 5 days of your first symptoms.  Who can take it  Patients must be 18 years or older.   Possible side effects  Diarrhea (loose, watery stools), nausea (feeling sick to your stomach), dizziness, headaches.  Medicine conflicts  Right now, there are no known conflicts with other drugs. But tell your care team about all medicines you take.  Cautions  This medicine is not advised for patients who are pregnant.  If you are someone who could become pregnant, use trusted birth  control until 4 days after your last dose of molnupiravir.  If your partner could become pregnant, you should use trusted birth control until 3 months after your last dose of molnupiravir.

## 2024-03-14 ENCOUNTER — MYC MEDICAL ADVICE (OUTPATIENT)
Dept: FAMILY MEDICINE | Facility: CLINIC | Age: 54
End: 2024-03-14
Payer: COMMERCIAL

## 2024-03-14 NOTE — LETTER
2024      Margie K Rosita  [address] [address]      Your team at [facility] cares about your health. We have reviewed your chart and based on our findings; we are making the following recommendations to better manage your health.      You are in particular need of attention regarding the followin) Please call [phone] to schedule a colonoscopy.     2) Schedule Annual MAMMOGRAPHY. [facility] scheduling number is [phone] or schedule in [facility] (self referral).     If you have already completed these items, please contact the clinic via phone or   [facility] so your care team can review and update your records. Thank you for   choosing [facility] for your healthcare needs. For any questions,   concerns, or to schedule an appointment please contact our clinic.     Healthy Regards,        Your [facility] Care Team

## 2024-03-14 NOTE — TELEPHONE ENCOUNTER
Patient Quality Outreach    Patient is due for the following:   Colon Cancer Screening  Breast Cancer Screening - Mammogram    Next Steps:   Patient needs to schedule mammogram and colonoscopy    Type of outreach:    Sent [employer] message.      Questions for provider review:    None           Anisa Manjarrez CMA

## 2024-03-27 DIAGNOSIS — I10 HYPERTENSION GOAL BP (BLOOD PRESSURE) < 140/90: ICD-10-CM

## 2024-03-27 RX ORDER — LOSARTAN POTASSIUM 100 MG/1
100 TABLET ORAL DAILY
Qty: 90 TABLET | Refills: 0 | Status: SHIPPED | OUTPATIENT
Start: 2024-03-27 | End: 2024-06-27

## 2024-05-30 ENCOUNTER — PATIENT OUTREACH (OUTPATIENT)
Dept: CARE COORDINATION | Facility: CLINIC | Age: 54
End: 2024-05-30
Payer: COMMERCIAL

## 2024-06-04 ENCOUNTER — MYC REFILL (OUTPATIENT)
Dept: FAMILY MEDICINE | Facility: CLINIC | Age: 54
End: 2024-06-04
Payer: COMMERCIAL

## 2024-06-04 DIAGNOSIS — F40.01 FEAR OF TRAVEL WITH PANIC ATTACKS: ICD-10-CM

## 2024-06-04 DIAGNOSIS — R11.0 NAUSEA: ICD-10-CM

## 2024-06-04 RX ORDER — ONDANSETRON 4 MG/1
TABLET, ORALLY DISINTEGRATING ORAL
Qty: 18 TABLET | Refills: 1 | Status: SHIPPED | OUTPATIENT
Start: 2024-06-04

## 2024-06-04 RX ORDER — HYDROXYZINE PAMOATE 25 MG/1
25 CAPSULE ORAL 3 TIMES DAILY PRN
Qty: 20 CAPSULE | Refills: 0 | Status: SHIPPED | OUTPATIENT
Start: 2024-06-04

## 2024-06-05 ENCOUNTER — ANCILLARY PROCEDURE (OUTPATIENT)
Dept: MAMMOGRAPHY | Facility: CLINIC | Age: 54
End: 2024-06-05
Attending: FAMILY MEDICINE
Payer: COMMERCIAL

## 2024-06-05 DIAGNOSIS — Z12.31 ENCOUNTER FOR SCREENING MAMMOGRAM FOR BREAST CANCER: ICD-10-CM

## 2024-06-05 PROCEDURE — 77067 SCR MAMMO BI INCL CAD: CPT | Mod: TC | Performed by: RADIOLOGY

## 2024-06-05 PROCEDURE — 77063 BREAST TOMOSYNTHESIS BI: CPT | Mod: TC | Performed by: RADIOLOGY

## 2024-06-13 ENCOUNTER — PATIENT OUTREACH (OUTPATIENT)
Dept: CARE COORDINATION | Facility: CLINIC | Age: 54
End: 2024-06-13
Payer: COMMERCIAL

## 2024-06-27 DIAGNOSIS — I10 HYPERTENSION GOAL BP (BLOOD PRESSURE) < 140/90: ICD-10-CM

## 2024-06-27 RX ORDER — LOSARTAN POTASSIUM 100 MG/1
100 TABLET ORAL DAILY
Qty: 90 TABLET | Refills: 0 | Status: SHIPPED | OUTPATIENT
Start: 2024-06-27 | End: 2024-09-26

## 2024-08-18 ENCOUNTER — HEALTH MAINTENANCE LETTER (OUTPATIENT)
Age: 54
End: 2024-08-18

## 2024-09-26 DIAGNOSIS — I10 HYPERTENSION GOAL BP (BLOOD PRESSURE) < 140/90: ICD-10-CM

## 2024-09-26 RX ORDER — LOSARTAN POTASSIUM 100 MG/1
100 TABLET ORAL DAILY
Qty: 30 TABLET | Refills: 0 | Status: SHIPPED | OUTPATIENT
Start: 2024-09-26

## 2024-12-26 ENCOUNTER — PATIENT OUTREACH (OUTPATIENT)
Dept: CARE COORDINATION | Facility: CLINIC | Age: 54
End: 2024-12-26
Payer: COMMERCIAL

## 2025-04-04 ENCOUNTER — ALLIED HEALTH/NURSE VISIT (OUTPATIENT)
Dept: RESEARCH | Facility: CLINIC | Age: 55
End: 2025-04-04
Payer: COMMERCIAL

## 2025-04-04 DIAGNOSIS — Z00.6 EXAMINATION OF PARTICIPANT OR CONTROL IN CLINICAL RESEARCH: Primary | ICD-10-CM

## 2025-04-04 NOTE — PROGRESS NOTES
NP called patient at home phone number and her  Juan said patient is on a trip and to call her on her cell.  NP called patient cell phone at 16:57 pm  left message of asking permission to view her chart of care everywhere.       NP called on 4/8/2025 at 9 am asked for patient's permission to review her chart in care everywhere to [facility], [place] and [facility] and patient agreed.

## 2025-04-09 ENCOUNTER — ALLIED HEALTH/NURSE VISIT (OUTPATIENT)
Dept: RESEARCH | Facility: CLINIC | Age: 55
End: 2025-04-09
Payer: COMMERCIAL

## 2025-04-09 VITALS
WEIGHT: 176 LBS | SYSTOLIC BLOOD PRESSURE: 144 MMHG | RESPIRATION RATE: 16 BRPM | DIASTOLIC BLOOD PRESSURE: 82 MMHG | HEIGHT: 65 IN | BODY MASS INDEX: 29.32 KG/M2 | OXYGEN SATURATION: 97 % | HEART RATE: 84 BPM

## 2025-04-09 DIAGNOSIS — Z00.6 EXAMINATION OF PARTICIPANT OR CONTROL IN CLINICAL RESEARCH: Primary | ICD-10-CM

## 2025-04-09 PROCEDURE — 99207 PR NO CHARGE-RESEARCH SERVICE: CPT

## 2025-04-09 NOTE — PROGRESS NOTES
[place] Screening Study Note  Study Description: The purpose of this study is to explore potential relationships between physiologic parameters collected from sensor data with physiological changes potentially induced by the administration of the COVID-19 vaccine.       Subject ID:      Demographic Info  Margie Becker   1970          54 year old    SCREENING   Sex: Female   Pregnancy Screening:   Surgically Sterile: No  Over 55 years of age and have not had a menstrual cycle for >2 years: No  Last Menstrual Period Date: 1-April-2024     Multi Racial?: No; Primary: White   Ethnicity: Not  or      MEDICAL HISTORY REVIEW  Medical history, medications, allergies, surgical history and familial medical history were reviewed with the participant and verified by chart review.     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     Medical Conditions:   The table below represents their relevant medical history.   Has the subject experienced any relevant past and/or concomitant Medical History (e.g., chronic conditions such as hypertension, cardiovascular disease, stroke, diabetes, kidney disease, peripheral arterial disease, Raynaud's syndrome? Yes  Past Medical History:   Diagnosis Date    Esophageal reflux ; Ongoing  9/13/2004    Fear of travel with panic attacks - not as pertinent as now she is retired and no longer travelling for work ; Ongoing  04/30/2010    Hypertension ; Ongoing  1/1/2012    Migraines ; Ongoing   Inguinal Hernia - repaired in 1975 5/4/2009 1/1/1975     Add 'Ongoing' or end date to medical condition as applicable    Any History of...  If any of the below are yes, the subject is a screen fail.  -Divertic___ (diverticulosis, diverticula, diverticulitis, etc.)? No  -Rheumatoid arthritis? No  Lupus? No  Hernia? (Other than inguinal or childhood umbilical)  No  Peptic Ulcer? No  Crohn's Disease? No  In any 1st degree  family members? No  Colitis? No  Dysphagia? No  Parkinson s? No  Essential Tremor? No      Concomitant Medications:   The table below represents their current prescription, OTC, and supplement medications they are taking on a regular basis/have taken in the last 30 days.      Medication Name (Generic) Class Start Date (MM/DD/YYY) Ongoing? Dose Unit Frequency Route Indication   Cetirizine  Other  Yes 10  mg QD Oral Con Med Cond: Allergies   Losartan Other SEP- Yes 100 mg QD Oral Con Med Cond: hypertension   Acetaminophen Non-steroidal Anti-inflammatory (NSAID)  Yes 500 mg PRN Oral Other, specify as needed for aches and pains       Allergies:   Does the subject have any known allergies to medications, food, a vaccine component, or latex? Yes  *If the participant has an allergy to something in not one of these 3 categories, include them in the medical history.*  Allergies   Allergen Reactions    Iodine Hives     xray dye    Penicillins Hives    Hydrochlorothiazide Rash    Lexapro [Escitalopram Oxalate] Rash    Sulfa Antibiotics Rash        Surgical History  Any history of gastrointestinal tract surgery involving the esophagus, stomach, or intestines? Yes  If there are no GI surgeries, delete surgery smartlist section.     Past Surgical History:   Procedure Laterality Date    CATARACT EXTRACTION Right 2022    CATARACT IOL, RT/LT       SECTION  2013    DILATION AND CURETTAGE, OPERATIVE HYSTEROSCOPY, COMBINED  2011    HERNIA REPAIR  1975    MYOMECTOMY UTERUS  2010    MYOMECTOMY UTERUS      RHINOPLASTY      SURGICAL HISTORY OF -          Family Medical History  No first degree relative has a history of any inflammatory bowel disease with suspected hereditary transmission (eg. Crohn's, ulcerative colitis, etc)     Family History   Problem Relation Age of Onset    Arthritis Mother     Thyroid Disease Mother         hypothyroidism    Hypertension Mother     Breast  "Cancer Mother     Macular Degeneration Mother     Depression Father     Alcohol/Drug Father     Neurologic Disorder Father         seizures    Lipids Father     Cataracts Father     Diabetes Maternal Grandmother     Heart Disease Maternal Grandfather     Heart Disease Paternal Grandmother     Genitourinary Problems Paternal Grandfather     Depression Sister     Depression Sister        Subject Characteristics     Vitals  BP (!) 144/82 (BP Location: Right arm, Patient Position: Sitting, Cuff Size: Adult Large)   Pulse 84   Resp 16   Ht 1.651 m (5' 5\")   Wt 79.8 kg (176 lb)   SpO2 97%   BMI 29.29 kg/m         Duarte Scale:  Wrist Circumference: Study Watch Wrist Preferred Watch Wrist Dominant Hand Watch Band Tightness:   3 18.1 cm Right Right Right Secure      Watch Size Preference: 41mm    ENROLLMENT    Was the visit performed? Yes   Date of Enrollment: 9-APR-2025. All procedures below occurred on this day.    Site Zip Code: Site Time Zone:  Site Location: Protocol Assigned: Eligibility Confirmed:   [address] [address]  Protocol A (COVID) Yes   Plan for Study Kit #1 Delivery: Given to Participant On-Site     [place] Device Accountability Prepped/Dispensed  Prepped & Dispensed Study Kit #1:  All Study Devices included? Yes (including Study Watch, Study Phone, Ambient Sensor, Oral Thermometer and Charging Accessories)  Is this a Replacement Kit? No    Study Phone  ID HSA Research ID [place] ID    [id number]     Study Watch  ID Model  Band Type    [id number] Series 9 Sport Loop     Was Study Kit #1 Shipped to the Participant? No  Were all expected devices received? Yes  Were there Device Issues? No  Was the Study Kit Replaced? No    All devices listed above were dispensed to the participant. Device ID were confirmed prior to dispensation.      Participant was thoroughly educated on study procedure and device care, staff highlighted the importance of compliance to study procedure. All questions and " concerns were addressed, and informed participant to contact study coordinators for any questions. Subject was provided with a copy of the subject instructions for at home review.     Adverse Events Summary:*   Were any Adverse Events (AEs) experienced? No    Protocol Deviation Summary:*   Were there any Protocol Deviations?: No    *If Yes, please complete corresponding form.    Concomitant Medications:  As screening and enrollment appointments occurred on the same day, please refer to the concomitant medications documented above.        9-APR-2025   Teena Ball

## 2025-04-09 NOTE — PROGRESS NOTES
[place] Inclusion/Exclusion Criteria:    Study Name: [place] (-[id number])      : Dereje Harris MD      Study Description: The purpose of this study is to explore potential relationships between physiologic parameters collected from sensor data with physiological changes potentially induced by the administration of the COVID-19 vaccine.     Protocol Version: 6.0 (Version Date: 2-APR-2025) Consent Version: 6.0 (Version Date: 3-APR-2025)  The protocol and consent form were consulted to make inclusion and exclusion decisions along with the srinivas dated 27-February-2025 that addresses all [place] Phase 1.0 nuances and provides further clarification for some inclusion/exclusion criteria.     Inclusion #  Inclusion Criteria (ALL MUST BE YES)  YES/NO/N/A   1 Be at least 18 years old  Yes   2 Proficient in written and spoken English, defined by self-report   Yes   3 Willing and able to participate in the study procedures and data consent described in the consent form   Yes   4 Able to communicate effectively with and follow instructions from the Study Team    Yes   5   Eligible to receive the updated COVID-19 vaccine based on current CDC recommendations and vaccine prescribing information. (As determined by Sub-I) Yes   6   Able to disclose home address to a healthcare provider or Study Team member to enable (a) device shipping (if necessary) and (b) a 911 call in case of potential emergency (home address will not be kept as study data)  Yes   7    Able to adhere to Lifestyle Considerations (see Section 5.3) throughout study duration (as applicable). These include avoiding taking certain over-the-counter pain relievers or fever reducing medications around the time of vaccination, not taking any recreational drugs (e.g. methamphetamines, cocaine, opioids, cannabis, LSD)  within 72 hours prior to, during and after the ingestible temperature sensor monitoring period; and abstaining from strenuous  "exercise, ingestion of hot or cold liquids, eating food, chewing gum or mints, brushing teeth or smoking 30 minutes before taking oral temperature measurements.  Yes   8   Participant has their own reliable high-speed broadband internet at their home and active at the time of data collection  Yes   9   Have a personal computer, desktop, laptop, tablet, or smartphone with audiovisual capabilities Yes   If any inclusion criteria marked \"No\" please provide detail (If all Yes, N/A): N/A        Exclusion # Exclusion Criteria (ALL MUST BE NO) YES/NO/N/A   1 Participants with tattoos, skin problems or wound(s) on/in the wrist or deltoid (ex: injured or friable skin, skin disorders, or allergic skin reactions, such as eczema, rosacea, impetigo, dermatomyositis, or allergic contact dermatitis), that can interfere with study setup/assessments/vaccination  No   2 Individuals who are pregnant or plan to become pregnant during the study  No   3 Anything that may interfere with proper physiological data acquisition, such as an implantable device (e.g., cardiac pacemaker, automated implantable cardioverter-defibrillator, deep brain stimulator, Inspire upper airway stimulation device) and casts or body braces No   4 Participants that are diagnosed or are suspected to have illnesses affecting motion: e.g., Parkinson's, Essential Tremor, Dystonia, or others at investigator's discretion No   5 Participants that are diagnosed with a condition or taking a medication that impairs the immune system (i.e., active cancer, HIV/AIDS, organ/stem cell transplant recipient, autoimmune disorders, primary immunodeficiencies) No   6 Participants with any medical history, vital sign, or any other study procedure finding/assessment that in the opinion of the investigator could compromise participant safety during study participation or interfere with the study integrity and/or the accurate assessment of the study objectives No   7 Daily use of OTC or " "prescription medications with antipyretic properties at time of enrollment that is anticipated to continue during the CBT sensor data collection period surrounding administration of vaccines. Low dose aspirin (81 mg or less per day) taken for preventative purposes is permissible No   8 Individuals who are unwilling to avoid taking OTC pain relievers and anti-pyrectics for acute mild to moderate pain and fever associated with vaccine administration during the data collection days surrounding its administration No   9 Participant works for a company that develops or sells medical and/or fitness devices (e.g., ECG monitors, wearable fitness bands, sleep monitors, etc.) or are technology journalists (e.g., professional bloggers, TV, magazine, newspaper reporters, etc.) No   10 Overnight travel or travel between time zones planned during CBT sensor data collection nights No   11 Participants with planned overnight travel totaling >= 7 nights during duration of study data collection period No   12 Participant plans on moving or changing address within the study period No   13 Participant is employed in overnight shift work, or otherwise does not maintain a reasonably consistent day/night schedule (e.g., participants who are unable to regularly go to bed between 7pm to 2am and wake up between 4am to 12pm on average >= 3 times a week) No   14 Participants with clinically relevant sleep disturbances and unable to achieve at least 4 hours of continuous sleep on average each night No   If any exclusion criteria marked \"Yes\" please provide detail (If all No, N/A): N/A    Exclusion (a) # Exclusion criteria related to the COVID-19 vaccine:   (ALL MUST BE NO) YES/NO/N/A   1 Participants with a known history of a severe allergic reaction (e.g., anaphylaxis) to any component of the COVID-19 vaccine. No   2 Participants who experienced severe side effects following previous administration of the COVID-19 vaccine including " "myocarditis, pericarditis, thrombosis, or thrombocytopenia No   3 Participants in whom an additional COVID-19 vaccine is contraindicated. No   If any exclusion criteria marked \"Yes\" please provide detail (If all No, N/A): N/A    Exclusion (b) # Exclusion criteria related to Ingestible Temperature Sensor:   (ALL MUST BE NO) YES/NO/N/A   1 Participants under the age of 18 No   2 Participant weighs less than 40 kg (88 lbs.) or BMI greater than 44.6 No   3 Participants who are pregnant No   4 Participants with a known diagnosis of obstructive disease of the gastrointestinal tract or a known hernia, Crohn's disease, diverticulitis, or other inflammatory bowel disease. No   5 Participants with a 1st degree relative with any inflammatory bowel disease with suspected hereditary transmission (e.g., Crohn's disease, or ulcerative colitis) No   6 Participants with known history of disordered or impaired gag reflex  No   7 Participants who have problems swallowing food or pills (e.g., dysphagia) No   8 Participants with previous gastrointestinal tract surgery involving the esophagus, stomach, or intestines, excluding intraluminal endoscopy. No   9 Participants with known diagnosis of hypo-motility disorders of the gastrointestinal tract (including chronic constipation with fewer than three spontaneous bowel movements per week No   10 Participants with chronic diarrhea, as defined by 3 or more episodes of diarrhea per week for the last 30 days or >= 3 bowel movements per day No   11 Participants with known diagnosis of felinization of the esophagus (unusual folding of the esophagus)  No   12 Participants with Zenker's diverticulum (a pouch that forms in the upper part of the esophagus) and people with a history of other diverticula.  No   13 Participants who may undergo NMR or MRI scanning within one week of CBT sensor ingestion No   14 Participants with an implantable pulse generator or implantable electro-medical device of any " "kind (e.g., pacemakers (or implantable pulse generators), implantable cardioverter defibrillators (ICDs), deep brain stimulation (DBS) devices, and left ventricular assist devices (LVADs). No   15 Participants with an implanted or temporarily implanted device that uses an external power-source. No   If any exclusion criteria marked \"Yes\" please provide detail (If all No, N/A): N/A    Will the participant continue in the study? Yes  (If \"No\", follow instructions for handling of Screen Failures)    If the participant is eligible to continue in the study, inclusion/exclusion criteria above will be sent to the PI for co-sign.    Enrollment Date:  9-APR-2025      MD Teena Faith    "

## 2025-04-09 NOTE — PROGRESS NOTES
[place] Study Consent    Study Description: The purpose of this study is to explore potential relationships between physiologic parameters collected from sensor data with physiological changes potentially induced by the administration of the COVID-19 vaccine.    Margie Becker a 54 year old female, was on-site today at [facility] to discuss participation for [place] (-[id number])       The consent form was reviewed with the patient.     The review of the study included:  Study Purpose      Participant Duration, Responsibilities & Expectations    Study Data and Devices    Benefits and Risks of Participation    Compensation and Costs of Participation    Coded Study Data  Voluntary Participation    Study Restrictions  Confidentiality Obligations/Privacy-Related Risks   Injury, Legal, and Data Rights    Authorization to Use and Disclose Your Protected Health Information    Protocol Version: 6.0   Principle Investigator: Dereje Harris MD    Subject Number: [id number]      The subject was queried in regards to her willingness to continue and her questions were answered to her satisfaction. The patient has given her agreement to volunteer and participate in the above noted study.     The eConsent and HIPAA form version (Version 6.0 Date 03-Apr-2025) was signed on  9-APR-2025 with the Clinical Research Unit of [facility].     A copy of the [place] consent will be placed in subject's medical record. A copy of the consent form was provided to the subject today.    Study data is directly entered into Epic and [employer] per protocol. No study procedures were done prior to Margie Becker providing informed consent.       9-APR-2025    Teena Ball

## 2025-04-09 NOTE — PROGRESS NOTES
[place] Study Physical Exam  Study Description: The purpose of this study is to explore potential relationships between physiologic parameters collected from sensor data with physiological changes potentially induced by the administration of the COVID-19 vaccine.     Medical History Reviewed? Yes  After extensive review of the entire available medical record, to the best of my knowledge there is no reason to exclude this patient from the study.     Medical Decision Making (include details from chart review, discussion with Dr. DIAZ, etc): N/A    Physical Examination  For abnormal findings, please evaluate if the finding is Clinically Significant (by 'CS') or Not Clinically Significant (by 'NCS')  General Appearance   Normal  Head and Neck   Normal  Lungs     Normal  Cardiovascular   Normal   Do they have a stimulator/Pacemaker? No  Gastrointestinal   Normal   Problems swallowing medication? No  ANY history of diverticula (diverticulosis, diverticulitis, etc): No  Any history of GI surgery? No  Bowel habits: daily    Regular laxative use? No  Musculoskeletal/Extremities Normal   Lymph Nodes    Normal  Skin     Normal     Any Tattoos or Skin issues on the wrists or deltoid? No  Neurological    Normal   Any sleep disturbances? (Must get at least 5 hours a night) No Patient sleeps 8 hours a night    Memory issues?  No    Tremor (If present document)  Absent  Any balance issues or recent falls?     No    There were no vitals filed for this visit.          Immunization History   Administered Date(s) Administered    COVID-19 Bivalent 18+ (Moderna) 10/20/2022    COVID-19 MONOVALENT 12+ (Pfizer) 04/07/2021, 04/28/2021    COVID-19 Monovalent Booster 18+ (Moderna) 12/13/2021    HepA-Peds, Unspecified 03/12/1998    Hepatitis A (VAQTA)(ADULT 19+) 03/12/1998    Influenza (IIV3) PF 11/13/2012    Influenza Vaccine 18-64 (Flublok) 09/13/2022    Influenza Vaccine >6 months,quad, PF 10/26/2015, 09/29/2017, 01/11/2019, 09/03/2020,  10/08/2021    Influenza Vaccine, 6+MO IM (QUADRIVALENT W/PRESERVATIVES) 11/18/2019, 09/03/2020    TD,PF 7+ (Tenivac) 04/22/2003    TDAP Vaccine (Adacel) 06/25/2018    Zoster recombinant adjuvanted (Shingrix) 06/29/2023   Reminders:  Are they using prescription pain meds? No  Any first degree relatives with inflammatory bowel disease? (Crohn's, ulcerative colitis, etc) No  Any serious medical issues that require treatment and evaluation? No  Any conditions they are following closely with their PCP? No  Have you had any serious issues with previous Covid-19 immunizations? No  COVID Vaccine Screening   Have you received a dose of the Covid-19 vaccine before?   Yes, Moderna  Date of most recent Covid-19 vaccine dose:     20-October-2022   Do you currently have a health condition or are undergoing    treatment that makes you moderately to severely immunocompromised?* No  Have you ever had an allergic reaction to a Covid vaccine?**  No  Have you ever had an allergic reaction to another vaccine or injectable  medication?         No  Have you ever felt dizzy or faint before, during or after a shot?   No    *Ex: treatment of cancer, HIV, organ transplant recipient, immunosuppressive therapy, etc.     **This would include a severe allergic reaction (e.g., anaphylaxis that required treatment with epinephrine or caused you to go to the hospital. It would also include an allergic reaction that caused hives, swelling, or respiratory distress, including wheezing)    Is this subject eligible to receive a Covid-19 vaccine? Yes    Patient does fulfill study inclusion criteria and no exclusion criteria are found. Subject will continue in the study. This decision was made at 9:57    09-APR-2025    Miryam Agarwal NP

## 2025-04-16 ENCOUNTER — MYC REFILL (OUTPATIENT)
Dept: FAMILY MEDICINE | Facility: CLINIC | Age: 55
End: 2025-04-16
Payer: COMMERCIAL

## 2025-04-16 DIAGNOSIS — I10 HYPERTENSION GOAL BP (BLOOD PRESSURE) < 140/90: ICD-10-CM

## 2025-04-17 RX ORDER — LOSARTAN POTASSIUM 100 MG/1
100 TABLET ORAL DAILY
Qty: 30 TABLET | Refills: 0 | Status: SHIPPED | OUTPATIENT
Start: 2025-04-17

## 2025-04-29 ENCOUNTER — ALLIED HEALTH/NURSE VISIT (OUTPATIENT)
Dept: RESEARCH | Facility: CLINIC | Age: 55
End: 2025-04-29
Payer: COMMERCIAL

## 2025-04-29 VITALS — WEIGHT: 176 LBS | HEIGHT: 65 IN | BODY MASS INDEX: 29.32 KG/M2

## 2025-04-29 DIAGNOSIS — Z00.6 EXAMINATION OF PARTICIPANT OR CONTROL IN CLINICAL RESEARCH: Primary | ICD-10-CM

## 2025-04-29 PROCEDURE — 99207 PR NO CHARGE-RESEARCH SERVICE: CPT

## 2025-04-29 NOTE — PROGRESS NOTES
[place] Pill Witness Study Note  Study Description: The purpose of this study is to explore potential relationships between physiologic parameters collected from sensor data with physiological changes potentially induced by the administration of the COVID-19 vaccine.       I supervised while Margie Becker ingested the smart sensor. Smart sensor was swallowed without complication. Participant felt well after and was able to proceed with study procedures.     29-APR-2025     Jackie Singletary PA-C

## 2025-04-29 NOTE — PROGRESS NOTES
"               [place] Pill Day / On-Site 1 Note  Study Description: The purpose of this study is to explore potential relationships between physiologic parameters collected from sensor data with physiological changes potentially induced by the administration of the COVID-19 vaccine.    Subject ID: [id number]     Margie Becker presents to [facility] for On-Site 1 on 29-APR-2025. All procedures below occurred on this date.     On-Site Visit 1                                                                Was the visit performed? Yes  Height/Weight and BMI confirmed? Yes  Repeat Height/Weight and BMI  Ht 1.651 m (5' 5\")   Wt 79.8 kg (176 lb)   BMI 29.29 kg/m     Note: Capture of the values of Height/Weight and BMI confirmation have no bearing on recruitment binning or usable data binning. The participant will remain in their bin as assigned at screening/enrollment. Sponsor requested this information to be captured but not input in to [facility].      Pregnancy Test Needed? Yes, Urine Pregnancy Test Form:   Was the pregnancy test performed? Yes  Result: Negative  Action Taken: Proceed with Study   NSAID Usage: Has the participant taken an OTC pain relief or fever reducing medication or NSAID in the last 30 days? No   The participant was reminded to refrain from NSAID usage until a fever of 102.2 is reached and/or as recommended by a medical professional.     verbalized their understanding. Given this discussion today, their medication list has been updated to reflect today as the stop date of their NSAID.       CBT Device Kit Accountability   Was the [place] Dispensed? Yes  [place] ID: [id number]     Pill 1 Pill 2   Serial Number [id number]   Lot Number [id number] [id number]   Pill Activation Time 10:03 10:03   Were the above pills activated and dispensed? Yes  Only 2 pills were activated and dispensed at this visit given the prevailing 3 pill plan as outlined in section 6.2 (pg. 73) of the MOP.     Pills were activated by " the Device Manager and verified by Myself.       CBT Pill Ingestion Log     Pill 1:  Was Pill 1 ingested? Yes  Time of Ingestion: 13:07     Ingestion of Pill 1 was witnessed by Jackie Singletary PA-C. Please see their documentation for further details.     Adverse Events Summary:*   Were any Adverse Events (AEs) experienced?  No    Protocol Deviation Summary:*   Were there any Protocol Deviations?:  No    *If Yes, please complete corresponding form.    Concomitant Medications Summary:    Has the subject taken any medications within 30 days prior to signing the informed consent and/or during the study? (Have they started any new medications since their last visit?) Yes, and there have been changes to their medication. Please see updates medications below   Medication Name (Generic) Class Start Date Ongoing? Dose Unit Frequency Route Indication   Acetaminophen  Non-steroidal Anti-inflammatory (NSAID) 8-APR-2025 No, Stop Date: 29-APR-2025 500  mg PRN Oral Other, specify as needed for aches and pain          29-APR-2025  Teena Ball

## 2025-04-30 ENCOUNTER — VIRTUAL VISIT (OUTPATIENT)
Dept: RESEARCH | Facility: CLINIC | Age: 55
End: 2025-04-30
Payer: COMMERCIAL

## 2025-04-30 DIAGNOSIS — Z00.6 RESEARCH SUBJECT: Primary | ICD-10-CM

## 2025-04-30 PROCEDURE — 99207 PR NO CHARGE NURSE ONLY: CPT | Mod: 93

## 2025-04-30 SDOH — HEALTH STABILITY: PHYSICAL HEALTH: ON AVERAGE, HOW MANY MINUTES DO YOU ENGAGE IN EXERCISE AT THIS LEVEL?: 20 MIN

## 2025-04-30 SDOH — HEALTH STABILITY: PHYSICAL HEALTH: ON AVERAGE, HOW MANY DAYS PER WEEK DO YOU ENGAGE IN MODERATE TO STRENUOUS EXERCISE (LIKE A BRISK WALK)?: 2 DAYS

## 2025-04-30 ASSESSMENT — SOCIAL DETERMINANTS OF HEALTH (SDOH): HOW OFTEN DO YOU GET TOGETHER WITH FRIENDS OR RELATIVES?: THREE TIMES A WEEK

## 2025-04-30 ASSESSMENT — PATIENT HEALTH QUESTIONNAIRE - PHQ9
10. IF YOU CHECKED OFF ANY PROBLEMS, HOW DIFFICULT HAVE THESE PROBLEMS MADE IT FOR YOU TO DO YOUR WORK, TAKE CARE OF THINGS AT HOME, OR GET ALONG WITH OTHER PEOPLE: SOMEWHAT DIFFICULT
SUM OF ALL RESPONSES TO PHQ QUESTIONS 1-9: 2
SUM OF ALL RESPONSES TO PHQ QUESTIONS 1-9: 2

## 2025-04-30 NOTE — PROGRESS NOTES
[place] Pre-Vaccine Call / Virtual Visit 2 Study Note    Study Description: The purpose of this study is to explore potential relationships between physiologic parameters collected from sensor data with physiological changes potentially induced by the administration of the COVID-19 vaccine.       Subject ID:      Was the visit performed? Yes  Location:     Virtual Visit 2: 30-APR-2025  Time of Visit (24H): 10:10    Date/Time of Onsite Visit 2 / Vaccine Appointment Confirmed? Yes    Reminders  [] Check compliance and address any issues   -If there are pending [place] uploads, bring gateway close to the phone  [x] Are you having any issues with your study devices? No  [x] Continue to follow nightly/daily study procedures   [x] Are you sick today or experiencing any cold/flu-like symptoms today? No     -If yes, investigate if rescheduling vaccine appointment is required  [x] Please remember to ingest Pill #3 tonight/the evening before your vaccine appointment- tomorrow   [x] Please bring your Study Watch, Study Phone, Subject Instructions AND GATEWAY to your appointment tomorrow.       Additional Notes: N/A    Adverse Events Summary:*   Were any Adverse Events (AEs) experienced?  No    Protocol Deviation Summary:*   Were there any Protocol Deviations?:  No    *If Yes, please complete corresponding form.    Concomitant Medications Summary:    Has the subject taken any medications within 30 days prior to signing the informed consent and/or during the study? (Have they started any new medications since their last appointment?)   Yes, and their medications have not changed since their last visit. Please refer to that documentation for the medication list.       30-APR-2025     María Elena Patten RN

## 2025-05-01 ENCOUNTER — OFFICE VISIT (OUTPATIENT)
Dept: FAMILY MEDICINE | Facility: CLINIC | Age: 55
End: 2025-05-01
Payer: COMMERCIAL

## 2025-05-01 VITALS
DIASTOLIC BLOOD PRESSURE: 62 MMHG | OXYGEN SATURATION: 97 % | HEIGHT: 65 IN | SYSTOLIC BLOOD PRESSURE: 114 MMHG | WEIGHT: 204.2 LBS | HEART RATE: 88 BPM | RESPIRATION RATE: 20 BRPM | TEMPERATURE: 98.7 F | BODY MASS INDEX: 34.02 KG/M2

## 2025-05-01 DIAGNOSIS — M25.511 RIGHT SHOULDER PAIN, UNSPECIFIED CHRONICITY: ICD-10-CM

## 2025-05-01 DIAGNOSIS — Z00.00 ROUTINE GENERAL MEDICAL EXAMINATION AT A HEALTH CARE FACILITY: Primary | ICD-10-CM

## 2025-05-01 DIAGNOSIS — Z13.6 SCREENING FOR CARDIOVASCULAR CONDITION: ICD-10-CM

## 2025-05-01 DIAGNOSIS — I10 HYPERTENSION GOAL BP (BLOOD PRESSURE) < 140/90: ICD-10-CM

## 2025-05-01 DIAGNOSIS — Z12.11 SCREEN FOR COLON CANCER: ICD-10-CM

## 2025-05-01 LAB
ALBUMIN SERPL BCG-MCNC: 4.3 G/DL (ref 3.5–5.2)
ALP SERPL-CCNC: 89 U/L (ref 40–150)
ALT SERPL W P-5'-P-CCNC: 16 U/L (ref 0–50)
ANION GAP SERPL CALCULATED.3IONS-SCNC: 12 MMOL/L (ref 7–15)
AST SERPL W P-5'-P-CCNC: 21 U/L (ref 0–45)
BILIRUB SERPL-MCNC: 0.5 MG/DL
BUN SERPL-MCNC: 11.5 MG/DL (ref 6–20)
CALCIUM SERPL-MCNC: 9.7 MG/DL (ref 8.8–10.4)
CHLORIDE SERPL-SCNC: 103 MMOL/L (ref 98–107)
CHOLEST SERPL-MCNC: 219 MG/DL
CREAT SERPL-MCNC: 0.92 MG/DL (ref 0.51–0.95)
CRP SERPL-MCNC: 8.95 MG/L
EGFRCR SERPLBLD CKD-EPI 2021: 74 ML/MIN/1.73M2
ERYTHROCYTE [DISTWIDTH] IN BLOOD BY AUTOMATED COUNT: 13.1 % (ref 10–15)
ERYTHROCYTE [SEDIMENTATION RATE] IN BLOOD BY WESTERGREN METHOD: 16 MM/HR (ref 0–30)
FASTING STATUS PATIENT QL REPORTED: YES
FASTING STATUS PATIENT QL REPORTED: YES
GLUCOSE SERPL-MCNC: 88 MG/DL (ref 70–99)
HCO3 SERPL-SCNC: 24 MMOL/L (ref 22–29)
HCT VFR BLD AUTO: 42.6 % (ref 35–47)
HDLC SERPL-MCNC: 40 MG/DL
HGB BLD-MCNC: 14.2 G/DL (ref 11.7–15.7)
LDLC SERPL CALC-MCNC: 133 MG/DL
MCH RBC QN AUTO: 29.8 PG (ref 26.5–33)
MCHC RBC AUTO-ENTMCNC: 33.3 G/DL (ref 31.5–36.5)
MCV RBC AUTO: 89 FL (ref 78–100)
NONHDLC SERPL-MCNC: 179 MG/DL
PLATELET # BLD AUTO: 240 10E3/UL (ref 150–450)
POTASSIUM SERPL-SCNC: 4.3 MMOL/L (ref 3.4–5.3)
PROT SERPL-MCNC: 7.7 G/DL (ref 6.4–8.3)
RBC # BLD AUTO: 4.77 10E6/UL (ref 3.8–5.2)
RHEUMATOID FACT SERPL-ACNC: <10 IU/ML
SODIUM SERPL-SCNC: 139 MMOL/L (ref 135–145)
TRIGL SERPL-MCNC: 230 MG/DL
URATE SERPL-MCNC: 5.3 MG/DL (ref 2.4–5.7)
WBC # BLD AUTO: 6.4 10E3/UL (ref 4–11)

## 2025-05-01 RX ORDER — LOSARTAN POTASSIUM 100 MG/1
100 TABLET ORAL DAILY
Qty: 90 TABLET | Refills: 3 | Status: SHIPPED | OUTPATIENT
Start: 2025-05-01

## 2025-05-01 ASSESSMENT — PAIN SCALES - GENERAL: PAINLEVEL_OUTOF10: SEVERE PAIN (7)

## 2025-05-01 NOTE — PATIENT INSTRUCTIONS
Patient Education   Preventive Care Advice   This is general advice given by our system to help you stay healthy. However, your care team may have specific advice just for you. Please talk to your care team about your preventive care needs.  Nutrition  Eat 5 or more servings of fruits and vegetables each day.  Try wheat bread, brown rice and whole grain pasta (instead of white bread, rice, and pasta).  Get enough calcium and vitamin D. Check the label on foods and aim for 100% of the RDA (recommended daily allowance).  Lifestyle  Exercise at least 150 minutes each week  (30 minutes a day, 5 days a week).  Do muscle strengthening activities 2 days a week. These help control your weight and prevent disease.  No smoking.  Wear sunscreen to prevent skin cancer.  Have a dental exam and cleaning every 6 months.  Yearly exams  See your health care team every year to talk about:  Any changes in your health.  Any medicines your care team has prescribed.  Preventive care, family planning, and ways to prevent chronic diseases.  Shots (vaccines)   HPV shots (up to age 26), if you've never had them before.  Hepatitis B shots (up to age 59), if you've never had them before.  COVID-19 shot: Get this shot when it's due.  Flu shot: Get a flu shot every year.  Tetanus shot: Get a tetanus shot every 10 years.  Pneumococcal, hepatitis A, and RSV shots: Ask your care team if you need these based on your risk.  Shingles shot (for age 50 and up)  General health tests  Diabetes screening:  Starting at age 35, Get screened for diabetes at least every 3 years.  If you are younger than age 35, ask your care team if you should be screened for diabetes.  Cholesterol test: At age 39, start having a cholesterol test every 5 years, or more often if advised.  Bone density scan (DEXA): At age 50, ask your care team if you should have this scan for osteoporosis (brittle bones).  Hepatitis C: Get tested at least once in your life.  STIs (sexually  transmitted infections)  Before age 24: Ask your care team if you should be screened for STIs.  After age 24: Get screened for STIs if you're at risk. You are at risk for STIs (including HIV) if:  You are sexually active with more than one person.  You don't use condoms every time.  You or a partner was diagnosed with a sexually transmitted infection.  If you are at risk for HIV, ask about PrEP medicine to prevent HIV.  Get tested for HIV at least once in your life, whether you are at risk for HIV or not.  Cancer screening tests  Cervical cancer screening: If you have a cervix, begin getting regular cervical cancer screening tests starting at age 21.  Breast cancer scan (mammogram): If you've ever had breasts, begin having regular mammograms starting at age 40. This is a scan to check for breast cancer.  Colon cancer screening: It is important to start screening for colon cancer at age 45.  Have a colonoscopy test every 10 years (or more often if you're at risk) Or, ask your provider about stool tests like a FIT test every year or Cologuard test every 3 years.  To learn more about your testing options, visit:   .  For help making a decision, visit:   https://bit.ly/qb83583.  Prostate cancer screening test: If you have a prostate, ask your care team if a prostate cancer screening test (PSA) at age 55 is right for you.  Lung cancer screening: If you are a current or former smoker ages 50 to 80, ask your care team if ongoing lung cancer screenings are right for you.  For informational purposes only. Not to replace the advice of your health care provider. Copyright   2023 [employer]. All rights reserved. Clinically reviewed by the [facility] Transitions Program. [employer] [id number] - REV 01/24.

## 2025-05-01 NOTE — PROGRESS NOTES
Preventive Care Visit  [facility]  RICHARD Peralta CNP, Family Medicine  May 1, 2025  {Provider  Link to SmartSet :226372}    {PROVIDER CHARTING PREFERENCE:689456}    Joss Hanson is a 54 year old, presenting for the following:  Physical (Last ate 9am )        5/1/2025    12:17 PM   Additional Questions   Roomed by Bety GALLEGOS        HPI       Stiff in the hands, knees, hips  Mother had RA, she worries about this  R shsulder pain, started suddenly, R abuction    Tylenol helps with stiffness of joint.    {MA/LPN/RN Pre-Provider Visit Orders- hCG/UA/Strep (Optional):482897}  Pain History:  When did you first notice your pain? 2 months ago    Have you seen this provider for your pain in the past? No   Where in your body do your have pain? Right shoulder   Are you seeing anyone else for your pain? No  What makes your pain better? Rest, tylenol and ibuprofen   What makes your pain worse? Use of arm   How has pain affected your ability to work? Pain does not limit ability to work   What type of work do you or did you do? Currently unemployed   Who lives in your household?  and  2 daughters         9/13/2022     2:21 PM 6/29/2023     2:02 PM 4/30/2025     6:22 PM   PHQ-9 SCORE   PHQ-9 Total Score MyChart 1 (Minimal depression) 1 (Minimal depression) 2 (Minimal depression)   PHQ-9 Total Score 1 1 2        Patient-reported       {Rooming staff  Please complete GAD7 assessment :026363}  {Rooming staff  Please complete the PEG Assessment  Assess Pain, Function, and Quality of Life. Complete every pain visit :007284}    {After completing assessments, pull in flowsheet scores (Optional) :909444}    Chronic Pain - Initial Assessment:    How would you describe your pain? ***  Have you had any recent changes to the severity or character of your pain? ***  Is there an underlying cause that has been identified? ***  Has your ability to work or do daily activities changed recently  because of your pain? ***  Which of these pain treatments have you tried? { :39066}  Previous medication treatments:  { :899223}  {If newly prescribing or considering opioid therapy, the Opioid Risk Tool must be completed :156809}  {Pull in ORT results (Optional):281334}  {RIOSORD needs to be completed annually :904902}  {Pull in RIOSORD results (Optional):075300}    {Provider  Link to Assessments  Link to Pain Management SmartSet  Includes non-opioid pharmacological medications and referrals  :138487}  {additonal problems for provider to add (Optional):150797}  Advance Care Planning  {The storyboard will display whether the patient has ACP docs on file. Hover over the Code section in the storyboard to access the ACP documents. :987623}  {(AWV REQUIRED) Advance Care Planning Reviewed:751205}        4/30/2025   General Health   How would you rate your overall physical health? Good   Feel stress (tense, anxious, or unable to sleep) Not at all         4/30/2025   Nutrition   Three or more servings of calcium each day? Yes   Diet: Low salt   How many servings of fruit and vegetables per day? (!) 2-3   How many sweetened beverages each day? 0-1         4/30/2025   Exercise   Days per week of moderate/strenous exercise 2 days   Average minutes spent exercising at this level 20 min   (!) EXERCISE CONCERN      4/30/2025   Social Factors   Frequency of gathering with friends or relatives Three times a week   Worry food won't last until get money to buy more No   Food not last or not have enough money for food? No   Do you have housing? (Housing is defined as stable permanent housing and does not include staying outside in a car, in a tent, in an abandoned building, in an overnight shelter, or couch-surfing.) Yes   Are you worried about losing your housing? No   Lack of transportation? No   Unable to get utilities (heat,electricity)? No         4/30/2025   Fall Risk   Fallen 2 or more times in the past year? No   Trouble  with walking or balance? No          4/30/2025   Dental   Dentist two times every year? (!) DECLINE       Today's PHQ-9 Score:       4/30/2025     6:22 PM   PHQ-9 SCORE   PHQ-9 Total Score MyChart 2 (Minimal depression)   PHQ-9 Total Score 2        Patient-reported         4/30/2025   Substance Use   Alcohol more than 3/day or more than 7/wk No   Do you use any other substances recreationally? No     Social History     Tobacco Use    Smoking status: Never     Passive exposure: Never    Smokeless tobacco: Never   Vaping Use    Vaping status: Never Used   Substance Use Topics    Alcohol use: Yes     Comment: 1-2 on weekends    Drug use: No     {Provider  If there are gaps in the social history shown above, please follow the link to update and then refresh the note Link to Social and Substance History :778337}      6/5/2024   LAST FHS-7 RESULTS   1st degree relative breast or ovarian cancer Yes   Any relative bilateral breast cancer No   Any male have breast cancer No   Any ONE woman have BOTH breast AND ovarian cancer No   Any woman with breast cancer before 50yrs No   2 or more relatives with breast AND/OR ovarian cancer No   2 or more relatives with breast AND/OR bowel cancer No     {If any of the questions to the FHS7 are answered yes, consider referral for genetic counseling.    Additional indications for genetic referral include personal history of breast or ovarian cancer, genetic mutation in 1st degree relative which increases risk of breast cancer including BRCA1, BRCA2, FRAN, PALB 2, TP53, CHEK2, PTEN, CDH1, STK11 (per ACS) and/or 1st degree relative with history of pancreatic or high-risk prostate cancer (per NCCN):158032}   {Mammogram Decision Support (Optional):020062}        4/30/2025   STI Screening   New sexual partner(s) since last STI/HIV test? No     History of abnormal Pap smear: { :770279}        Latest Ref Rng & Units 6/29/2023     3:08 PM 10/20/2017     1:37 PM 10/20/2017     1:00 PM   PAP / HPV  "  PAP  Negative for Intraepithelial Lesion or Malignancy (NILM)      PAP (Historical)   NIL     HPV 16 DNA Negative Negative   Negative    HPV 18 DNA Negative Negative   Negative    Other HR HPV Negative Negative   Negative      ASCVD Risk   The ASCVD Risk score (Melvin JEAN BAPTISTE, et al., 2019) failed to calculate for the following reasons:    Cannot find a previous HDL lab    Cannot find a previous total cholesterol lab    {Link to Fracture Risk Assessment Tool (Optional):382566}    {Provider  REQUIRED FOR AWV Use the storyboard to review patient history, after sections have been marked as reviewed, refresh note to capture documentation:696815}   Reviewed and updated as needed this visit by Provider       Med Hx  Surg Hx  Fam Hx            {HISTORY OPTIONS (Optional):193350}    {ROS Picklists (Optional):353856}     Objective    Exam  /62 (BP Location: Right arm, Patient Position: Sitting, Cuff Size: Adult Large)   Pulse 88   Temp 98.7  F (37.1  C) (Oral)   Resp 20   Ht 1.651 m (5' 5\")   Wt 92.6 kg (204 lb 3.2 oz)   SpO2 97%   BMI 33.98 kg/m     Estimated body mass index is 33.98 kg/m  as calculated from the following:    Height as of this encounter: 1.651 m (5' 5\").    Weight as of this encounter: 92.6 kg (204 lb 3.2 oz).    Physical Exam  {Exam Choices (Optional):987551}        Signed Electronically by: RICHARD Peralta CNP  {Email feedback regarding this note to [email or web address]   :641857}  Answers submitted by the patient for this visit:  Patient Health Questionnaire (Submitted on 4/30/2025)  If you checked off any problems, how difficult have these problems made it for you to do your work, take care of things at home, or get along with other people?: Somewhat difficult  PHQ9 TOTAL SCORE: 2    "

## 2025-05-02 PROBLEM — E78.2 MIXED HYPERLIPIDEMIA: Status: ACTIVE | Noted: 2025-05-02

## 2025-05-02 PROBLEM — F33.1 MODERATE EPISODE OF RECURRENT MAJOR DEPRESSIVE DISORDER (H): Status: RESOLVED | Noted: 2021-06-08 | Resolved: 2025-05-02

## 2025-05-02 PROBLEM — F33.0 MILD RECURRENT MAJOR DEPRESSION: Status: ACTIVE | Noted: 2025-05-02

## 2025-05-05 ENCOUNTER — VIRTUAL VISIT (OUTPATIENT)
Dept: RESEARCH | Facility: CLINIC | Age: 55
End: 2025-05-05
Payer: COMMERCIAL

## 2025-05-05 DIAGNOSIS — Z00.6 RESEARCH SUBJECT: Primary | ICD-10-CM

## 2025-05-05 LAB
HBV SURFACE AB SERPL IA-ACNC: <3.5 M[IU]/ML
HBV SURFACE AB SERPL IA-ACNC: NONREACTIVE M[IU]/ML

## 2025-05-05 PROCEDURE — 99207 PR NO CHARGE NURSE ONLY: CPT | Mod: 93

## 2025-05-05 NOTE — PROGRESS NOTES
PHYSICAL THERAPY EVALUATION  Type of Visit: Evaluation       Fall Risk Screen:  Have you fallen 2 or more times in the past year?: No  Have you fallen and had an injury in the past year?: No    Subjective         Presenting condition or subjective complaint:    Date of onset: 05/01/25 (referral date)    Relevant medical history:     Dates & types of surgery:      Prior diagnostic imaging/testing results:       Prior therapy history for the same diagnosis, illness or injury: No      Patient presents with shoulder pain x3 months. States that it started bothering her without specific MOIE early-mid February. Bought herself a sling and limited mobility for a few weeks. Felt like shoulder was improving with use of sling due to decreased pain. Played pickleball in late March after using the sling and started having pain again. Will wake up if she lays on her right side. She has been having global joint stiffness in the mornings but does not think the two are related as the stiffness started later.     Aggs: opening car door while in the passenger seat, reaching to the side, dressing, seat belt, making left hand turns while driving  Eases: Tylenol, staying moving,     Occupation: Not working  Exercise Program: none    Goals: improve shoulder ROM    Living Environment  Social support: With a significant other or spouse   Type of home: House   Stairs to enter the home: Yes 5 Is there a railing: Yes     Ramp: No   Stairs inside the home: Yes 2 Is there a railing: Yes     Help at home: None  Equipment owned:       Employment: No    Hobbies/Interests: gardening  barrios reading painting bike    Patient goals for therapy: raise my right arm up and down with little or no pain       Objective   Posture  Sits and stands with forward shoulders    ROM  Shoulder ROM    Left A/PROM Right A/PROM   Flexion /170 deg, pain end range   Abduction WFL Pain from 70deg-120 deg; able to achieve 140 deg/90 deg, high muscle guarding and p!!    Internal Rotation (0 deg) T10 T10   External Rotation (0 deg) 85 60   Internal Rotation (90 deg) NT ~40 deg   External Rotation (90 deg) NT ~50 deg, p!!     Cervical ROM: WFL    Strength   Left  Right    Flexion 5/5 4/5   Abduction 5/5 4/5, p!   Internal Rotation (0 deg) 5/5 4/5   External Rotation (0 deg) 5/5 4/5   Internal Rotation (90 deg) NT 4/5   External Rotation (90 deg) NT 4/5     Shoulder Special Tests  Neers: (-)  Elias-Austin: (-)  IR Belly Press: (-)  Drop Arm: (-)  IR Lift off: (-)    Palpation:   Tender to palpation at pec complex, biceps insertion and subscap    Joint Mobility:  GHJ AP: Hypomobile, painful  GHJ Inferior: hypomobile, painful  Scapular Distraction: hypomobile  Scapular mobility: grossly hypomobile    Assessment & Plan   CLINICAL IMPRESSIONS  Medical Diagnosis:      Treatment Diagnosis:     Impression/Assessment: Patient is a 54 year old female with shoulder complaints.  The following significant findings have been identified: Pain, Decreased ROM/flexibility, Decreased joint mobility, Decreased strength, Impaired muscle performance, and Decreased activity tolerance. These impairments interfere with their ability to perform self care tasks, recreational activities, and household chores as compared to previous level of function.     Clinical Decision Making (Complexity):  Clinical Presentation: Stable/Uncomplicated  Clinical Presentation Rationale: based on medical and personal factors listed in PT evaluation  Clinical Decision Making (Complexity): Low complexity    PLAN OF CARE  Treatment Interventions:  Interventions: Manual Therapy, Neuromuscular Re-education, Therapeutic Activity, Therapeutic Exercise    Long Term Goals     PT Goal 1  Goal Identifier: ROM  Goal Description: Patient will demonstrate >100 degrees shoulder abduction with pain less than 2/10  Rationale: to maximize safety and independence with performance of ADLs and functional tasks;to maximize safety and  independence within the home;to maximize safety and independence with self cares  Goal Progress: Initiated  Target Date: 07/05/25  PT Goal 2  Goal Identifier: Strength  Goal Description: Patient will demonstrate 5/5 shoulder strength in all planes  Rationale: to maximize safety and independence with performance of ADLs and functional tasks;to maximize safety and independence with self cares;to maximize safety and independence within the home  Goal Progress: Initiated  Target Date: 07/05/25      Frequency of Treatment: 1x/week for 6 weeks, every other week for an additional 6 weeks  Duration of Treatment: 12 weeks    Recommended Referrals to Other Professionals:   Education Assessment:   Learner/Method: Patient    Risks and benefits of evaluation/treatment have been explained.   Patient/Family/caregiver agrees with Plan of Care.     Evaluation Time:     PT Eval, Low Complexity Minutes (42783): 15       Signing Clinician: Isha Salcedo PT        [facility]                                                                                   OUTPATIENT PHYSICAL THERAPY      PLAN OF TREATMENT FOR OUTPATIENT REHABILITATION   Patient's Last Name, First Name, Margie Camacho YOB: 1970   Provider's Name   [facility]   Medical Record No.  [id number]     Onset Date: 05/01/25 (referral date)  Start of Care Date: 05/06/25     Medical Diagnosis:         PT Treatment Diagnosis:    Plan of Treatment  Frequency/Duration: 1x/week for 6 weeks, every other week for an additional 6 weeks/ 12 weeks    Certification date from 05/06/25 to 08/03/25         See note for plan of treatment details and functional goals     Isha Salcedo, PT                         I CERTIFY THE NEED FOR THESE SERVICES FURNISHED UNDER        THIS PLAN OF TREATMENT AND WHILE UNDER MY CARE     (Physician attestation of this document indicates review and certification of the  therapy plan).              Referring Provider:  Carleen Snowden    Initial Assessment  See Epic Evaluation- Start of Care Date: 05/06/25

## 2025-05-05 NOTE — PROGRESS NOTES
[facility] Call / Virtual Visit 3 Note    Study Description: The purpose of this study is to explore potential relationships between physiologic parameters collected from sensor data with physiological changes potentially induced by the administration of the COVID-19 vaccine.       Subject ID:      Was the visit performed? Yes  Location:     Virtual Visit 3: 05-MAY-2025  Time of Visit (24H): 10:05      Reminders  [x] Check compliance and address any issues   -If there are pending Igloo uploads, bring gateway close to the phone  [x] Are you having any issues with your study devices? No   [x] Continue to follow nightly/daily study procedures  [x] Did you have any significant side effects from the Covid vaccination? No  [x] Are all of your pills red in the igloo yuriy? Yes:    - If Yes, Please wear your gateway for the next 7 nights. You can remove the No MRI band today.    -If No, continue to wear your gateway and No MRI band as directed.     Additional Notes:  N/A    CBT Pill Ingestion Log     Only 3 pills were ingested due to the prevailing 3 pill plan as outlined in section 6.2  (pg. 73) of the MOP.    Adverse Events Summary:*   Were any Adverse Events (AEs) experienced?  No    Protocol Deviation Summary:*   Were there any Protocol Deviations?:  No    *If Yes, please complete corresponding form.    Concomitant Medications Summary:    Has the subject taken any medications within 30 days prior to signing the informed consent and/or during the study? (Have they started any new medications since their last appointment?) Yes, and their medications have not changed since their last visit. Please refer to that documentation for the medication list.       05-MAY-2025   Suzanne Amato

## 2025-05-06 ENCOUNTER — THERAPY VISIT (OUTPATIENT)
Dept: PHYSICAL THERAPY | Facility: CLINIC | Age: 55
End: 2025-05-06
Attending: NURSE PRACTITIONER
Payer: COMMERCIAL

## 2025-05-06 ENCOUNTER — PATIENT OUTREACH (OUTPATIENT)
Dept: CARE COORDINATION | Facility: CLINIC | Age: 55
End: 2025-05-06

## 2025-05-06 DIAGNOSIS — M25.511 RIGHT SHOULDER PAIN, UNSPECIFIED CHRONICITY: ICD-10-CM

## 2025-05-06 PROCEDURE — 97161 PT EVAL LOW COMPLEX 20 MIN: CPT | Mod: GP

## 2025-05-06 PROCEDURE — 97110 THERAPEUTIC EXERCISES: CPT | Mod: GP

## 2025-05-06 ASSESSMENT — ACTIVITIES OF DAILY LIVING (ADL)
REMOVING_SOMETHING_FROM_YOUR_BACK_POCKET: 2
AT_ITS_WORST?: 8
PUTTING_ON_AN_UNDERSHIRT_OR_A_PULLOVER_SWEATER: 8
REACHING_FOR_SOMETHING_ON_A_HIGH_SHELF: 8
CARRYING_A_HEAVY_OBJECT_OF_10_POUNDS: 7
PUTTING_ON_YOUR_PANTS: 1
PLEASE_INDICATE_YOR_PRIMARY_REASON_FOR_REFERRAL_TO_THERAPY:: SHOULDER
PLACING_AN_OBJECT_ON_A_HIGH_SHELF: 8
WASHING_YOUR_BACK: 3
PUTTING_ON_A_SHIRT_THAT_BUTTONS_DOWN_THE_FRONT: 1
WASHING_YOUR_HAIR?: 3
TOUCHING_THE_BACK_OF_YOUR_NECK: 1
PUSHING_WITH_THE_INVOLVED_ARM: 8

## 2025-05-07 ENCOUNTER — RESULTS FOLLOW-UP (OUTPATIENT)
Dept: FAMILY MEDICINE | Facility: CLINIC | Age: 55
End: 2025-05-07

## 2025-05-22 ENCOUNTER — VIRTUAL VISIT (OUTPATIENT)
Dept: RESEARCH | Facility: CLINIC | Age: 55
End: 2025-05-22
Payer: COMMERCIAL

## 2025-05-22 ENCOUNTER — PATIENT OUTREACH (OUTPATIENT)
Dept: CARE COORDINATION | Facility: CLINIC | Age: 55
End: 2025-05-22

## 2025-05-22 ENCOUNTER — THERAPY VISIT (OUTPATIENT)
Dept: PHYSICAL THERAPY | Facility: CLINIC | Age: 55
End: 2025-05-22
Attending: NURSE PRACTITIONER
Payer: COMMERCIAL

## 2025-05-22 DIAGNOSIS — Z00.6 RESEARCH SUBJECT: Primary | ICD-10-CM

## 2025-05-22 DIAGNOSIS — M25.511 RIGHT SHOULDER PAIN, UNSPECIFIED CHRONICITY: Primary | ICD-10-CM

## 2025-05-22 NOTE — PROGRESS NOTES
[facility] Call / Virtual Visit 4 Note    Study Description: The purpose of this study is to explore potential relationships between physiologic parameters collected from sensor data with physiological changes potentially induced by the administration of the COVID-19 vaccine.       Subject ID:      Was the visit performed? Yes    Reminders  [x] Check compliance and address any issues   -If there are pending Igloo uploads, bring gateway close to the phone  [x] Are you having any issues with your study devices? No   [x] New Procedures   -For the next 4 days only do the morning survey and leave devices on the     -Basically do whatever surveys present themselves in the HSA yuriy     -They need to do the evening survey and wear the watch on night 45   -On page 12 of the Subject Instruction Packet  [x] If you have not removed your No MRI bracelet by now, please do so  [x] Confirm end of study visit      Additional Notes:  N/A    For [facility] Unscheduled Visit for this Call:   Subject was instructed on the procedures for the last 4 days of study participation. Device issues and compliance were reviewed and addressed. The end of study appointment date and time were confirmed. Subject was reminded to bring their study devices with them.     Adverse Events Summary:*   Were any Adverse Events (AEs) experienced?  No    Protocol Deviation Summary:*   Were there any Protocol Deviations?:  No    *If Yes, please complete corresponding form.    Concomitant Medications Summary:    Has the subject taken any medications within 30 days prior to signing the informed consent and/or during the study? (Have they started any new medications since their last appointment?) Yes, and their medications have not changed since their last visit. Please refer to that documentation for the medication list.       22-MAY-2025   Suzanne Amato

## 2025-05-28 ENCOUNTER — ALLIED HEALTH/NURSE VISIT (OUTPATIENT)
Dept: RESEARCH | Facility: CLINIC | Age: 55
End: 2025-05-28
Payer: COMMERCIAL

## 2025-05-28 DIAGNOSIS — Z00.6 RESEARCH SUBJECT: Primary | ICD-10-CM

## 2025-05-28 PROCEDURE — 99207 PR NO CHARGE-RESEARCH SERVICE: CPT

## 2025-06-05 ENCOUNTER — THERAPY VISIT (OUTPATIENT)
Dept: PHYSICAL THERAPY | Facility: CLINIC | Age: 55
End: 2025-06-05
Payer: COMMERCIAL

## 2025-06-05 DIAGNOSIS — M25.511 RIGHT SHOULDER PAIN, UNSPECIFIED CHRONICITY: Primary | ICD-10-CM

## 2025-06-19 ENCOUNTER — THERAPY VISIT (OUTPATIENT)
Dept: PHYSICAL THERAPY | Facility: CLINIC | Age: 55
End: 2025-06-19
Payer: COMMERCIAL

## 2025-06-19 DIAGNOSIS — M25.511 RIGHT SHOULDER PAIN, UNSPECIFIED CHRONICITY: Primary | ICD-10-CM

## 2025-07-10 ENCOUNTER — THERAPY VISIT (OUTPATIENT)
Dept: PHYSICAL THERAPY | Facility: CLINIC | Age: 55
End: 2025-07-10
Payer: COMMERCIAL

## 2025-07-10 DIAGNOSIS — M25.511 RIGHT SHOULDER PAIN, UNSPECIFIED CHRONICITY: Primary | ICD-10-CM

## 2025-07-21 ENCOUNTER — TELEPHONE (OUTPATIENT)
Dept: FAMILY MEDICINE | Facility: CLINIC | Age: 55
End: 2025-07-21
Payer: COMMERCIAL

## 2025-07-21 NOTE — TELEPHONE ENCOUNTER
Patient Quality Outreach    Patient is due for the following:   Colon Cancer Screening    Action(s) Taken:   Schedule a office visit for Colonoscopy ordered 05/01/2025    Type of outreach:    Sent HDF message.    Questions for provider review:    None         Ledy Yadav CMA  Chart routed to None.

## 2025-07-23 ENCOUNTER — OFFICE VISIT (OUTPATIENT)
Dept: RHEUMATOLOGY | Facility: CLINIC | Age: 55
End: 2025-07-23
Payer: COMMERCIAL

## 2025-07-23 ENCOUNTER — ANCILLARY PROCEDURE (OUTPATIENT)
Dept: GENERAL RADIOLOGY | Facility: CLINIC | Age: 55
End: 2025-07-23
Attending: STUDENT IN AN ORGANIZED HEALTH CARE EDUCATION/TRAINING PROGRAM
Payer: COMMERCIAL

## 2025-07-23 VITALS
OXYGEN SATURATION: 99 % | HEART RATE: 85 BPM | DIASTOLIC BLOOD PRESSURE: 96 MMHG | WEIGHT: 211 LBS | SYSTOLIC BLOOD PRESSURE: 150 MMHG | BODY MASS INDEX: 35.11 KG/M2

## 2025-07-23 DIAGNOSIS — M06.09 SERONEGATIVE RHEUMATOID ARTHRITIS OF MULTIPLE SITES (H): ICD-10-CM

## 2025-07-23 DIAGNOSIS — M06.09 SERONEGATIVE RHEUMATOID ARTHRITIS OF MULTIPLE SITES (H): Primary | ICD-10-CM

## 2025-07-23 DIAGNOSIS — Z79.899 LONG-TERM USE OF HYDROXYCHLOROQUINE: ICD-10-CM

## 2025-07-23 LAB
CRP SERPL-MCNC: 10.7 MG/L
ERYTHROCYTE [SEDIMENTATION RATE] IN BLOOD BY WESTERGREN METHOD: 3 MM/HR (ref 0–30)

## 2025-07-23 PROCEDURE — 73130 X-RAY EXAM OF HAND: CPT | Mod: LT | Performed by: RADIOLOGY

## 2025-07-23 RX ORDER — HYDROXYCHLOROQUINE SULFATE 200 MG/1
400 TABLET, FILM COATED ORAL DAILY
Qty: 180 TABLET | Refills: 1 | Status: SHIPPED | OUTPATIENT
Start: 2025-07-23

## 2025-07-23 RX ORDER — PREDNISONE 5 MG/1
5 TABLET ORAL DAILY
Qty: 30 TABLET | Refills: 0 | Status: SHIPPED | OUTPATIENT
Start: 2025-07-23

## 2025-07-23 ASSESSMENT — PAIN SCALES - GENERAL: PAINLEVEL_OUTOF10: MODERATE PAIN (5)

## 2025-07-23 NOTE — PROGRESS NOTES
Rheumatology Outpatient Consult Note    DATE OF SERVICE: 2025    REQUESTING PHYSICIAN:  Carleen Snowden      CHIEF COMPLAINT:  Chief Complaint   Patient presents with    Consult      Pain in joint, multiple sites    New Patient     Referred by:  Carleen Snowden APRN CNP       Margie is a 55 year old patient who presents today to the  Rheumatology Clinic at the request of Carleen Snowden  for consultation of joint pain    Subjective     HISTORY OF PRESENT ILLNESS     has a past medical history of Allergic rhinitis, cause unspecified, Anemia, Depressive disorder, not elsewhere classified, Esophageal reflux, Fear of travel with panic attacks (2010), previous reproductive problem, Hypertension (), Migraines, Mixed hyperlipidemia (2025), Personal history of urinary calculi, and PONV (postoperative nausea and vomiting).    She has no past medical history of Arthritis, Cancer (H), Cerebral infarction (H), Congestive heart failure (H), COPD (chronic obstructive pulmonary disease) (H), Diabetes (H), Heart disease, History of blood transfusion, Thyroid disease, or Uncomplicated asthma.   has a past surgical history that includes Myomectomy uterus (2010); Rhinoplasty; Dilation and curettage, operative hysteroscopy, combined (2011);  section (2013); hernia repair (); and Cataract Extraction (Right, 2022).      Patient reports here due to new joint pain that started ~Feb and increased to constant pain and stiffness in 2025. She states in AM she wakes up with very stiff hands, has to stretch for 10 minutes then when she gets out of bed her knees, ankles, toes feel stiff as well. Denies spine stiffness. She notes also waking up around 2AM at night due to pain/stiffness. Her pain also occurs with prolonged sitting. She notes at times her toes look swollen. She also had some ROM limitation of her R shoulder but this is being worked on with PT and feels like a separate  pain. She takes tylenol daily as well as 2 tab NSAID in AM and in PM for the past 3 months.     Mother had RA, she passed away in September 2024, also lost her job around that time and understandably stressful time for her, shortly after this is when her joint pain had started.       Labs:   Negative RF, CCP, NIRMAL, ESR WNL    CRP elevated     I have reviewed medical records and performed history and exam.    ASSESSMENT:  1. Seronegative rheumatoid arthritis of multiple sites (H)    2. Long-term use of hydroxychloroquine        PLAN:    (M06.09) Seronegative rheumatoid arthritis of multiple sites (H)  (primary encounter diagnosis)  Comment: New diagnosis of seronegative RA -- mild synovitis of MCPs of hands noted   Plan: hydroxychloroquine (PLAQUENIL) 200 MG tablet,         predniSONE (DELTASONE) 5 MG tablet, Erythrocyte        sedimentation rate auto, CRP inflammation, XR         Hand Bilateral G/E 3 Views, LYME DISEASE TOTAL         ANTIBODIES WITH REFLEX TO CONFIRMATION  -New start HCQ 400mg daily   -Prednisone 5mg daily x30 days-- stop NSAID use during this time. Ok to use tylenol.   Discussed AE of prednisone including GERD, insomnia, weight gain, insulin resistance.   -X-ray b/l hands   -Baseline labs, r/o Lyme    (Z79.899) Long-term use of hydroxychloroquine  Comment:   Plan:   -Long term therapy requires annual eye exams for screening       Follow up in 4mo    Thank you very much for allowing me to participate in the care of  Margie Please call with any questions.      Carol Baxter MD  [facility]     PAST MEDICAL HISTORY  Past Medical History:   Diagnosis Date    Allergic rhinitis, cause unspecified     Anemia     Depressive disorder, not elsewhere classified     Esophageal reflux     Fear of travel with panic attacks 04/30/2010    Hx of previous reproductive problem     ivf pregnancy    Hypertension 2012    Migraines     Mixed hyperlipidemia 05/02/2025    Personal history of  urinary calculi     PONV (postoperative nausea and vomiting)     reviewed  Past Surgical History:   Procedure Laterality Date    CATARACT EXTRACTION Right 2022     SECTION  2013    Procedure:  SECTION;   Section;  Surgeon: Lola Fuentes MD;  Location: [facility] L[facility]D    DILATION AND CURETTAGE, OPERATIVE HYSTEROSCOPY, COMBINED  2011    Procedure:COMBINED DILATION AND CURETTAGE, OPERATIVE HYSTEROSCOPY; Removal Endometrial Polyp; Surgeon:LOLA FUENTES; Location:[facility] OR    HERNIA REPAIR  1975    Inguinal    MYOMECTOMY UTERUS  2010    RHINOPLASTY      reviewed    MEDICATIONS  Current Outpatient Medications   Medication Sig Dispense Refill    CENTRUM OR TABS 1 TABLET DAILY      cetirizine (ZYRTEC) 10 MG tablet Take 10 mg by mouth daily.      hydroxychloroquine (PLAQUENIL) 200 MG tablet Take 2 tablets (400 mg) by mouth daily. Annual Plaquenil toxicity eye screening required. 180 tablet 1    hydrOXYzine jose luis (VISTARIL) 25 MG capsule Take 1 capsule (25 mg) by mouth 3 times daily as needed for itching 20 capsule 0    losartan (COZAAR) 100 MG tablet Take 1 tablet (100 mg) by mouth daily. 90 tablet 3    ondansetron (ZOFRAN ODT) 4 MG ODT tab TAKE 1 TABLET BY MOUTH EVERY 6 HOURS AS NEEDED FOR NAUSEA 18 tablet 1    predniSONE (DELTASONE) 5 MG tablet Take 1 tablet (5 mg) by mouth daily. Take in AM with meals. Avoid with NSAIDs 30 tablet 0    sodium chloride (OCEAN) 0.65 % nasal spray Sig: use the saline nasal wash twice a day to keep the passages moist (Patient not taking: Reported on 2025) 30 mL 0       ALLERGIES  Allergies   Allergen Reactions    Iodine Hives     xray dye    Penicillins Hives    Dust Mites     Hydrochlorothiazide Rash    Lexapro [Escitalopram Oxalate] Rash    Ragweeds     Sulfa Antibiotics Rash       SOCIAL HISTORY  Social History     Socioeconomic History    Marital status:      Spouse name: Not on file    Number of children: Not on file    Years  of education: Not on file    Highest education level: Not on file   Occupational History    Not on file   Tobacco Use    Smoking status: Never     Passive exposure: Never    Smokeless tobacco: Never   Vaping Use    Vaping status: Never Used   Substance and Sexual Activity    Alcohol use: Yes     Comment: 1-2 on weekends    Drug use: No    Sexual activity: Yes     Partners: Male     Birth control/protection: None   Other Topics Concern    Parent/sibling w/ CABG, MI or angioplasty before 65F 55M? No   Social History Narrative    Caffeine intake/servings daily - 1per week    Calcium intake/servings daily - 3    Exercise 3 times weekly - describe cardio, walking    Sunscreen used - Yes    Seatbelts used - Yes    Guns stored in the home - No    Self Breast Exam - Yes    Pap test up to date -  Yes    Eye exam up to date -  Yes    Dental exam up to date -  No    DEXA scan up to date -  Not Applicable    Flex Sig/Colonoscopy up to date -  Not Applicable    Mammography up to date -  Not Applicable    Immunizations reviewed and up to date - Yes    Abuse: Current or Past (Physical, Sexual or Emotional) - No    Do you feel safe in your environment - Yes    Do you cope well with stress - Yes    Do you suffer from insomnia - No    Last updated by: Ana Laura Chan  5/5/2009     Social Drivers of Health     Financial Resource Strain: Low Risk  (4/30/2025)    Financial Resource Strain     Within the past 12 months, have you or your family members you live with been unable to get utilities (heat, electricity) when it was really needed?: No   Food Insecurity: Low Risk  (4/30/2025)    Food Insecurity     Within the past 12 months, did you worry that your food would run out before you got money to buy more?: No     Within the past 12 months, did the food you bought just not last and you didn t have money to get more?: No   Transportation Needs: Low Risk  (4/30/2025)    Transportation Needs     Within the past 12 months, has lack of  transportation kept you from medical appointments, getting your medicines, non-medical meetings or appointments, work, or from getting things that you need?: No   Physical Activity: Insufficiently Active (4/30/2025)    Exercise Vital Sign     Days of Exercise per Week: 2 days     Minutes of Exercise per Session: 20 min   Stress: No Stress Concern Present (4/30/2025)    [place] [employer] - Occupational Stress Questionnaire     Feeling of Stress : Not at all   Social Connections: Unknown (4/30/2025)    Social Connection and Isolation Panel [NHANES]     Frequency of Communication with Friends and Family: Not on file     Frequency of Social Gatherings with Friends and Family: Three times a week     Attends Confucianism Services: Not on file     Active Member of Clubs or Organizations: Not on file     Attends Club or Organization Meetings: Not on file     Marital Status: Not on file   Interpersonal Safety: Low Risk  (5/1/2025)    Interpersonal Safety     Do you feel physically and emotionally safe where you currently live?: Yes     Within the past 12 months, have you been hit, slapped, kicked or otherwise physically hurt by someone?: No     Within the past 12 months, have you been humiliated or emotionally abused in other ways by your partner or ex-partner?: No   Housing Stability: Low Risk  (4/30/2025)    Housing Stability     Do you have housing? : Yes     Are you worried about losing your housing?: No    reviewed    FAMILY HISTORY  Family History   Problem Relation Age of Onset    Rheumatoid Arthritis Mother     Thyroid Disease Mother         hypothyroidism    Hypertension Mother     Breast Cancer Mother     Macular Degeneration Mother     Depression Father     Alcohol/Drug Father     Neurologic Disorder Father         seizures    Lipids Father     Cataracts Father     Depression Sister     Depression Sister     Diabetes Maternal Grandmother     Heart Disease Maternal Grandfather     Heart Disease  Paternal Grandmother     Genitourinary Problems Paternal Grandfather    : reviewed .         Objective   PHYSICAL EXAMINATION  BP (!) 150/96   Pulse 85   Wt 95.7 kg (211 lb)   SpO2 99%   BMI 35.11 kg/m  : reviewed    Physical Exam    GENERAL: Pleasant and cooperative, in no distress. Alert and Oriented x 3.  SKIN: no psoriasis. No Raynaud's.    MUSCULOSKELETAL:  Shoulders: R slight restricted ROM.  Elbows:  No extension deficits or contractures. No tophi or rheumatoid  nodules.  Wrists:  No Tenosynovitis dorsum of wrist. Full ROM.  Hands:  +b/l 2nd and 3rd mild Synovitis noted MCP. Good .  Knees: No crepitus.  No swelling or effusion, or bulge sign.  Ankles:  No swelling. Normal achilles tendon. No pain over plantar fascia.  Feet: +b/l pain on metatarsal .      LABS: Past labs reviewed and discussed with the patient.        Latest Ref Rng & Units 9/13/2022     3:31 PM 5/1/2025     1:45 PM 7/23/2025     4:06 PM   RHEUM RESULTS   Albumin 3.5 - 5.2 g/dL  4.3     ALT 0 - 50 U/L  16     AST 0 - 45 U/L  21     NIRMAL interpretation Negative  Negative     Creatinine 0.51 - 0.95 mg/dL 0.95  0.92     CRP Inflammation <5.00 mg/L  8.95  10.70    GFR Estimate >60 mL/min/1.73m2 72  74     Hematocrit 35.0 - 47.0 %  42.6     Hemoglobin 11.7 - 15.7 g/dL  14.2     Hepatitis C Antibody Nonreactive Nonreactive      WBC 4.0 - 11.0 10e3/uL  6.4     RBC Count 3.80 - 5.20 10e6/uL  4.77     RDW 10.0 - 15.0 %  13.1     MCHC 31.5 - 36.5 g/dL  33.3     MCV 78 - 100 fL  89     Platelet Count 150 - 450 10e3/uL  240     Sed Rate 0 - 30 mm/hr  16  3          IMAGING:  Reviewed    Carol Baxter MD  [facility]

## 2025-07-24 PROBLEM — M06.09 SERONEGATIVE RHEUMATOID ARTHRITIS OF MULTIPLE SITES (H): Status: ACTIVE | Noted: 2025-07-23

## 2025-07-24 LAB — B BURGDOR IGG+IGM SER QL: 0.32

## 2025-08-17 ENCOUNTER — HEALTH MAINTENANCE LETTER (OUTPATIENT)
Age: 55
End: 2025-08-17

## 2025-08-20 DIAGNOSIS — M06.09 SERONEGATIVE RHEUMATOID ARTHRITIS OF MULTIPLE SITES (H): ICD-10-CM

## 2025-08-21 RX ORDER — PREDNISONE 5 MG/1
5 TABLET ORAL DAILY
Qty: 15 TABLET | Refills: 0 | Status: SHIPPED | OUTPATIENT
Start: 2025-08-21